# Patient Record
Sex: FEMALE | Race: WHITE | NOT HISPANIC OR LATINO
[De-identification: names, ages, dates, MRNs, and addresses within clinical notes are randomized per-mention and may not be internally consistent; named-entity substitution may affect disease eponyms.]

---

## 2017-05-15 PROBLEM — Z00.00 ENCOUNTER FOR PREVENTIVE HEALTH EXAMINATION: Status: ACTIVE | Noted: 2017-05-15

## 2017-06-28 ENCOUNTER — APPOINTMENT (OUTPATIENT)
Dept: PULMONOLOGY | Facility: CLINIC | Age: 77
End: 2017-06-28

## 2017-06-28 VITALS
DIASTOLIC BLOOD PRESSURE: 60 MMHG | OXYGEN SATURATION: 94 % | BODY MASS INDEX: 47.92 KG/M2 | HEART RATE: 91 BPM | SYSTOLIC BLOOD PRESSURE: 108 MMHG | HEIGHT: 56 IN | WEIGHT: 213 LBS

## 2017-06-28 DIAGNOSIS — Z86.39 PERSONAL HISTORY OF OTHER ENDOCRINE, NUTRITIONAL AND METABOLIC DISEASE: ICD-10-CM

## 2017-06-28 DIAGNOSIS — Z87.891 PERSONAL HISTORY OF NICOTINE DEPENDENCE: ICD-10-CM

## 2017-06-28 DIAGNOSIS — Z86.79 PERSONAL HISTORY OF OTHER DISEASES OF THE CIRCULATORY SYSTEM: ICD-10-CM

## 2017-06-28 DIAGNOSIS — Z87.19 PERSONAL HISTORY OF OTHER DISEASES OF THE DIGESTIVE SYSTEM: ICD-10-CM

## 2017-06-28 DIAGNOSIS — J31.0 CHRONIC RHINITIS: ICD-10-CM

## 2017-06-28 RX ORDER — PREDNISONE 10 MG/1
10 TABLET ORAL
Qty: 40 | Refills: 0 | Status: DISCONTINUED | COMMUNITY
Start: 2017-05-01

## 2017-06-28 RX ORDER — METOPROLOL SUCCINATE 200 MG/1
200 TABLET, EXTENDED RELEASE ORAL
Qty: 90 | Refills: 0 | Status: ACTIVE | COMMUNITY
Start: 2016-09-19

## 2017-06-28 RX ORDER — ALBUTEROL SULFATE 90 UG/1
108 (90 BASE) AEROSOL, METERED RESPIRATORY (INHALATION)
Qty: 18 | Refills: 0 | Status: ACTIVE | COMMUNITY
Start: 2017-04-25

## 2017-06-28 RX ORDER — ATORVASTATIN CALCIUM 40 MG/1
40 TABLET, FILM COATED ORAL
Qty: 90 | Refills: 0 | Status: ACTIVE | COMMUNITY
Start: 2016-09-19

## 2017-06-28 RX ORDER — FENOFIBRATE 160 MG/1
160 TABLET ORAL
Qty: 90 | Refills: 0 | Status: ACTIVE | COMMUNITY
Start: 2016-09-19

## 2017-06-28 RX ORDER — AZITHROMYCIN 250 MG/1
250 TABLET, FILM COATED ORAL
Qty: 6 | Refills: 0 | Status: DISCONTINUED | COMMUNITY
Start: 2017-04-25

## 2017-06-28 RX ORDER — CILOSTAZOL 100 MG/1
100 TABLET ORAL
Qty: 180 | Refills: 0 | Status: DISCONTINUED | COMMUNITY
Start: 2016-08-30

## 2017-06-28 RX ORDER — AMOXICILLIN 875 MG/1
875 TABLET, FILM COATED ORAL
Qty: 20 | Refills: 0 | Status: DISCONTINUED | COMMUNITY
Start: 2017-03-30

## 2017-06-28 RX ORDER — GLIPIZIDE 10 MG/1
10 TABLET, EXTENDED RELEASE ORAL
Qty: 180 | Refills: 0 | Status: ACTIVE | COMMUNITY
Start: 2016-12-27

## 2017-06-28 RX ORDER — RAMIPRIL 10 MG/1
10 CAPSULE ORAL
Qty: 90 | Refills: 0 | Status: ACTIVE | COMMUNITY
Start: 2016-09-19

## 2017-06-28 RX ORDER — LEVOTHYROXINE SODIUM 0.07 MG/1
75 TABLET ORAL
Qty: 90 | Refills: 0 | Status: ACTIVE | COMMUNITY
Start: 2016-12-27

## 2017-06-28 RX ORDER — ALBUTEROL SULFATE 0.63 MG/3ML
0.63 SOLUTION RESPIRATORY (INHALATION)
Qty: 360 | Refills: 0 | Status: ACTIVE | COMMUNITY
Start: 2017-05-01

## 2017-06-28 RX ORDER — AMOXICILLIN AND CLAVULANATE POTASSIUM 875; 125 MG/1; MG/1
875-125 TABLET, COATED ORAL
Qty: 20 | Refills: 0 | Status: DISCONTINUED | COMMUNITY
Start: 2017-05-01

## 2017-06-28 RX ORDER — FLUTICASONE PROPIONATE 50 UG/1
50 SPRAY, METERED NASAL
Qty: 16 | Refills: 0 | Status: ACTIVE | COMMUNITY
Start: 2017-03-30

## 2017-07-05 ENCOUNTER — FORM ENCOUNTER (OUTPATIENT)
Age: 77
End: 2017-07-05

## 2017-07-06 ENCOUNTER — OUTPATIENT (OUTPATIENT)
Dept: OUTPATIENT SERVICES | Facility: HOSPITAL | Age: 77
LOS: 1 days | End: 2017-07-06
Payer: MEDICARE

## 2017-07-06 DIAGNOSIS — I73.9 PERIPHERAL VASCULAR DISEASE, UNSPECIFIED: ICD-10-CM

## 2017-07-06 DIAGNOSIS — R09.02 HYPOXEMIA: ICD-10-CM

## 2017-07-06 PROCEDURE — 71010: CPT | Mod: 26

## 2017-07-06 PROCEDURE — A9567: CPT

## 2017-07-06 PROCEDURE — 71045 X-RAY EXAM CHEST 1 VIEW: CPT

## 2017-07-06 PROCEDURE — 78582 LUNG VENTILAT&PERFUS IMAGING: CPT | Mod: 26

## 2017-07-06 PROCEDURE — 93970 EXTREMITY STUDY: CPT

## 2017-07-06 PROCEDURE — 93970 EXTREMITY STUDY: CPT | Mod: 26

## 2017-07-06 PROCEDURE — 78582 LUNG VENTILAT&PERFUS IMAGING: CPT

## 2017-07-06 PROCEDURE — A9540: CPT

## 2017-07-11 ENCOUNTER — APPOINTMENT (OUTPATIENT)
Dept: PULMONOLOGY | Facility: CLINIC | Age: 77
End: 2017-07-11

## 2017-07-11 VITALS
HEIGHT: 56 IN | SYSTOLIC BLOOD PRESSURE: 124 MMHG | HEART RATE: 84 BPM | BODY MASS INDEX: 47.24 KG/M2 | WEIGHT: 210 LBS | DIASTOLIC BLOOD PRESSURE: 76 MMHG | OXYGEN SATURATION: 90 %

## 2017-07-11 VITALS — OXYGEN SATURATION: 94 %

## 2017-07-11 RX ORDER — METFORMIN ER 500 MG 500 MG/1
500 TABLET ORAL
Qty: 180 | Refills: 0 | Status: DISCONTINUED | COMMUNITY
Start: 2016-12-27 | End: 2017-07-11

## 2017-07-11 RX ORDER — DULAGLUTIDE 0.75 MG/.5ML
0.75 INJECTION, SOLUTION SUBCUTANEOUS
Qty: 2 | Refills: 0 | Status: ACTIVE | COMMUNITY
Start: 2017-06-28

## 2017-10-18 ENCOUNTER — APPOINTMENT (OUTPATIENT)
Dept: PULMONOLOGY | Facility: CLINIC | Age: 77
End: 2017-10-18
Payer: MEDICARE

## 2017-10-18 VITALS — DIASTOLIC BLOOD PRESSURE: 66 MMHG | SYSTOLIC BLOOD PRESSURE: 118 MMHG

## 2017-10-18 VITALS — OXYGEN SATURATION: 95 %

## 2017-10-18 PROCEDURE — 99214 OFFICE O/P EST MOD 30 MIN: CPT

## 2017-10-18 RX ORDER — BENZONATATE 100 MG/1
100 CAPSULE ORAL
Qty: 30 | Refills: 0 | Status: DISCONTINUED | COMMUNITY
Start: 2017-04-28 | End: 2017-10-18

## 2018-04-06 ENCOUNTER — MESSAGE (OUTPATIENT)
Age: 78
End: 2018-04-06

## 2018-04-10 ENCOUNTER — APPOINTMENT (OUTPATIENT)
Dept: THORACIC SURGERY | Facility: CLINIC | Age: 78
End: 2018-04-10
Payer: MEDICARE

## 2018-04-10 VITALS
BODY MASS INDEX: 43.21 KG/M2 | DIASTOLIC BLOOD PRESSURE: 74 MMHG | HEART RATE: 88 BPM | OXYGEN SATURATION: 93 % | TEMPERATURE: 98.2 F | HEIGHT: 58.27 IN | SYSTOLIC BLOOD PRESSURE: 171 MMHG | WEIGHT: 208.65 LBS

## 2018-04-10 PROCEDURE — 99204 OFFICE O/P NEW MOD 45 MIN: CPT

## 2018-04-18 ENCOUNTER — APPOINTMENT (OUTPATIENT)
Dept: PULMONOLOGY | Facility: CLINIC | Age: 78
End: 2018-04-18
Payer: MEDICARE

## 2018-04-18 VITALS — OXYGEN SATURATION: 92 % | HEART RATE: 89 BPM

## 2018-04-18 VITALS — BODY MASS INDEX: 42.66 KG/M2 | SYSTOLIC BLOOD PRESSURE: 150 MMHG | WEIGHT: 206 LBS | DIASTOLIC BLOOD PRESSURE: 80 MMHG

## 2018-04-18 VITALS — OXYGEN SATURATION: 94 %

## 2018-04-18 PROCEDURE — 99214 OFFICE O/P EST MOD 30 MIN: CPT

## 2018-07-20 ENCOUNTER — APPOINTMENT (OUTPATIENT)
Dept: PULMONOLOGY | Facility: CLINIC | Age: 78
End: 2018-07-20

## 2018-08-06 ENCOUNTER — APPOINTMENT (OUTPATIENT)
Dept: PULMONOLOGY | Facility: CLINIC | Age: 78
End: 2018-08-06
Payer: MEDICARE

## 2018-08-06 VITALS — OXYGEN SATURATION: 96 %

## 2018-08-06 VITALS
HEIGHT: 58 IN | SYSTOLIC BLOOD PRESSURE: 142 MMHG | DIASTOLIC BLOOD PRESSURE: 70 MMHG | HEART RATE: 88 BPM | WEIGHT: 192 LBS | OXYGEN SATURATION: 90 % | BODY MASS INDEX: 40.3 KG/M2

## 2018-08-06 PROCEDURE — 99214 OFFICE O/P EST MOD 30 MIN: CPT

## 2018-08-06 RX ORDER — FLUTICASONE FUROATE AND VILANTEROL TRIFENATATE 100; 25 UG/1; UG/1
100-25 POWDER RESPIRATORY (INHALATION)
Qty: 60 | Refills: 0 | Status: DISCONTINUED | COMMUNITY
Start: 2017-05-31 | End: 2018-08-06

## 2018-11-16 ENCOUNTER — APPOINTMENT (OUTPATIENT)
Dept: PULMONOLOGY | Facility: CLINIC | Age: 78
End: 2018-11-16
Payer: MEDICARE

## 2018-11-16 VITALS
OXYGEN SATURATION: 92 % | BODY MASS INDEX: 42.01 KG/M2 | HEART RATE: 53 BPM | DIASTOLIC BLOOD PRESSURE: 60 MMHG | WEIGHT: 201 LBS | SYSTOLIC BLOOD PRESSURE: 100 MMHG

## 2018-11-16 VITALS — OXYGEN SATURATION: 96 %

## 2018-11-16 PROCEDURE — 99214 OFFICE O/P EST MOD 30 MIN: CPT

## 2018-11-16 RX ORDER — INSULIN DEGLUDEC INJECTION 100 U/ML
100 INJECTION, SOLUTION SUBCUTANEOUS
Refills: 0 | Status: ACTIVE | COMMUNITY
Start: 2018-11-16

## 2019-02-06 ENCOUNTER — APPOINTMENT (OUTPATIENT)
Dept: PULMONOLOGY | Facility: CLINIC | Age: 79
End: 2019-02-06
Payer: MEDICARE

## 2019-02-06 VITALS — DIASTOLIC BLOOD PRESSURE: 72 MMHG | OXYGEN SATURATION: 92 % | SYSTOLIC BLOOD PRESSURE: 128 MMHG | HEART RATE: 50 BPM

## 2019-02-06 VITALS — OXYGEN SATURATION: 94 %

## 2019-02-06 DIAGNOSIS — Z03.89 ENCOUNTER FOR OBSERVATION FOR OTHER SUSPECTED DISEASES AND CONDITIONS RULED OUT: ICD-10-CM

## 2019-02-06 PROCEDURE — 99215 OFFICE O/P EST HI 40 MIN: CPT | Mod: 25

## 2019-02-06 PROCEDURE — 94010 BREATHING CAPACITY TEST: CPT

## 2019-02-06 NOTE — DISCUSSION/SUMMARY
[FreeTextEntry1] : CT scan 1/19 reviewed, resolved GG infiltrates, LLL nodule more dense, no size change\par no new pulmonary complaints\par spirometry without air flow obstruction, minimal change FVC from 2017\par borderline sp02 ,clinical  OHS/JOVON- refuses sleep study, \par previous v/q scan low probability, duplex negative, intermittently low sp02 presumed secondary to body habitus\par following with Cardiology and vascular for CAD, vascular stenosis, advised ER for chestc sspain this am or Cardiology today, she refuses, will go if recurs\par weight loss, vaccinations this fall \par 4 months , will contact sooner with repeat CT scan

## 2019-02-06 NOTE — PHYSICAL EXAM
[General Appearance - Well Developed] : well developed [Normal Appearance] : normal appearance [General Appearance - Well Nourished] : well nourished [No Deformities] : no deformities [Normal Conjunctiva] : the conjunctiva exhibited no abnormalities [Normal Oropharynx] : normal oropharynx [II] : II [Neck Appearance] : the appearance of the neck was normal [Heart Rate And Rhythm] : heart rate and rhythm were normal [Heart Sounds] : normal S1 and S2 [Murmurs] : no murmurs present [Edema] : no peripheral edema present [Respiration, Rhythm And Depth] : normal respiratory rhythm and effort [Exaggerated Use Of Accessory Muscles For Inspiration] : no accessory muscle use [Auscultation Breath Sounds / Voice Sounds] : lungs were clear to auscultation bilaterally [FreeTextEntry1] : no chest wall abn [Nail Clubbing] : no clubbing of the fingernails [Cyanosis, Localized] : no localized cyanosis [Petechial Hemorrhages (___cm)] : no petechial hemorrhages [No Focal Deficits] : no focal deficits [Oriented To Time, Place, And Person] : oriented to person, place, and time [Impaired Insight] : insight and judgment were intact [Affect] : the affect was normal [Memory Recent] : recent memory was not impaired [] : no rash

## 2019-02-06 NOTE — PROCEDURE
[FreeTextEntry1] : spirometry: mild mod air flow obstruction, minimal change from 2017\par CT 1/19 : resolved GG infiltrates, LLL nodule more dense, no change in size\par

## 2019-02-06 NOTE — CONSULT LETTER
[Dear  ___] : Dear  [unfilled], [Consult Letter:] : I had the pleasure of evaluating your patient, [unfilled]. [Please see my note below.] : Please see my note below. [Consult Closing:] : Thank you very much for allowing me to participate in the care of this patient.  If you have any questions, please do not hesitate to contact me. [Sincerely,] : Sincerely, [FreeTextEntry3] : Bobo Campo DO [facility]\par Pulmonary Critical Care\par Director Pulmonary Division\par Medical Director Respiratory Therapy\par [facility]\par \par  [DrMorenita  ___] : Dr. WINKLER [[facility], [facility]] : Bob[facility] Camp[facility] DO, [facility] [Director, Respiratory Care] : Director, Respiratory Care [[facility]] : [facility]

## 2019-02-06 NOTE — REASON FOR VISIT
[Follow-Up] : a follow-up visit [Abnormal CT Scan] : abnormal CT Scan [Cough] : cough [FreeTextEntry2] : lung nodule, adenopathy

## 2019-02-06 NOTE — HISTORY OF PRESENT ILLNESS
[FreeTextEntry1] : had chests pain this am with dyspnea, now better\par no fever, chills, or discolored sputum\par former 40 pack yr smoker quit 18 yrs ago\par saw Dr Valenzuela, will follow CT scan\par followed by Dr Paz, has CAD/stents, recent cath , now new stents had collaterals per pt

## 2019-03-13 ENCOUNTER — APPOINTMENT (OUTPATIENT)
Dept: DERMATOLOGY | Facility: CLINIC | Age: 79
End: 2019-03-13

## 2019-05-24 ENCOUNTER — APPOINTMENT (OUTPATIENT)
Dept: PULMONOLOGY | Facility: CLINIC | Age: 79
End: 2019-05-24
Payer: MEDICARE

## 2019-05-24 VITALS — OXYGEN SATURATION: 94 %

## 2019-05-24 VITALS — DIASTOLIC BLOOD PRESSURE: 70 MMHG | OXYGEN SATURATION: 92 % | SYSTOLIC BLOOD PRESSURE: 130 MMHG | HEART RATE: 75 BPM

## 2019-05-24 VITALS — WEIGHT: 205 LBS | BODY MASS INDEX: 42.85 KG/M2

## 2019-05-24 DIAGNOSIS — R59.0 LOCALIZED ENLARGED LYMPH NODES: ICD-10-CM

## 2019-05-24 PROCEDURE — 99215 OFFICE O/P EST HI 40 MIN: CPT

## 2019-05-24 RX ORDER — PREGABALIN 300 MG/1
CAPSULE ORAL
Refills: 0 | Status: ACTIVE | COMMUNITY

## 2019-05-24 NOTE — PHYSICAL EXAM
[Normal Appearance] : normal appearance [General Appearance - Well Nourished] : well nourished [General Appearance - Well Developed] : well developed [No Deformities] : no deformities [Normal Conjunctiva] : the conjunctiva exhibited no abnormalities [Normal Oropharynx] : normal oropharynx [Neck Appearance] : the appearance of the neck was normal [II] : II [Heart Rate And Rhythm] : heart rate and rhythm were normal [Edema] : no peripheral edema present [Heart Sounds] : normal S1 and S2 [Murmurs] : no murmurs present [Auscultation Breath Sounds / Voice Sounds] : lungs were clear to auscultation bilaterally [Respiration, Rhythm And Depth] : normal respiratory rhythm and effort [Exaggerated Use Of Accessory Muscles For Inspiration] : no accessory muscle use [Nail Clubbing] : no clubbing of the fingernails [Petechial Hemorrhages (___cm)] : no petechial hemorrhages [Cyanosis, Localized] : no localized cyanosis [No Focal Deficits] : no focal deficits [Impaired Insight] : insight and judgment were intact [Oriented To Time, Place, And Person] : oriented to person, place, and time [Memory Recent] : recent memory was not impaired [] : no rash [Affect] : the affect was normal [FreeTextEntry1] : L carotid scar

## 2019-05-24 NOTE — HISTORY OF PRESENT ILLNESS
[FreeTextEntry1] : dyspnea at baseline\par no fever, chills, or discolored sputum\par former 40 pack yr smoker quit 18 yrs ago\par saw Dr Valenzuela, will follow CT scan\par followed by Dr Paz, has CAD/stents, \par in wheelchair complaining of thigh pain, radiating from back

## 2019-05-24 NOTE — DISCUSSION/SUMMARY
[FreeTextEntry1] : COPD,anatomic emphysema, Obesity, stable mild ILD, lung nodules\par CT scan 5/19 reviewed, resolved GG infiltrates, LLL nodule stable, stable mild ILD, lingula atelectasis like density\par no new pulmonary complaints, refuse sleep study\par previous spirometry without air flow obstruction, minimal change FVC from 2017\par borderline sp02 ,clinical  OHS/JOVON- refuses sleep study, \par previous v/q scan low probability, duplex negative, intermittently low sp02 presumed secondary to body habitus\par following with Cardiology and vascular for CAD, vascular stenosis, advised ER for \par weight loss,\par 6 months , will contact sooner with repeat CT scan

## 2019-05-24 NOTE — REASON FOR VISIT
[Abnormal CT Scan] : abnormal CT Scan [Follow-Up] : a follow-up visit [Cough] : cough [FreeTextEntry2] : lung nodule, adenopathy

## 2019-05-24 NOTE — CONSULT LETTER
[Dear  ___] : Dear  [unfilled], [Consult Letter:] : I had the pleasure of evaluating your patient, [unfilled]. [Consult Closing:] : Thank you very much for allowing me to participate in the care of this patient.  If you have any questions, please do not hesitate to contact me. [Please see my note below.] : Please see my note below. [Sincerely,] : Sincerely, [DrMorenita  ___] : Dr. WINKLER [[facility] Camp[facility] DO, [facility]] : Bobo Camp[facility] DO, [facility] [[facility]] : [facility] [Director, Respiratory Care] : Director, Respiratory Care [FreeTextEntry3] : [facility] DO [facility]\par Pulmonary Critical Care\par Director Pulmonary Division\par Medical Director Respiratory Therapy\par [facility]\par \par

## 2019-11-21 ENCOUNTER — APPOINTMENT (OUTPATIENT)
Dept: PULMONOLOGY | Facility: CLINIC | Age: 79
End: 2019-11-21
Payer: MEDICARE

## 2019-11-21 VITALS
HEIGHT: 55 IN | DIASTOLIC BLOOD PRESSURE: 70 MMHG | HEART RATE: 82 BPM | OXYGEN SATURATION: 92 % | BODY MASS INDEX: 46.98 KG/M2 | SYSTOLIC BLOOD PRESSURE: 156 MMHG | WEIGHT: 203 LBS

## 2019-11-21 VITALS — OXYGEN SATURATION: 94 %

## 2019-11-21 DIAGNOSIS — R09.02 HYPOXEMIA: ICD-10-CM

## 2019-11-21 PROCEDURE — 99214 OFFICE O/P EST MOD 30 MIN: CPT

## 2019-11-21 NOTE — CONSULT LETTER
[Dear  ___] : Dear  [unfilled], [Consult Letter:] : I had the pleasure of evaluating your patient, [unfilled]. [Please see my note below.] : Please see my note below. [Consult Closing:] : Thank you very much for allowing me to participate in the care of this patient.  If you have any questions, please do not hesitate to contact me. [Sincerely,] : Sincerely, [DrMorenita  ___] : Dr. WINKLER [[facility], [facility]] : [facility], [facility] [Director, Respiratory Care] : Director, Respiratory Care [[facility]] : [facility] [FreeTextEntry3] : [facility]\par Pulmonary Critical Care\par Director Pulmonary Division\par Medical Director Respiratory Therapy\par [facility]\par \par

## 2019-11-21 NOTE — DISCUSSION/SUMMARY
[FreeTextEntry1] : COPD,anatomic emphysema, Obesity, stable mild ILD, lung nodules\par CT scan 11/19 reviewed, small new GG infiltrates LLL nodule stable, stable mild ILD, lingula atelectasis like density, suspect recurrent nocturnal aspiration, HOB elevated, anti reflux precautions discussed\par no new pulmonary complaints, refuse sleep study\par previous spirometry without air flow obstruction, minimal change FVC from 2017\par borderline sp02 ,clinical  OHS/JOVON- , \par previous v/q scan low probability, duplex negative, intermittently low sp02 presumed secondary to body habitus\par following with Cardiology and vascular for CAD, vascular stenosis,\par weight loss,\par vaccinations this fall\par 6 months , will contact sooner with repeat CT scan, she doesn’t want sooner

## 2019-11-21 NOTE — HISTORY OF PRESENT ILLNESS
[FreeTextEntry1] : dyspnea at baseline\par no fever, chills, or discolored sputum\par former 40 pack yr smoker quit 18 yrs ago\par saw Dr Valenzuela, will follow CT scan\par followed by Dr Paz, has CAD/stents, \par chronic back pain and macula issues

## 2019-11-21 NOTE — PHYSICAL EXAM
[General Appearance - Well Developed] : well developed [Normal Appearance] : normal appearance [General Appearance - Well Nourished] : well nourished [No Deformities] : no deformities [Normal Conjunctiva] : the conjunctiva exhibited no abnormalities [Normal Oropharynx] : normal oropharynx [II] : II [Neck Appearance] : the appearance of the neck was normal [Heart Rate And Rhythm] : heart rate and rhythm were normal [Heart Sounds] : normal S1 and S2 [Murmurs] : no murmurs present [Edema] : no peripheral edema present [Respiration, Rhythm And Depth] : normal respiratory rhythm and effort [Exaggerated Use Of Accessory Muscles For Inspiration] : no accessory muscle use [Auscultation Breath Sounds / Voice Sounds] : lungs were clear to auscultation bilaterally [Nail Clubbing] : no clubbing of the fingernails [Cyanosis, Localized] : no localized cyanosis [Petechial Hemorrhages (___cm)] : no petechial hemorrhages [No Focal Deficits] : no focal deficits [Oriented To Time, Place, And Person] : oriented to person, place, and time [Impaired Insight] : insight and judgment were intact [Affect] : the affect was normal [Memory Recent] : recent memory was not impaired [] : no rash [FreeTextEntry1] : no chest wall abn

## 2020-01-01 ENCOUNTER — APPOINTMENT (OUTPATIENT)
Dept: PULMONOLOGY | Facility: CLINIC | Age: 80
End: 2020-01-01
Payer: MEDICARE

## 2020-01-01 VITALS — OXYGEN SATURATION: 95 %

## 2020-01-01 VITALS
SYSTOLIC BLOOD PRESSURE: 130 MMHG | RESPIRATION RATE: 16 BRPM | OXYGEN SATURATION: 92 % | DIASTOLIC BLOOD PRESSURE: 80 MMHG | HEART RATE: 80 BPM

## 2020-01-01 VITALS — BODY MASS INDEX: 47.65 KG/M2 | WEIGHT: 205 LBS

## 2020-01-01 DIAGNOSIS — E66.01 MORBID (SEVERE) OBESITY DUE TO EXCESS CALORIES: ICD-10-CM

## 2020-01-01 DIAGNOSIS — R05 COUGH: ICD-10-CM

## 2020-01-01 DIAGNOSIS — R91.8 OTHER NONSPECIFIC ABNORMAL FINDING OF LUNG FIELD: ICD-10-CM

## 2020-01-01 DIAGNOSIS — R91.1 SOLITARY PULMONARY NODULE: ICD-10-CM

## 2020-01-01 PROCEDURE — 99215 OFFICE O/P EST HI 40 MIN: CPT

## 2020-09-09 PROBLEM — E66.01 MORBID OBESITY DUE TO EXCESS CALORIES: Status: ACTIVE | Noted: 2017-07-11

## 2020-09-09 PROBLEM — R91.1 SOLITARY PULMONARY NODULE: Status: ACTIVE | Noted: 2019-02-06

## 2020-09-09 PROBLEM — R05 COUGH: Status: ACTIVE | Noted: 2017-06-28

## 2020-09-09 PROBLEM — R91.8 GROUND GLASS OPACITY PRESENT ON IMAGING OF LUNG: Status: ACTIVE | Noted: 2018-11-16

## 2020-09-09 NOTE — REASON FOR VISIT
[Follow-Up] : a follow-up visit [Cough] : cough [Abnormal CT Scan] : abnormal CT Scan [FreeTextEntry2] : lung nodule, adenopathy

## 2020-09-09 NOTE — PROCEDURE
[FreeTextEntry1] : CT scan  9/30: mild increased reticular markings at bases, no sig focal GG nodules\par

## 2020-09-09 NOTE — CONSULT LETTER
[Dear  ___] : Dear  [unfilled], [Consult Letter:] : I had the pleasure of evaluating your patient, [unfilled]. [Sincerely,] : Sincerely, [Please see my note below.] : Please see my note below. [Consult Closing:] : Thank you very much for allowing me to participate in the care of this patient.  If you have any questions, please do not hesitate to contact me. [DrMorenita  ___] : Dr. WINKLER [[facility] DO, [facility]] : [facility] Camp[facility] DO, [facility] [Director, Respiratory Care] : Director, Respiratory Care [[facility]] : [facility] [FreeTextEntry3] : Bobo Campo DO [facility]\par Pulmonary Critical Care\par Director Pulmonary Division\par Medical Director Respiratory Therapy\par [facility]\par \par

## 2020-09-09 NOTE — HISTORY OF PRESENT ILLNESS
[TextBox_4] : dyspnea at baseline no fever, chills, or discolored sputum former 40 pack yr smoker quit 18 yrs ago saw Dr Valenzuela, will follow CT scan followed by Dr Paz, has CAD/stents,  chronic back pain and macula issues

## 2020-09-09 NOTE — PHYSICAL EXAM
[General Appearance - Well Developed] : well developed [No Deformities] : no deformities [Normal Appearance] : normal appearance [General Appearance - Well Nourished] : well nourished [Normal Conjunctiva] : the conjunctiva exhibited no abnormalities [Normal Oropharynx] : normal oropharynx [Neck Appearance] : the appearance of the neck was normal [II] : II [Heart Rate And Rhythm] : heart rate and rhythm were normal [Heart Sounds] : normal S1 and S2 [Edema] : no peripheral edema present [Murmurs] : no murmurs present [Exaggerated Use Of Accessory Muscles For Inspiration] : no accessory muscle use [Auscultation Breath Sounds / Voice Sounds] : lungs were clear to auscultation bilaterally [Respiration, Rhythm And Depth] : normal respiratory rhythm and effort [Cyanosis, Localized] : no localized cyanosis [Nail Clubbing] : no clubbing of the fingernails [Petechial Hemorrhages (___cm)] : no petechial hemorrhages [No Focal Deficits] : no focal deficits [Oriented To Time, Place, And Person] : oriented to person, place, and time [] : no rash [Impaired Insight] : insight and judgment were intact [Affect] : the affect was normal [Memory Recent] : recent memory was not impaired [FreeTextEntry1] : no chest wall abn

## 2020-09-09 NOTE — DISCUSSION/SUMMARY
[FreeTextEntry1] : COPD,anatomic emphysema, Obesity, stable mild reticular markings bases, favor recurrent nocturnal aspiration\par HOB elevated, anti reflux precautions discussed\par no new pulmonary complaints, refuse sleep study\par previous spirometry without air flow obstruction, minimal change FVC from 2017\par lung exam without bronchospasm, normal sp02 ,clinical  OHS/JOVON- , \par previous v/q scan low probability, duplex negative, intermittently low sp02 presumed secondary to body habitus\par following with Cardiology and vascular for CAD, vascular stenosis,\par weight loss,\par vaccinations this fall\par 6 months , will contact sooner

## 2021-01-01 ENCOUNTER — TRANSCRIPTION ENCOUNTER (OUTPATIENT)
Age: 81
End: 2021-01-01

## 2021-01-01 ENCOUNTER — RESULT REVIEW (OUTPATIENT)
Age: 81
End: 2021-01-01

## 2021-01-01 ENCOUNTER — INPATIENT (INPATIENT)
Facility: HOSPITAL | Age: 81
LOS: 16 days | DRG: 239 | End: 2021-02-24
Attending: SURGERY | Admitting: INTERNAL MEDICINE
Payer: MEDICARE

## 2021-01-01 ENCOUNTER — INPATIENT (INPATIENT)
Facility: HOSPITAL | Age: 81
LOS: 3 days | Discharge: HOME | DRG: 603 | End: 2021-01-24
Attending: INTERNAL MEDICINE | Admitting: HOSPITALIST
Payer: MEDICARE

## 2021-01-01 VITALS
HEIGHT: 60 IN | RESPIRATION RATE: 18 BRPM | TEMPERATURE: 98 F | OXYGEN SATURATION: 96 % | HEART RATE: 100 BPM | SYSTOLIC BLOOD PRESSURE: 188 MMHG | DIASTOLIC BLOOD PRESSURE: 83 MMHG | WEIGHT: 195.11 LBS

## 2021-01-01 VITALS
OXYGEN SATURATION: 100 % | DIASTOLIC BLOOD PRESSURE: 79 MMHG | SYSTOLIC BLOOD PRESSURE: 92 MMHG | HEART RATE: 73 BPM | RESPIRATION RATE: 16 BRPM

## 2021-01-01 VITALS
SYSTOLIC BLOOD PRESSURE: 143 MMHG | DIASTOLIC BLOOD PRESSURE: 62 MMHG | RESPIRATION RATE: 19 BRPM | TEMPERATURE: 98 F | OXYGEN SATURATION: 92 % | HEART RATE: 84 BPM

## 2021-01-01 VITALS
TEMPERATURE: 98 F | OXYGEN SATURATION: 95 % | DIASTOLIC BLOOD PRESSURE: 92 MMHG | SYSTOLIC BLOOD PRESSURE: 175 MMHG | HEART RATE: 88 BPM | WEIGHT: 199.96 LBS | HEIGHT: 60 IN | RESPIRATION RATE: 16 BRPM

## 2021-01-01 DIAGNOSIS — E11.621 TYPE 2 DIABETES MELLITUS WITH FOOT ULCER: ICD-10-CM

## 2021-01-01 DIAGNOSIS — L03.90 CELLULITIS, UNSPECIFIED: ICD-10-CM

## 2021-01-01 LAB
-  AMPICILLIN/SULBACTAM: SIGNIFICANT CHANGE UP
-  CEFAZOLIN: SIGNIFICANT CHANGE UP
-  CLINDAMYCIN: SIGNIFICANT CHANGE UP
-  ERYTHROMYCIN: SIGNIFICANT CHANGE UP
-  GENTAMICIN: SIGNIFICANT CHANGE UP
-  OXACILLIN: SIGNIFICANT CHANGE UP
-  PENICILLIN: SIGNIFICANT CHANGE UP
-  RIFAMPIN: SIGNIFICANT CHANGE UP
-  TETRACYCLINE: SIGNIFICANT CHANGE UP
-  TRIMETHOPRIM/SULFAMETHOXAZOLE: SIGNIFICANT CHANGE UP
-  VANCOMYCIN: SIGNIFICANT CHANGE UP
A1C WITH ESTIMATED AVERAGE GLUCOSE RESULT: 7.7 % — HIGH (ref 4–5.6)
ALBUMIN SERPL ELPH-MCNC: 2.2 G/DL — LOW (ref 3.3–5.2)
ALBUMIN SERPL ELPH-MCNC: 2.2 G/DL — LOW (ref 3.3–5.2)
ALBUMIN SERPL ELPH-MCNC: 2.3 G/DL — LOW (ref 3.3–5.2)
ALBUMIN SERPL ELPH-MCNC: 2.4 G/DL — LOW (ref 3.3–5.2)
ALBUMIN SERPL ELPH-MCNC: 2.5 G/DL — LOW (ref 3.3–5.2)
ALBUMIN SERPL ELPH-MCNC: 4 G/DL — SIGNIFICANT CHANGE UP (ref 3.3–5.2)
ALBUMIN SERPL ELPH-MCNC: 4 G/DL — SIGNIFICANT CHANGE UP (ref 3.3–5.2)
ALBUMIN SERPL ELPH-MCNC: 4.1 G/DL — SIGNIFICANT CHANGE UP (ref 3.3–5.2)
ALP SERPL-CCNC: 52 U/L — SIGNIFICANT CHANGE UP (ref 40–120)
ALP SERPL-CCNC: 53 U/L — SIGNIFICANT CHANGE UP (ref 40–120)
ALP SERPL-CCNC: 56 U/L — SIGNIFICANT CHANGE UP (ref 40–120)
ALP SERPL-CCNC: 61 U/L — SIGNIFICANT CHANGE UP (ref 40–120)
ALP SERPL-CCNC: 61 U/L — SIGNIFICANT CHANGE UP (ref 40–120)
ALP SERPL-CCNC: 62 U/L — SIGNIFICANT CHANGE UP (ref 40–120)
ALP SERPL-CCNC: 62 U/L — SIGNIFICANT CHANGE UP (ref 40–120)
ALP SERPL-CCNC: 63 U/L — SIGNIFICANT CHANGE UP (ref 40–120)
ALT FLD-CCNC: 121 U/L — HIGH
ALT FLD-CCNC: 13 U/L — SIGNIFICANT CHANGE UP
ALT FLD-CCNC: 134 U/L — HIGH
ALT FLD-CCNC: 14 U/L — SIGNIFICANT CHANGE UP
ALT FLD-CCNC: 14 U/L — SIGNIFICANT CHANGE UP
ALT FLD-CCNC: 15 U/L — SIGNIFICANT CHANGE UP
ALT FLD-CCNC: 16 U/L — SIGNIFICANT CHANGE UP
ALT FLD-CCNC: 35 U/L — HIGH
ANION GAP SERPL CALC-SCNC: 10 MMOL/L — SIGNIFICANT CHANGE UP (ref 5–17)
ANION GAP SERPL CALC-SCNC: 11 MMOL/L — SIGNIFICANT CHANGE UP (ref 5–17)
ANION GAP SERPL CALC-SCNC: 11 MMOL/L — SIGNIFICANT CHANGE UP (ref 5–17)
ANION GAP SERPL CALC-SCNC: 12 MMOL/L — SIGNIFICANT CHANGE UP (ref 5–17)
ANION GAP SERPL CALC-SCNC: 12 MMOL/L — SIGNIFICANT CHANGE UP (ref 5–17)
ANION GAP SERPL CALC-SCNC: 13 MMOL/L — SIGNIFICANT CHANGE UP (ref 5–17)
ANION GAP SERPL CALC-SCNC: 14 MMOL/L — SIGNIFICANT CHANGE UP (ref 5–17)
ANION GAP SERPL CALC-SCNC: 15 MMOL/L — SIGNIFICANT CHANGE UP (ref 5–17)
ANION GAP SERPL CALC-SCNC: 15 MMOL/L — SIGNIFICANT CHANGE UP (ref 5–17)
ANION GAP SERPL CALC-SCNC: 16 MMOL/L — SIGNIFICANT CHANGE UP (ref 5–17)
ANION GAP SERPL CALC-SCNC: 16 MMOL/L — SIGNIFICANT CHANGE UP (ref 5–17)
ANION GAP SERPL CALC-SCNC: 17 MMOL/L — SIGNIFICANT CHANGE UP (ref 5–17)
ANION GAP SERPL CALC-SCNC: 8 MMOL/L — SIGNIFICANT CHANGE UP (ref 5–17)
ANION GAP SERPL CALC-SCNC: 9 MMOL/L — SIGNIFICANT CHANGE UP (ref 5–17)
ANISOCYTOSIS BLD QL: SLIGHT — SIGNIFICANT CHANGE UP
ANISOCYTOSIS BLD QL: SLIGHT — SIGNIFICANT CHANGE UP
APPEARANCE UR: ABNORMAL
APTT BLD: 100 SEC — HIGH (ref 27.5–35.5)
APTT BLD: 111.8 SEC — HIGH (ref 27.5–35.5)
APTT BLD: 187.6 SEC — CRITICAL HIGH (ref 27.5–35.5)
APTT BLD: 24.9 SEC — LOW (ref 27.5–35.5)
APTT BLD: 25.7 SEC — LOW (ref 27.5–35.5)
APTT BLD: 26.5 SEC — LOW (ref 27.5–35.5)
APTT BLD: 27 SEC — LOW (ref 27.5–35.5)
APTT BLD: 27.9 SEC — SIGNIFICANT CHANGE UP (ref 27.5–35.5)
APTT BLD: 34.1 SEC — SIGNIFICANT CHANGE UP (ref 27.5–35.5)
APTT BLD: 36.4 SEC — HIGH (ref 27.5–35.5)
APTT BLD: 53.5 SEC — HIGH (ref 27.5–35.5)
APTT BLD: 55.1 SEC — HIGH (ref 27.5–35.5)
APTT BLD: 55.4 SEC — HIGH (ref 27.5–35.5)
APTT BLD: 56 SEC — HIGH (ref 27.5–35.5)
APTT BLD: 59.8 SEC — HIGH (ref 27.5–35.5)
APTT BLD: 61.4 SEC — HIGH (ref 27.5–35.5)
APTT BLD: 62.8 SEC — HIGH (ref 27.5–35.5)
APTT BLD: 67.9 SEC — HIGH (ref 27.5–35.5)
APTT BLD: 68.2 SEC — HIGH (ref 27.5–35.5)
APTT BLD: 72 SEC — HIGH (ref 27.5–35.5)
APTT BLD: 73.2 SEC — HIGH (ref 27.5–35.5)
APTT BLD: 73.5 SEC — HIGH (ref 27.5–35.5)
APTT BLD: 75.6 SEC — HIGH (ref 27.5–35.5)
APTT BLD: 81 SEC — HIGH (ref 27.5–35.5)
APTT BLD: 85.6 SEC — HIGH (ref 27.5–35.5)
APTT BLD: 91.6 SEC — HIGH (ref 27.5–35.5)
APTT BLD: 95.9 SEC — HIGH (ref 27.5–35.5)
AST SERPL-CCNC: 104 U/L — HIGH
AST SERPL-CCNC: 105 U/L — HIGH
AST SERPL-CCNC: 14 U/L — SIGNIFICANT CHANGE UP
AST SERPL-CCNC: 143 U/L — HIGH
AST SERPL-CCNC: 17 U/L — SIGNIFICANT CHANGE UP
AST SERPL-CCNC: 30 U/L — SIGNIFICANT CHANGE UP
AST SERPL-CCNC: 324 U/L — HIGH
AST SERPL-CCNC: 339 U/L — HIGH
BACTERIA # UR AUTO: ABNORMAL
BASE EXCESS BLDV CALC-SCNC: 5.4 MMOL/L — HIGH (ref -3–3)
BASE EXCESS BLDV CALC-SCNC: 5.5 MMOL/L — HIGH (ref -3–3)
BASE EXCESS BLDV CALC-SCNC: 8.6 MMOL/L — HIGH (ref -3–3)
BASO STIPL BLD QL SMEAR: PRESENT — SIGNIFICANT CHANGE UP
BASOPHILS # BLD AUTO: 0 K/UL — SIGNIFICANT CHANGE UP (ref 0–0.2)
BASOPHILS # BLD AUTO: 0.02 K/UL — SIGNIFICANT CHANGE UP (ref 0–0.2)
BASOPHILS # BLD AUTO: 0.03 K/UL — SIGNIFICANT CHANGE UP (ref 0–0.2)
BASOPHILS # BLD AUTO: 0.04 K/UL — SIGNIFICANT CHANGE UP (ref 0–0.2)
BASOPHILS # BLD AUTO: 0.05 K/UL — SIGNIFICANT CHANGE UP (ref 0–0.2)
BASOPHILS NFR BLD AUTO: 0 % — SIGNIFICANT CHANGE UP (ref 0–2)
BASOPHILS NFR BLD AUTO: 0.1 % — SIGNIFICANT CHANGE UP (ref 0–2)
BASOPHILS NFR BLD AUTO: 0.2 % — SIGNIFICANT CHANGE UP (ref 0–2)
BASOPHILS NFR BLD AUTO: 0.3 % — SIGNIFICANT CHANGE UP (ref 0–2)
BASOPHILS NFR BLD AUTO: 0.4 % — SIGNIFICANT CHANGE UP (ref 0–2)
BASOPHILS NFR BLD AUTO: 0.5 % — SIGNIFICANT CHANGE UP (ref 0–2)
BILIRUB SERPL-MCNC: 0.3 MG/DL — LOW (ref 0.4–2)
BILIRUB SERPL-MCNC: 0.4 MG/DL — SIGNIFICANT CHANGE UP (ref 0.4–2)
BILIRUB SERPL-MCNC: 0.4 MG/DL — SIGNIFICANT CHANGE UP (ref 0.4–2)
BILIRUB SERPL-MCNC: 0.5 MG/DL — SIGNIFICANT CHANGE UP (ref 0.4–2)
BILIRUB SERPL-MCNC: 0.6 MG/DL — SIGNIFICANT CHANGE UP (ref 0.4–2)
BILIRUB SERPL-MCNC: <0.2 MG/DL — LOW (ref 0.4–2)
BILIRUB UR-MCNC: NEGATIVE — SIGNIFICANT CHANGE UP
BLD GP AB SCN SERPL QL: SIGNIFICANT CHANGE UP
BUN SERPL-MCNC: 15 MG/DL — SIGNIFICANT CHANGE UP (ref 8–20)
BUN SERPL-MCNC: 16 MG/DL — SIGNIFICANT CHANGE UP (ref 8–20)
BUN SERPL-MCNC: 16 MG/DL — SIGNIFICANT CHANGE UP (ref 8–20)
BUN SERPL-MCNC: 17 MG/DL — SIGNIFICANT CHANGE UP (ref 8–20)
BUN SERPL-MCNC: 18 MG/DL — SIGNIFICANT CHANGE UP (ref 8–20)
BUN SERPL-MCNC: 19 MG/DL — SIGNIFICANT CHANGE UP (ref 8–20)
BUN SERPL-MCNC: 20 MG/DL — SIGNIFICANT CHANGE UP (ref 8–20)
BUN SERPL-MCNC: 21 MG/DL — HIGH (ref 8–20)
BUN SERPL-MCNC: 23 MG/DL — HIGH (ref 8–20)
BUN SERPL-MCNC: 24 MG/DL — HIGH (ref 8–20)
BUN SERPL-MCNC: 24 MG/DL — HIGH (ref 8–20)
BUN SERPL-MCNC: 25 MG/DL — HIGH (ref 8–20)
BUN SERPL-MCNC: 25 MG/DL — HIGH (ref 8–20)
BUN SERPL-MCNC: 31 MG/DL — HIGH (ref 8–20)
BUN SERPL-MCNC: 33 MG/DL — HIGH (ref 8–20)
BUN SERPL-MCNC: 33 MG/DL — HIGH (ref 8–20)
BUN SERPL-MCNC: 34 MG/DL — HIGH (ref 8–20)
BUN SERPL-MCNC: 37 MG/DL — HIGH (ref 8–20)
BUN SERPL-MCNC: 38 MG/DL — HIGH (ref 8–20)
CA-I SERPL-SCNC: 1.04 MMOL/L — LOW (ref 1.12–1.3)
CA-I SERPL-SCNC: 1.07 MMOL/L — SIGNIFICANT CHANGE UP (ref 1.15–1.33)
CA-I SERPL-SCNC: 1.12 MMOL/L — SIGNIFICANT CHANGE UP (ref 1.12–1.3)
CALCIUM SERPL-MCNC: 10 MG/DL — SIGNIFICANT CHANGE UP (ref 8.6–10.2)
CALCIUM SERPL-MCNC: 10.1 MG/DL — SIGNIFICANT CHANGE UP (ref 8.6–10.2)
CALCIUM SERPL-MCNC: 10.1 MG/DL — SIGNIFICANT CHANGE UP (ref 8.6–10.2)
CALCIUM SERPL-MCNC: 10.3 MG/DL — HIGH (ref 8.6–10.2)
CALCIUM SERPL-MCNC: 10.6 MG/DL — HIGH (ref 8.6–10.2)
CALCIUM SERPL-MCNC: 11.2 MG/DL — HIGH (ref 8.6–10.2)
CALCIUM SERPL-MCNC: 4.4 MG/DL — CRITICAL LOW (ref 8.6–10.2)
CALCIUM SERPL-MCNC: 7.2 MG/DL — LOW (ref 8.6–10.2)
CALCIUM SERPL-MCNC: 7.4 MG/DL — LOW (ref 8.6–10.2)
CALCIUM SERPL-MCNC: 7.4 MG/DL — LOW (ref 8.6–10.2)
CALCIUM SERPL-MCNC: 7.5 MG/DL — LOW (ref 8.6–10.2)
CALCIUM SERPL-MCNC: 7.6 MG/DL — LOW (ref 8.6–10.2)
CALCIUM SERPL-MCNC: 7.8 MG/DL — LOW (ref 8.6–10.2)
CALCIUM SERPL-MCNC: 7.9 MG/DL — LOW (ref 8.6–10.2)
CALCIUM SERPL-MCNC: 8 MG/DL — LOW (ref 8.6–10.2)
CALCIUM SERPL-MCNC: 8.1 MG/DL — LOW (ref 8.6–10.2)
CALCIUM SERPL-MCNC: 8.1 MG/DL — LOW (ref 8.6–10.2)
CALCIUM SERPL-MCNC: 8.2 MG/DL — LOW (ref 8.6–10.2)
CALCIUM SERPL-MCNC: 8.2 MG/DL — LOW (ref 8.6–10.2)
CALCIUM SERPL-MCNC: 8.4 MG/DL — LOW (ref 8.6–10.2)
CALCIUM SERPL-MCNC: 9.5 MG/DL — SIGNIFICANT CHANGE UP (ref 8.6–10.2)
CALCIUM SERPL-MCNC: 9.5 MG/DL — SIGNIFICANT CHANGE UP (ref 8.6–10.2)
CALCIUM SERPL-MCNC: 9.6 MG/DL — SIGNIFICANT CHANGE UP (ref 8.6–10.2)
CALCIUM SERPL-MCNC: 9.7 MG/DL — SIGNIFICANT CHANGE UP (ref 8.6–10.2)
CALCIUM SERPL-MCNC: 9.9 MG/DL — SIGNIFICANT CHANGE UP (ref 8.6–10.2)
CHLORIDE BLDV-SCNC: 100 MMOL/L — SIGNIFICANT CHANGE UP (ref 98–106)
CHLORIDE BLDV-SCNC: 100 MMOL/L — SIGNIFICANT CHANGE UP (ref 98–107)
CHLORIDE BLDV-SCNC: 101 MMOL/L — SIGNIFICANT CHANGE UP (ref 98–106)
CHLORIDE SERPL-SCNC: 100 MMOL/L — SIGNIFICANT CHANGE UP (ref 98–107)
CHLORIDE SERPL-SCNC: 101 MMOL/L — SIGNIFICANT CHANGE UP (ref 98–107)
CHLORIDE SERPL-SCNC: 102 MMOL/L — SIGNIFICANT CHANGE UP (ref 98–107)
CHLORIDE SERPL-SCNC: 103 MMOL/L — SIGNIFICANT CHANGE UP (ref 98–107)
CHLORIDE SERPL-SCNC: 105 MMOL/L — SIGNIFICANT CHANGE UP (ref 98–107)
CHLORIDE SERPL-SCNC: 107 MMOL/L — SIGNIFICANT CHANGE UP (ref 98–107)
CHLORIDE SERPL-SCNC: 107 MMOL/L — SIGNIFICANT CHANGE UP (ref 98–107)
CHLORIDE SERPL-SCNC: 116 MMOL/L — HIGH (ref 98–107)
CHLORIDE SERPL-SCNC: 98 MMOL/L — SIGNIFICANT CHANGE UP (ref 98–107)
CHLORIDE SERPL-SCNC: 98 MMOL/L — SIGNIFICANT CHANGE UP (ref 98–107)
CHLORIDE SERPL-SCNC: 99 MMOL/L — SIGNIFICANT CHANGE UP (ref 98–107)
CK MB CFR SERPL CALC: 12.7 NG/ML — HIGH (ref 0–6.7)
CK MB CFR SERPL CALC: 27.2 NG/ML — HIGH (ref 0–6.7)
CK MB CFR SERPL CALC: 39 NG/ML — HIGH (ref 0–6.7)
CK SERPL-CCNC: 4191 U/L — HIGH (ref 25–170)
CK SERPL-CCNC: 7333 U/L — HIGH (ref 25–170)
CK SERPL-CCNC: 7352 U/L — HIGH (ref 25–170)
CO2 SERPL-SCNC: 15 MMOL/L — LOW (ref 22–29)
CO2 SERPL-SCNC: 17 MMOL/L — LOW (ref 22–29)
CO2 SERPL-SCNC: 20 MMOL/L — LOW (ref 22–29)
CO2 SERPL-SCNC: 21 MMOL/L — LOW (ref 22–29)
CO2 SERPL-SCNC: 22 MMOL/L — SIGNIFICANT CHANGE UP (ref 22–29)
CO2 SERPL-SCNC: 23 MMOL/L — SIGNIFICANT CHANGE UP (ref 22–29)
CO2 SERPL-SCNC: 24 MMOL/L — SIGNIFICANT CHANGE UP (ref 22–29)
CO2 SERPL-SCNC: 25 MMOL/L — SIGNIFICANT CHANGE UP (ref 22–29)
CO2 SERPL-SCNC: 26 MMOL/L — SIGNIFICANT CHANGE UP (ref 22–29)
CO2 SERPL-SCNC: 27 MMOL/L — SIGNIFICANT CHANGE UP (ref 22–29)
CO2 SERPL-SCNC: 28 MMOL/L — SIGNIFICANT CHANGE UP (ref 22–29)
CO2 SERPL-SCNC: 28 MMOL/L — SIGNIFICANT CHANGE UP (ref 22–29)
CO2 SERPL-SCNC: 31 MMOL/L — HIGH (ref 22–29)
COLOR SPEC: YELLOW — SIGNIFICANT CHANGE UP
COMMENT - URINE: SIGNIFICANT CHANGE UP
CREAT SERPL-MCNC: 0.4 MG/DL — LOW (ref 0.5–1.3)
CREAT SERPL-MCNC: 0.6 MG/DL — SIGNIFICANT CHANGE UP (ref 0.5–1.3)
CREAT SERPL-MCNC: 0.62 MG/DL — SIGNIFICANT CHANGE UP (ref 0.5–1.3)
CREAT SERPL-MCNC: 0.64 MG/DL — SIGNIFICANT CHANGE UP (ref 0.5–1.3)
CREAT SERPL-MCNC: 0.69 MG/DL — SIGNIFICANT CHANGE UP (ref 0.5–1.3)
CREAT SERPL-MCNC: 0.71 MG/DL — SIGNIFICANT CHANGE UP (ref 0.5–1.3)
CREAT SERPL-MCNC: 0.71 MG/DL — SIGNIFICANT CHANGE UP (ref 0.5–1.3)
CREAT SERPL-MCNC: 0.72 MG/DL — SIGNIFICANT CHANGE UP (ref 0.5–1.3)
CREAT SERPL-MCNC: 0.75 MG/DL — SIGNIFICANT CHANGE UP (ref 0.5–1.3)
CREAT SERPL-MCNC: 0.77 MG/DL — SIGNIFICANT CHANGE UP (ref 0.5–1.3)
CREAT SERPL-MCNC: 0.79 MG/DL — SIGNIFICANT CHANGE UP (ref 0.5–1.3)
CREAT SERPL-MCNC: 0.8 MG/DL — SIGNIFICANT CHANGE UP (ref 0.5–1.3)
CREAT SERPL-MCNC: 0.82 MG/DL — SIGNIFICANT CHANGE UP (ref 0.5–1.3)
CREAT SERPL-MCNC: 0.83 MG/DL — SIGNIFICANT CHANGE UP (ref 0.5–1.3)
CREAT SERPL-MCNC: 0.84 MG/DL — SIGNIFICANT CHANGE UP (ref 0.5–1.3)
CREAT SERPL-MCNC: 0.87 MG/DL — SIGNIFICANT CHANGE UP (ref 0.5–1.3)
CREAT SERPL-MCNC: 0.87 MG/DL — SIGNIFICANT CHANGE UP (ref 0.5–1.3)
CREAT SERPL-MCNC: 0.9 MG/DL — SIGNIFICANT CHANGE UP (ref 0.5–1.3)
CREAT SERPL-MCNC: 0.9 MG/DL — SIGNIFICANT CHANGE UP (ref 0.5–1.3)
CREAT SERPL-MCNC: 0.91 MG/DL — SIGNIFICANT CHANGE UP (ref 0.5–1.3)
CREAT SERPL-MCNC: 0.94 MG/DL — SIGNIFICANT CHANGE UP (ref 0.5–1.3)
CREAT SERPL-MCNC: 0.95 MG/DL — SIGNIFICANT CHANGE UP (ref 0.5–1.3)
CREAT SERPL-MCNC: 0.96 MG/DL — SIGNIFICANT CHANGE UP (ref 0.5–1.3)
CREAT SERPL-MCNC: 1.02 MG/DL — SIGNIFICANT CHANGE UP (ref 0.5–1.3)
CREAT SERPL-MCNC: 1.12 MG/DL — SIGNIFICANT CHANGE UP (ref 0.5–1.3)
CREAT SERPL-MCNC: 1.14 MG/DL — SIGNIFICANT CHANGE UP (ref 0.5–1.3)
CREAT SERPL-MCNC: 1.29 MG/DL — SIGNIFICANT CHANGE UP (ref 0.5–1.3)
CREAT SERPL-MCNC: 1.3 MG/DL — SIGNIFICANT CHANGE UP (ref 0.5–1.3)
CREAT SERPL-MCNC: 1.34 MG/DL — HIGH (ref 0.5–1.3)
CRP SERPL-MCNC: 0.64 MG/DL — HIGH (ref 0–0.4)
CULTURE RESULTS: SIGNIFICANT CHANGE UP
DACRYOCYTES BLD QL SMEAR: SLIGHT — SIGNIFICANT CHANGE UP
DIFF PNL FLD: ABNORMAL
EOSINOPHIL # BLD AUTO: 0 K/UL — SIGNIFICANT CHANGE UP (ref 0–0.5)
EOSINOPHIL # BLD AUTO: 0.02 K/UL — SIGNIFICANT CHANGE UP (ref 0–0.5)
EOSINOPHIL # BLD AUTO: 0.03 K/UL — SIGNIFICANT CHANGE UP (ref 0–0.5)
EOSINOPHIL # BLD AUTO: 0.08 K/UL — SIGNIFICANT CHANGE UP (ref 0–0.5)
EOSINOPHIL # BLD AUTO: 0.11 K/UL — SIGNIFICANT CHANGE UP (ref 0–0.5)
EOSINOPHIL # BLD AUTO: 0.11 K/UL — SIGNIFICANT CHANGE UP (ref 0–0.5)
EOSINOPHIL # BLD AUTO: 0.12 K/UL — SIGNIFICANT CHANGE UP (ref 0–0.5)
EOSINOPHIL # BLD AUTO: 0.13 K/UL — SIGNIFICANT CHANGE UP (ref 0–0.5)
EOSINOPHIL # BLD AUTO: 0.14 K/UL — SIGNIFICANT CHANGE UP (ref 0–0.5)
EOSINOPHIL # BLD AUTO: 0.16 K/UL — SIGNIFICANT CHANGE UP (ref 0–0.5)
EOSINOPHIL # BLD AUTO: 0.17 K/UL — SIGNIFICANT CHANGE UP (ref 0–0.5)
EOSINOPHIL # BLD AUTO: 0.17 K/UL — SIGNIFICANT CHANGE UP (ref 0–0.5)
EOSINOPHIL # BLD AUTO: 0.19 K/UL — SIGNIFICANT CHANGE UP (ref 0–0.5)
EOSINOPHIL # BLD AUTO: 0.2 K/UL — SIGNIFICANT CHANGE UP (ref 0–0.5)
EOSINOPHIL # BLD AUTO: 0.25 K/UL — SIGNIFICANT CHANGE UP (ref 0–0.5)
EOSINOPHIL NFR BLD AUTO: 0 % — SIGNIFICANT CHANGE UP (ref 0–6)
EOSINOPHIL NFR BLD AUTO: 0.2 % — SIGNIFICANT CHANGE UP (ref 0–6)
EOSINOPHIL NFR BLD AUTO: 0.2 % — SIGNIFICANT CHANGE UP (ref 0–6)
EOSINOPHIL NFR BLD AUTO: 0.5 % — SIGNIFICANT CHANGE UP (ref 0–6)
EOSINOPHIL NFR BLD AUTO: 0.7 % — SIGNIFICANT CHANGE UP (ref 0–6)
EOSINOPHIL NFR BLD AUTO: 0.9 % — SIGNIFICANT CHANGE UP (ref 0–6)
EOSINOPHIL NFR BLD AUTO: 1 % — SIGNIFICANT CHANGE UP (ref 0–6)
EOSINOPHIL NFR BLD AUTO: 1.1 % — SIGNIFICANT CHANGE UP (ref 0–6)
EOSINOPHIL NFR BLD AUTO: 1.1 % — SIGNIFICANT CHANGE UP (ref 0–6)
EOSINOPHIL NFR BLD AUTO: 1.2 % — SIGNIFICANT CHANGE UP (ref 0–6)
EOSINOPHIL NFR BLD AUTO: 1.4 % — SIGNIFICANT CHANGE UP (ref 0–6)
EOSINOPHIL NFR BLD AUTO: 1.4 % — SIGNIFICANT CHANGE UP (ref 0–6)
EOSINOPHIL NFR BLD AUTO: 1.9 % — SIGNIFICANT CHANGE UP (ref 0–6)
EOSINOPHIL NFR BLD AUTO: 2.2 % — SIGNIFICANT CHANGE UP (ref 0–6)
EOSINOPHIL NFR BLD AUTO: 2.6 % — SIGNIFICANT CHANGE UP (ref 0–6)
EPI CELLS # UR: SIGNIFICANT CHANGE UP
ERYTHROCYTE [SEDIMENTATION RATE] IN BLOOD: 30 MM/HR — HIGH (ref 0–20)
ESTIMATED AVERAGE GLUCOSE: 174 MG/DL — HIGH (ref 68–114)
GAS PNL BLDA: SIGNIFICANT CHANGE UP
GAS PNL BLDV: 135 MMOL/L — LOW (ref 136–146)
GAS PNL BLDV: 135 MMOL/L — SIGNIFICANT CHANGE UP (ref 135–145)
GAS PNL BLDV: 140 MMOL/L — SIGNIFICANT CHANGE UP (ref 136–146)
GAS PNL BLDV: SIGNIFICANT CHANGE UP
GIANT PLATELETS BLD QL SMEAR: PRESENT — SIGNIFICANT CHANGE UP
GIANT PLATELETS BLD QL SMEAR: PRESENT — SIGNIFICANT CHANGE UP
GLUCOSE BLDC GLUCOMTR-MCNC: 104 MG/DL — HIGH (ref 70–99)
GLUCOSE BLDC GLUCOMTR-MCNC: 105 MG/DL — HIGH (ref 70–99)
GLUCOSE BLDC GLUCOMTR-MCNC: 112 MG/DL — HIGH (ref 70–99)
GLUCOSE BLDC GLUCOMTR-MCNC: 115 MG/DL — HIGH (ref 70–99)
GLUCOSE BLDC GLUCOMTR-MCNC: 116 MG/DL — HIGH (ref 70–99)
GLUCOSE BLDC GLUCOMTR-MCNC: 118 MG/DL — HIGH (ref 70–99)
GLUCOSE BLDC GLUCOMTR-MCNC: 118 MG/DL — HIGH (ref 70–99)
GLUCOSE BLDC GLUCOMTR-MCNC: 119 MG/DL — HIGH (ref 70–99)
GLUCOSE BLDC GLUCOMTR-MCNC: 120 MG/DL — HIGH (ref 70–99)
GLUCOSE BLDC GLUCOMTR-MCNC: 124 MG/DL — HIGH (ref 70–99)
GLUCOSE BLDC GLUCOMTR-MCNC: 127 MG/DL — HIGH (ref 70–99)
GLUCOSE BLDC GLUCOMTR-MCNC: 130 MG/DL — HIGH (ref 70–99)
GLUCOSE BLDC GLUCOMTR-MCNC: 133 MG/DL — HIGH (ref 70–99)
GLUCOSE BLDC GLUCOMTR-MCNC: 135 MG/DL — HIGH (ref 70–99)
GLUCOSE BLDC GLUCOMTR-MCNC: 138 MG/DL — HIGH (ref 70–99)
GLUCOSE BLDC GLUCOMTR-MCNC: 139 MG/DL — HIGH (ref 70–99)
GLUCOSE BLDC GLUCOMTR-MCNC: 139 MG/DL — HIGH (ref 70–99)
GLUCOSE BLDC GLUCOMTR-MCNC: 140 MG/DL — HIGH (ref 70–99)
GLUCOSE BLDC GLUCOMTR-MCNC: 142 MG/DL — HIGH (ref 70–99)
GLUCOSE BLDC GLUCOMTR-MCNC: 142 MG/DL — HIGH (ref 70–99)
GLUCOSE BLDC GLUCOMTR-MCNC: 143 MG/DL — HIGH (ref 70–99)
GLUCOSE BLDC GLUCOMTR-MCNC: 144 MG/DL — HIGH (ref 70–99)
GLUCOSE BLDC GLUCOMTR-MCNC: 144 MG/DL — HIGH (ref 70–99)
GLUCOSE BLDC GLUCOMTR-MCNC: 148 MG/DL — HIGH (ref 70–99)
GLUCOSE BLDC GLUCOMTR-MCNC: 150 MG/DL — HIGH (ref 70–99)
GLUCOSE BLDC GLUCOMTR-MCNC: 151 MG/DL — HIGH (ref 70–99)
GLUCOSE BLDC GLUCOMTR-MCNC: 152 MG/DL — HIGH (ref 70–99)
GLUCOSE BLDC GLUCOMTR-MCNC: 153 MG/DL — HIGH (ref 70–99)
GLUCOSE BLDC GLUCOMTR-MCNC: 153 MG/DL — HIGH (ref 70–99)
GLUCOSE BLDC GLUCOMTR-MCNC: 157 MG/DL — HIGH (ref 70–99)
GLUCOSE BLDC GLUCOMTR-MCNC: 160 MG/DL — HIGH (ref 70–99)
GLUCOSE BLDC GLUCOMTR-MCNC: 160 MG/DL — HIGH (ref 70–99)
GLUCOSE BLDC GLUCOMTR-MCNC: 162 MG/DL — HIGH (ref 70–99)
GLUCOSE BLDC GLUCOMTR-MCNC: 163 MG/DL — HIGH (ref 70–99)
GLUCOSE BLDC GLUCOMTR-MCNC: 165 MG/DL — HIGH (ref 70–99)
GLUCOSE BLDC GLUCOMTR-MCNC: 165 MG/DL — HIGH (ref 70–99)
GLUCOSE BLDC GLUCOMTR-MCNC: 166 MG/DL — HIGH (ref 70–99)
GLUCOSE BLDC GLUCOMTR-MCNC: 167 MG/DL — HIGH (ref 70–99)
GLUCOSE BLDC GLUCOMTR-MCNC: 170 MG/DL — HIGH (ref 70–99)
GLUCOSE BLDC GLUCOMTR-MCNC: 172 MG/DL — HIGH (ref 70–99)
GLUCOSE BLDC GLUCOMTR-MCNC: 172 MG/DL — HIGH (ref 70–99)
GLUCOSE BLDC GLUCOMTR-MCNC: 173 MG/DL — HIGH (ref 70–99)
GLUCOSE BLDC GLUCOMTR-MCNC: 174 MG/DL — HIGH (ref 70–99)
GLUCOSE BLDC GLUCOMTR-MCNC: 177 MG/DL — HIGH (ref 70–99)
GLUCOSE BLDC GLUCOMTR-MCNC: 180 MG/DL — HIGH (ref 70–99)
GLUCOSE BLDC GLUCOMTR-MCNC: 180 MG/DL — HIGH (ref 70–99)
GLUCOSE BLDC GLUCOMTR-MCNC: 182 MG/DL — HIGH (ref 70–99)
GLUCOSE BLDC GLUCOMTR-MCNC: 183 MG/DL — HIGH (ref 70–99)
GLUCOSE BLDC GLUCOMTR-MCNC: 184 MG/DL — HIGH (ref 70–99)
GLUCOSE BLDC GLUCOMTR-MCNC: 185 MG/DL — HIGH (ref 70–99)
GLUCOSE BLDC GLUCOMTR-MCNC: 185 MG/DL — HIGH (ref 70–99)
GLUCOSE BLDC GLUCOMTR-MCNC: 186 MG/DL — HIGH (ref 70–99)
GLUCOSE BLDC GLUCOMTR-MCNC: 187 MG/DL — HIGH (ref 70–99)
GLUCOSE BLDC GLUCOMTR-MCNC: 188 MG/DL — HIGH (ref 70–99)
GLUCOSE BLDC GLUCOMTR-MCNC: 189 MG/DL — HIGH (ref 70–99)
GLUCOSE BLDC GLUCOMTR-MCNC: 190 MG/DL — HIGH (ref 70–99)
GLUCOSE BLDC GLUCOMTR-MCNC: 191 MG/DL — HIGH (ref 70–99)
GLUCOSE BLDC GLUCOMTR-MCNC: 192 MG/DL — HIGH (ref 70–99)
GLUCOSE BLDC GLUCOMTR-MCNC: 192 MG/DL — HIGH (ref 70–99)
GLUCOSE BLDC GLUCOMTR-MCNC: 193 MG/DL — HIGH (ref 70–99)
GLUCOSE BLDC GLUCOMTR-MCNC: 195 MG/DL — HIGH (ref 70–99)
GLUCOSE BLDC GLUCOMTR-MCNC: 196 MG/DL — HIGH (ref 70–99)
GLUCOSE BLDC GLUCOMTR-MCNC: 197 MG/DL — HIGH (ref 70–99)
GLUCOSE BLDC GLUCOMTR-MCNC: 199 MG/DL — HIGH (ref 70–99)
GLUCOSE BLDC GLUCOMTR-MCNC: 201 MG/DL — HIGH (ref 70–99)
GLUCOSE BLDC GLUCOMTR-MCNC: 201 MG/DL — HIGH (ref 70–99)
GLUCOSE BLDC GLUCOMTR-MCNC: 204 MG/DL — HIGH (ref 70–99)
GLUCOSE BLDC GLUCOMTR-MCNC: 209 MG/DL — HIGH (ref 70–99)
GLUCOSE BLDC GLUCOMTR-MCNC: 210 MG/DL — HIGH (ref 70–99)
GLUCOSE BLDC GLUCOMTR-MCNC: 214 MG/DL — HIGH (ref 70–99)
GLUCOSE BLDC GLUCOMTR-MCNC: 214 MG/DL — HIGH (ref 70–99)
GLUCOSE BLDC GLUCOMTR-MCNC: 216 MG/DL — HIGH (ref 70–99)
GLUCOSE BLDC GLUCOMTR-MCNC: 225 MG/DL — HIGH (ref 70–99)
GLUCOSE BLDC GLUCOMTR-MCNC: 227 MG/DL — HIGH (ref 70–99)
GLUCOSE BLDC GLUCOMTR-MCNC: 232 MG/DL — HIGH (ref 70–99)
GLUCOSE BLDC GLUCOMTR-MCNC: 233 MG/DL — HIGH (ref 70–99)
GLUCOSE BLDC GLUCOMTR-MCNC: 239 MG/DL — HIGH (ref 70–99)
GLUCOSE BLDC GLUCOMTR-MCNC: 241 MG/DL — HIGH (ref 70–99)
GLUCOSE BLDC GLUCOMTR-MCNC: 244 MG/DL — HIGH (ref 70–99)
GLUCOSE BLDC GLUCOMTR-MCNC: 248 MG/DL — HIGH (ref 70–99)
GLUCOSE BLDC GLUCOMTR-MCNC: 253 MG/DL — HIGH (ref 70–99)
GLUCOSE BLDC GLUCOMTR-MCNC: 287 MG/DL — HIGH (ref 70–99)
GLUCOSE BLDC GLUCOMTR-MCNC: 292 MG/DL — HIGH (ref 70–99)
GLUCOSE BLDC GLUCOMTR-MCNC: 312 MG/DL — HIGH (ref 70–99)
GLUCOSE BLDC GLUCOMTR-MCNC: 314 MG/DL — HIGH (ref 70–99)
GLUCOSE BLDC GLUCOMTR-MCNC: 82 MG/DL — SIGNIFICANT CHANGE UP (ref 70–99)
GLUCOSE BLDC GLUCOMTR-MCNC: 82 MG/DL — SIGNIFICANT CHANGE UP (ref 70–99)
GLUCOSE BLDC GLUCOMTR-MCNC: 83 MG/DL — SIGNIFICANT CHANGE UP (ref 70–99)
GLUCOSE BLDC GLUCOMTR-MCNC: 89 MG/DL — SIGNIFICANT CHANGE UP (ref 70–99)
GLUCOSE BLDC GLUCOMTR-MCNC: 90 MG/DL — SIGNIFICANT CHANGE UP (ref 70–99)
GLUCOSE BLDC GLUCOMTR-MCNC: 92 MG/DL — SIGNIFICANT CHANGE UP (ref 70–99)
GLUCOSE BLDC GLUCOMTR-MCNC: 92 MG/DL — SIGNIFICANT CHANGE UP (ref 70–99)
GLUCOSE BLDC GLUCOMTR-MCNC: 94 MG/DL — SIGNIFICANT CHANGE UP (ref 70–99)
GLUCOSE BLDC GLUCOMTR-MCNC: 94 MG/DL — SIGNIFICANT CHANGE UP (ref 70–99)
GLUCOSE BLDV-MCNC: 113 MG/DL — HIGH (ref 70–99)
GLUCOSE BLDV-MCNC: 128 MG/DL — HIGH (ref 70–99)
GLUCOSE BLDV-MCNC: 80 MG/DL — SIGNIFICANT CHANGE UP (ref 70–99)
GLUCOSE SERPL-MCNC: 109 MG/DL — HIGH (ref 70–99)
GLUCOSE SERPL-MCNC: 116 MG/DL — HIGH (ref 70–99)
GLUCOSE SERPL-MCNC: 123 MG/DL — HIGH (ref 70–99)
GLUCOSE SERPL-MCNC: 132 MG/DL — HIGH (ref 70–99)
GLUCOSE SERPL-MCNC: 133 MG/DL — HIGH (ref 70–99)
GLUCOSE SERPL-MCNC: 136 MG/DL — HIGH (ref 70–99)
GLUCOSE SERPL-MCNC: 143 MG/DL — HIGH (ref 70–99)
GLUCOSE SERPL-MCNC: 151 MG/DL — HIGH (ref 70–99)
GLUCOSE SERPL-MCNC: 152 MG/DL — HIGH (ref 70–99)
GLUCOSE SERPL-MCNC: 158 MG/DL — HIGH (ref 70–99)
GLUCOSE SERPL-MCNC: 161 MG/DL — HIGH (ref 70–99)
GLUCOSE SERPL-MCNC: 163 MG/DL — HIGH (ref 70–99)
GLUCOSE SERPL-MCNC: 165 MG/DL — HIGH (ref 70–99)
GLUCOSE SERPL-MCNC: 177 MG/DL — HIGH (ref 70–99)
GLUCOSE SERPL-MCNC: 181 MG/DL — HIGH (ref 70–99)
GLUCOSE SERPL-MCNC: 182 MG/DL — HIGH (ref 70–99)
GLUCOSE SERPL-MCNC: 184 MG/DL — HIGH (ref 70–99)
GLUCOSE SERPL-MCNC: 188 MG/DL — HIGH (ref 70–99)
GLUCOSE SERPL-MCNC: 188 MG/DL — HIGH (ref 70–99)
GLUCOSE SERPL-MCNC: 192 MG/DL — HIGH (ref 70–99)
GLUCOSE SERPL-MCNC: 192 MG/DL — HIGH (ref 70–99)
GLUCOSE SERPL-MCNC: 196 MG/DL — HIGH (ref 70–99)
GLUCOSE SERPL-MCNC: 213 MG/DL — HIGH (ref 70–99)
GLUCOSE SERPL-MCNC: 270 MG/DL — HIGH (ref 70–99)
GLUCOSE SERPL-MCNC: 322 MG/DL — HIGH (ref 70–99)
GLUCOSE SERPL-MCNC: 362 MG/DL — HIGH (ref 70–99)
GLUCOSE SERPL-MCNC: 78 MG/DL — SIGNIFICANT CHANGE UP (ref 70–99)
GLUCOSE SERPL-MCNC: 80 MG/DL — SIGNIFICANT CHANGE UP (ref 70–99)
GLUCOSE SERPL-MCNC: 81 MG/DL — SIGNIFICANT CHANGE UP (ref 70–99)
GLUCOSE UR QL: NEGATIVE MG/DL — SIGNIFICANT CHANGE UP
GRAM STN FLD: SIGNIFICANT CHANGE UP
HCO3 BLDV-SCNC: 29 MMOL/L — HIGH (ref 20–26)
HCO3 BLDV-SCNC: 32 MMOL/L — HIGH (ref 20–26)
HCT VFR BLD CALC: 21.2 % — LOW (ref 34.5–45)
HCT VFR BLD CALC: 22.4 % — LOW (ref 34.5–45)
HCT VFR BLD CALC: 24.4 % — LOW (ref 34.5–45)
HCT VFR BLD CALC: 24.7 % — LOW (ref 34.5–45)
HCT VFR BLD CALC: 25.9 % — LOW (ref 34.5–45)
HCT VFR BLD CALC: 26.1 % — LOW (ref 34.5–45)
HCT VFR BLD CALC: 26.4 % — LOW (ref 34.5–45)
HCT VFR BLD CALC: 26.4 % — LOW (ref 34.5–45)
HCT VFR BLD CALC: 26.8 % — LOW (ref 34.5–45)
HCT VFR BLD CALC: 27.2 % — LOW (ref 34.5–45)
HCT VFR BLD CALC: 27.5 % — LOW (ref 34.5–45)
HCT VFR BLD CALC: 27.5 % — LOW (ref 34.5–45)
HCT VFR BLD CALC: 28.1 % — LOW (ref 34.5–45)
HCT VFR BLD CALC: 28.5 % — LOW (ref 34.5–45)
HCT VFR BLD CALC: 29.4 % — LOW (ref 34.5–45)
HCT VFR BLD CALC: 29.7 % — LOW (ref 34.5–45)
HCT VFR BLD CALC: 32.5 % — LOW (ref 34.5–45)
HCT VFR BLD CALC: 32.5 % — LOW (ref 34.5–45)
HCT VFR BLD CALC: 36.7 % — SIGNIFICANT CHANGE UP (ref 34.5–45)
HCT VFR BLD CALC: 36.9 % — SIGNIFICANT CHANGE UP (ref 34.5–45)
HCT VFR BLD CALC: 37 % — SIGNIFICANT CHANGE UP (ref 34.5–45)
HCT VFR BLD CALC: 37.2 % — SIGNIFICANT CHANGE UP (ref 34.5–45)
HCT VFR BLD CALC: 38.4 % — SIGNIFICANT CHANGE UP (ref 34.5–45)
HCT VFR BLD CALC: 39.2 % — SIGNIFICANT CHANGE UP (ref 34.5–45)
HCT VFR BLD CALC: 39.8 % — SIGNIFICANT CHANGE UP (ref 34.5–45)
HCT VFR BLD CALC: 40.2 % — SIGNIFICANT CHANGE UP (ref 34.5–45)
HCT VFR BLD CALC: 40.4 % — SIGNIFICANT CHANGE UP (ref 34.5–45)
HCT VFR BLD CALC: 42.2 % — SIGNIFICANT CHANGE UP (ref 34.5–45)
HCT VFR BLDA CALC: 14 — LOW (ref 39–50)
HCT VFR BLDA CALC: 23 — LOW (ref 39–50)
HCT VFR BLDA CALC: 3 — CRITICAL LOW (ref 39–50)
HGB BLD CALC-MCNC: 7.5 G/DL — LOW (ref 11.5–15.5)
HGB BLD CALC-MCNC: SIGNIFICANT CHANGE UP G/DL (ref 11.5–15.5)
HGB BLD-MCNC: 10.3 G/DL — LOW (ref 11.5–15.5)
HGB BLD-MCNC: 10.6 G/DL — LOW (ref 11.5–15.5)
HGB BLD-MCNC: 11.6 G/DL — SIGNIFICANT CHANGE UP (ref 11.5–15.5)
HGB BLD-MCNC: 11.7 G/DL — SIGNIFICANT CHANGE UP (ref 11.5–15.5)
HGB BLD-MCNC: 11.8 G/DL — SIGNIFICANT CHANGE UP (ref 11.5–15.5)
HGB BLD-MCNC: 11.8 G/DL — SIGNIFICANT CHANGE UP (ref 11.5–15.5)
HGB BLD-MCNC: 12.2 G/DL — SIGNIFICANT CHANGE UP (ref 11.5–15.5)
HGB BLD-MCNC: 12.5 G/DL — SIGNIFICANT CHANGE UP (ref 11.5–15.5)
HGB BLD-MCNC: 12.8 G/DL — SIGNIFICANT CHANGE UP (ref 11.5–15.5)
HGB BLD-MCNC: 13 G/DL — SIGNIFICANT CHANGE UP (ref 11.5–15.5)
HGB BLD-MCNC: 13.2 G/DL — SIGNIFICANT CHANGE UP (ref 11.5–15.5)
HGB BLD-MCNC: 13.2 G/DL — SIGNIFICANT CHANGE UP (ref 11.5–15.5)
HGB BLD-MCNC: 7.1 G/DL — LOW (ref 11.5–15.5)
HGB BLD-MCNC: 7.3 G/DL — LOW (ref 11.5–15.5)
HGB BLD-MCNC: 7.8 G/DL — LOW (ref 11.5–15.5)
HGB BLD-MCNC: 8.1 G/DL — LOW (ref 11.5–15.5)
HGB BLD-MCNC: 8.2 G/DL — LOW (ref 11.5–15.5)
HGB BLD-MCNC: 8.3 G/DL — LOW (ref 11.5–15.5)
HGB BLD-MCNC: 8.5 G/DL — LOW (ref 11.5–15.5)
HGB BLD-MCNC: 8.6 G/DL — LOW (ref 11.5–15.5)
HGB BLD-MCNC: 8.7 G/DL — LOW (ref 11.5–15.5)
HGB BLD-MCNC: 8.9 G/DL — LOW (ref 11.5–15.5)
HGB BLD-MCNC: 8.9 G/DL — LOW (ref 11.5–15.5)
HGB BLD-MCNC: 9 G/DL — LOW (ref 11.5–15.5)
HGB BLD-MCNC: 9.1 G/DL — LOW (ref 11.5–15.5)
HGB BLD-MCNC: 9.1 G/DL — LOW (ref 11.5–15.5)
HGB BLD-MCNC: 9.6 G/DL — LOW (ref 11.5–15.5)
HGB BLD-MCNC: 9.7 G/DL — LOW (ref 11.5–15.5)
HOROWITZ INDEX BLDV+IHG-RTO: 0.4 — SIGNIFICANT CHANGE UP
HYPOCHROMIA BLD QL: SLIGHT — SIGNIFICANT CHANGE UP
IMM GRANULOCYTES NFR BLD AUTO: 0.4 % — SIGNIFICANT CHANGE UP (ref 0–1.5)
IMM GRANULOCYTES NFR BLD AUTO: 0.5 % — SIGNIFICANT CHANGE UP (ref 0–1.5)
IMM GRANULOCYTES NFR BLD AUTO: 0.6 % — SIGNIFICANT CHANGE UP (ref 0–1.5)
IMM GRANULOCYTES NFR BLD AUTO: 1 % — SIGNIFICANT CHANGE UP (ref 0–1.5)
IMM GRANULOCYTES NFR BLD AUTO: 1.1 % — SIGNIFICANT CHANGE UP (ref 0–1.5)
IMM GRANULOCYTES NFR BLD AUTO: 1.1 % — SIGNIFICANT CHANGE UP (ref 0–1.5)
IMM GRANULOCYTES NFR BLD AUTO: 1.3 % — SIGNIFICANT CHANGE UP (ref 0–1.5)
IMM GRANULOCYTES NFR BLD AUTO: 2.1 % — HIGH (ref 0–1.5)
IMM GRANULOCYTES NFR BLD AUTO: 3.2 % — HIGH (ref 0–1.5)
IMM GRANULOCYTES NFR BLD AUTO: 4.2 % — HIGH (ref 0–1.5)
IMM GRANULOCYTES NFR BLD AUTO: 4.4 % — HIGH (ref 0–1.5)
IMM GRANULOCYTES NFR BLD AUTO: 4.6 % — HIGH (ref 0–1.5)
IMM GRANULOCYTES NFR BLD AUTO: 4.7 % — HIGH (ref 0–1.5)
IMM GRANULOCYTES NFR BLD AUTO: 5.5 % — HIGH (ref 0–1.5)
INR BLD: 1.08 RATIO — SIGNIFICANT CHANGE UP (ref 0.88–1.16)
INR BLD: 1.15 RATIO — SIGNIFICANT CHANGE UP (ref 0.88–1.16)
INR BLD: 1.27 RATIO — HIGH (ref 0.88–1.16)
INR BLD: 1.29 RATIO — HIGH (ref 0.88–1.16)
INR BLD: 1.31 RATIO — HIGH (ref 0.88–1.16)
INR BLD: 1.43 RATIO — HIGH (ref 0.88–1.16)
INR BLD: 1.48 RATIO — HIGH (ref 0.88–1.16)
KETONES UR-MCNC: NEGATIVE — SIGNIFICANT CHANGE UP
LACTATE BLDV-MCNC: 1.1 MMOL/L — SIGNIFICANT CHANGE UP (ref 0.5–2)
LACTATE BLDV-MCNC: 1.4 MMOL/L — SIGNIFICANT CHANGE UP (ref 0.5–2)
LACTATE BLDV-MCNC: 1.8 MMOL/L — SIGNIFICANT CHANGE UP (ref 0.5–2)
LACTATE BLDV-MCNC: 2 MMOL/L — SIGNIFICANT CHANGE UP (ref 0.5–2)
LACTATE BLDV-MCNC: 2.3 MMOL/L — HIGH (ref 0.5–2)
LACTATE SERPL-SCNC: 0.9 MMOL/L — SIGNIFICANT CHANGE UP (ref 0.5–2)
LACTATE SERPL-SCNC: 3.4 MMOL/L — HIGH (ref 0.5–2)
LEUKOCYTE ESTERASE UR-ACNC: ABNORMAL
LYMPHOCYTES # BLD AUTO: 0.44 K/UL — LOW (ref 1–3.3)
LYMPHOCYTES # BLD AUTO: 0.6 K/UL — LOW (ref 1–3.3)
LYMPHOCYTES # BLD AUTO: 1.08 K/UL — SIGNIFICANT CHANGE UP (ref 1–3.3)
LYMPHOCYTES # BLD AUTO: 1.15 K/UL — SIGNIFICANT CHANGE UP (ref 1–3.3)
LYMPHOCYTES # BLD AUTO: 1.28 K/UL — SIGNIFICANT CHANGE UP (ref 1–3.3)
LYMPHOCYTES # BLD AUTO: 1.32 K/UL — SIGNIFICANT CHANGE UP (ref 1–3.3)
LYMPHOCYTES # BLD AUTO: 1.33 K/UL — SIGNIFICANT CHANGE UP (ref 1–3.3)
LYMPHOCYTES # BLD AUTO: 1.46 K/UL — SIGNIFICANT CHANGE UP (ref 1–3.3)
LYMPHOCYTES # BLD AUTO: 1.47 K/UL — SIGNIFICANT CHANGE UP (ref 1–3.3)
LYMPHOCYTES # BLD AUTO: 1.47 K/UL — SIGNIFICANT CHANGE UP (ref 1–3.3)
LYMPHOCYTES # BLD AUTO: 1.53 K/UL — SIGNIFICANT CHANGE UP (ref 1–3.3)
LYMPHOCYTES # BLD AUTO: 1.55 K/UL — SIGNIFICANT CHANGE UP (ref 1–3.3)
LYMPHOCYTES # BLD AUTO: 1.91 K/UL — SIGNIFICANT CHANGE UP (ref 1–3.3)
LYMPHOCYTES # BLD AUTO: 1.92 K/UL — SIGNIFICANT CHANGE UP (ref 1–3.3)
LYMPHOCYTES # BLD AUTO: 10.2 % — LOW (ref 13–44)
LYMPHOCYTES # BLD AUTO: 10.6 % — LOW (ref 13–44)
LYMPHOCYTES # BLD AUTO: 10.7 % — LOW (ref 13–44)
LYMPHOCYTES # BLD AUTO: 11 % — LOW (ref 13–44)
LYMPHOCYTES # BLD AUTO: 12.5 % — LOW (ref 13–44)
LYMPHOCYTES # BLD AUTO: 12.9 % — LOW (ref 13–44)
LYMPHOCYTES # BLD AUTO: 14.1 % — SIGNIFICANT CHANGE UP (ref 13–44)
LYMPHOCYTES # BLD AUTO: 15.8 % — SIGNIFICANT CHANGE UP (ref 13–44)
LYMPHOCYTES # BLD AUTO: 16.8 % — SIGNIFICANT CHANGE UP (ref 13–44)
LYMPHOCYTES # BLD AUTO: 19.8 % — SIGNIFICANT CHANGE UP (ref 13–44)
LYMPHOCYTES # BLD AUTO: 2.13 K/UL — SIGNIFICANT CHANGE UP (ref 1–3.3)
LYMPHOCYTES # BLD AUTO: 2.16 K/UL — SIGNIFICANT CHANGE UP (ref 1–3.3)
LYMPHOCYTES # BLD AUTO: 2.67 K/UL — SIGNIFICANT CHANGE UP (ref 1–3.3)
LYMPHOCYTES # BLD AUTO: 21 % — SIGNIFICANT CHANGE UP (ref 13–44)
LYMPHOCYTES # BLD AUTO: 3.5 % — LOW (ref 13–44)
LYMPHOCYTES # BLD AUTO: 5.2 % — LOW (ref 13–44)
LYMPHOCYTES # BLD AUTO: 6.2 % — LOW (ref 13–44)
LYMPHOCYTES # BLD AUTO: 8.8 % — LOW (ref 13–44)
LYMPHOCYTES # BLD AUTO: 9.2 % — LOW (ref 13–44)
LYMPHOCYTES # BLD AUTO: 9.3 % — LOW (ref 13–44)
MACROCYTES BLD QL: SLIGHT — SIGNIFICANT CHANGE UP
MACROCYTES BLD QL: SLIGHT — SIGNIFICANT CHANGE UP
MAGNESIUM SERPL-MCNC: 1.3 MG/DL — LOW (ref 1.6–2.6)
MAGNESIUM SERPL-MCNC: 1.4 MG/DL — LOW (ref 1.6–2.6)
MAGNESIUM SERPL-MCNC: 1.6 MG/DL — SIGNIFICANT CHANGE UP (ref 1.6–2.6)
MAGNESIUM SERPL-MCNC: 1.7 MG/DL — SIGNIFICANT CHANGE UP (ref 1.6–2.6)
MAGNESIUM SERPL-MCNC: 1.8 MG/DL — SIGNIFICANT CHANGE UP (ref 1.6–2.6)
MAGNESIUM SERPL-MCNC: 2 MG/DL — SIGNIFICANT CHANGE UP (ref 1.6–2.6)
MAGNESIUM SERPL-MCNC: 2.1 MG/DL — SIGNIFICANT CHANGE UP (ref 1.6–2.6)
MAGNESIUM SERPL-MCNC: 2.2 MG/DL — SIGNIFICANT CHANGE UP (ref 1.6–2.6)
MAGNESIUM SERPL-MCNC: 2.2 MG/DL — SIGNIFICANT CHANGE UP (ref 1.6–2.6)
MAGNESIUM SERPL-MCNC: 2.3 MG/DL — SIGNIFICANT CHANGE UP (ref 1.6–2.6)
MAGNESIUM SERPL-MCNC: 2.4 MG/DL — SIGNIFICANT CHANGE UP (ref 1.6–2.6)
MANUAL SMEAR VERIFICATION: SIGNIFICANT CHANGE UP
MCHC RBC-ENTMCNC: 29.4 PG — SIGNIFICANT CHANGE UP (ref 27–34)
MCHC RBC-ENTMCNC: 29.4 PG — SIGNIFICANT CHANGE UP (ref 27–34)
MCHC RBC-ENTMCNC: 29.7 PG — SIGNIFICANT CHANGE UP (ref 27–34)
MCHC RBC-ENTMCNC: 29.7 PG — SIGNIFICANT CHANGE UP (ref 27–34)
MCHC RBC-ENTMCNC: 29.8 PG — SIGNIFICANT CHANGE UP (ref 27–34)
MCHC RBC-ENTMCNC: 29.9 PG — SIGNIFICANT CHANGE UP (ref 27–34)
MCHC RBC-ENTMCNC: 30 PG — SIGNIFICANT CHANGE UP (ref 27–34)
MCHC RBC-ENTMCNC: 30.1 PG — SIGNIFICANT CHANGE UP (ref 27–34)
MCHC RBC-ENTMCNC: 30.2 PG — SIGNIFICANT CHANGE UP (ref 27–34)
MCHC RBC-ENTMCNC: 30.4 PG — SIGNIFICANT CHANGE UP (ref 27–34)
MCHC RBC-ENTMCNC: 30.5 PG — SIGNIFICANT CHANGE UP (ref 27–34)
MCHC RBC-ENTMCNC: 30.6 PG — SIGNIFICANT CHANGE UP (ref 27–34)
MCHC RBC-ENTMCNC: 30.8 PG — SIGNIFICANT CHANGE UP (ref 27–34)
MCHC RBC-ENTMCNC: 30.8 PG — SIGNIFICANT CHANGE UP (ref 27–34)
MCHC RBC-ENTMCNC: 31.1 GM/DL — LOW (ref 32–36)
MCHC RBC-ENTMCNC: 31.1 PG — SIGNIFICANT CHANGE UP (ref 27–34)
MCHC RBC-ENTMCNC: 31.2 GM/DL — LOW (ref 32–36)
MCHC RBC-ENTMCNC: 31.3 GM/DL — LOW (ref 32–36)
MCHC RBC-ENTMCNC: 31.7 GM/DL — LOW (ref 32–36)
MCHC RBC-ENTMCNC: 31.8 GM/DL — LOW (ref 32–36)
MCHC RBC-ENTMCNC: 31.8 GM/DL — LOW (ref 32–36)
MCHC RBC-ENTMCNC: 31.9 GM/DL — LOW (ref 32–36)
MCHC RBC-ENTMCNC: 32 GM/DL — SIGNIFICANT CHANGE UP (ref 32–36)
MCHC RBC-ENTMCNC: 32.2 GM/DL — SIGNIFICANT CHANGE UP (ref 32–36)
MCHC RBC-ENTMCNC: 32.3 GM/DL — SIGNIFICANT CHANGE UP (ref 32–36)
MCHC RBC-ENTMCNC: 32.4 GM/DL — SIGNIFICANT CHANGE UP (ref 32–36)
MCHC RBC-ENTMCNC: 32.4 GM/DL — SIGNIFICANT CHANGE UP (ref 32–36)
MCHC RBC-ENTMCNC: 32.5 GM/DL — SIGNIFICANT CHANGE UP (ref 32–36)
MCHC RBC-ENTMCNC: 32.6 GM/DL — SIGNIFICANT CHANGE UP (ref 32–36)
MCHC RBC-ENTMCNC: 32.6 GM/DL — SIGNIFICANT CHANGE UP (ref 32–36)
MCHC RBC-ENTMCNC: 32.7 GM/DL — SIGNIFICANT CHANGE UP (ref 32–36)
MCHC RBC-ENTMCNC: 32.8 GM/DL — SIGNIFICANT CHANGE UP (ref 32–36)
MCHC RBC-ENTMCNC: 33 GM/DL — SIGNIFICANT CHANGE UP (ref 32–36)
MCHC RBC-ENTMCNC: 33 GM/DL — SIGNIFICANT CHANGE UP (ref 32–36)
MCHC RBC-ENTMCNC: 33.5 GM/DL — SIGNIFICANT CHANGE UP (ref 32–36)
MCV RBC AUTO: 91.3 FL — SIGNIFICANT CHANGE UP (ref 80–100)
MCV RBC AUTO: 91.4 FL — SIGNIFICANT CHANGE UP (ref 80–100)
MCV RBC AUTO: 92.3 FL — SIGNIFICANT CHANGE UP (ref 80–100)
MCV RBC AUTO: 92.6 FL — SIGNIFICANT CHANGE UP (ref 80–100)
MCV RBC AUTO: 92.6 FL — SIGNIFICANT CHANGE UP (ref 80–100)
MCV RBC AUTO: 92.7 FL — SIGNIFICANT CHANGE UP (ref 80–100)
MCV RBC AUTO: 92.9 FL — SIGNIFICANT CHANGE UP (ref 80–100)
MCV RBC AUTO: 92.9 FL — SIGNIFICANT CHANGE UP (ref 80–100)
MCV RBC AUTO: 93 FL — SIGNIFICANT CHANGE UP (ref 80–100)
MCV RBC AUTO: 93.1 FL — SIGNIFICANT CHANGE UP (ref 80–100)
MCV RBC AUTO: 93.1 FL — SIGNIFICANT CHANGE UP (ref 80–100)
MCV RBC AUTO: 93.3 FL — SIGNIFICANT CHANGE UP (ref 80–100)
MCV RBC AUTO: 93.4 FL — SIGNIFICANT CHANGE UP (ref 80–100)
MCV RBC AUTO: 93.4 FL — SIGNIFICANT CHANGE UP (ref 80–100)
MCV RBC AUTO: 93.7 FL — SIGNIFICANT CHANGE UP (ref 80–100)
MCV RBC AUTO: 93.9 FL — SIGNIFICANT CHANGE UP (ref 80–100)
MCV RBC AUTO: 93.9 FL — SIGNIFICANT CHANGE UP (ref 80–100)
MCV RBC AUTO: 94 FL — SIGNIFICANT CHANGE UP (ref 80–100)
MCV RBC AUTO: 94.1 FL — SIGNIFICANT CHANGE UP (ref 80–100)
MCV RBC AUTO: 94.1 FL — SIGNIFICANT CHANGE UP (ref 80–100)
MCV RBC AUTO: 94.2 FL — SIGNIFICANT CHANGE UP (ref 80–100)
MCV RBC AUTO: 94.4 FL — SIGNIFICANT CHANGE UP (ref 80–100)
MCV RBC AUTO: 94.6 FL — SIGNIFICANT CHANGE UP (ref 80–100)
MCV RBC AUTO: 94.6 FL — SIGNIFICANT CHANGE UP (ref 80–100)
MCV RBC AUTO: 95.6 FL — SIGNIFICANT CHANGE UP (ref 80–100)
MCV RBC AUTO: 95.7 FL — SIGNIFICANT CHANGE UP (ref 80–100)
METAMYELOCYTES # FLD: 1.7 % — HIGH (ref 0–0)
METHOD TYPE: SIGNIFICANT CHANGE UP
MICROCYTES BLD QL: SLIGHT — SIGNIFICANT CHANGE UP
MONOCYTES # BLD AUTO: 0.7 K/UL — SIGNIFICANT CHANGE UP (ref 0–0.9)
MONOCYTES # BLD AUTO: 0.72 K/UL — SIGNIFICANT CHANGE UP (ref 0–0.9)
MONOCYTES # BLD AUTO: 0.74 K/UL — SIGNIFICANT CHANGE UP (ref 0–0.9)
MONOCYTES # BLD AUTO: 0.79 K/UL — SIGNIFICANT CHANGE UP (ref 0–0.9)
MONOCYTES # BLD AUTO: 0.92 K/UL — HIGH (ref 0–0.9)
MONOCYTES # BLD AUTO: 0.95 K/UL — HIGH (ref 0–0.9)
MONOCYTES # BLD AUTO: 0.98 K/UL — HIGH (ref 0–0.9)
MONOCYTES # BLD AUTO: 0.99 K/UL — HIGH (ref 0–0.9)
MONOCYTES # BLD AUTO: 1.03 K/UL — HIGH (ref 0–0.9)
MONOCYTES # BLD AUTO: 1.06 K/UL — HIGH (ref 0–0.9)
MONOCYTES # BLD AUTO: 1.07 K/UL — HIGH (ref 0–0.9)
MONOCYTES # BLD AUTO: 1.09 K/UL — HIGH (ref 0–0.9)
MONOCYTES # BLD AUTO: 1.12 K/UL — HIGH (ref 0–0.9)
MONOCYTES # BLD AUTO: 1.29 K/UL — HIGH (ref 0–0.9)
MONOCYTES # BLD AUTO: 1.33 K/UL — HIGH (ref 0–0.9)
MONOCYTES # BLD AUTO: 1.35 K/UL — HIGH (ref 0–0.9)
MONOCYTES # BLD AUTO: 1.72 K/UL — HIGH (ref 0–0.9)
MONOCYTES NFR BLD AUTO: 10.8 % — SIGNIFICANT CHANGE UP (ref 2–14)
MONOCYTES NFR BLD AUTO: 3.5 % — SIGNIFICANT CHANGE UP (ref 2–14)
MONOCYTES NFR BLD AUTO: 6.1 % — SIGNIFICANT CHANGE UP (ref 2–14)
MONOCYTES NFR BLD AUTO: 7 % — SIGNIFICANT CHANGE UP (ref 2–14)
MONOCYTES NFR BLD AUTO: 7.3 % — SIGNIFICANT CHANGE UP (ref 2–14)
MONOCYTES NFR BLD AUTO: 7.4 % — SIGNIFICANT CHANGE UP (ref 2–14)
MONOCYTES NFR BLD AUTO: 7.6 % — SIGNIFICANT CHANGE UP (ref 2–14)
MONOCYTES NFR BLD AUTO: 7.6 % — SIGNIFICANT CHANGE UP (ref 2–14)
MONOCYTES NFR BLD AUTO: 8 % — SIGNIFICANT CHANGE UP (ref 2–14)
MONOCYTES NFR BLD AUTO: 8.6 % — SIGNIFICANT CHANGE UP (ref 2–14)
MONOCYTES NFR BLD AUTO: 8.6 % — SIGNIFICANT CHANGE UP (ref 2–14)
MONOCYTES NFR BLD AUTO: 8.7 % — SIGNIFICANT CHANGE UP (ref 2–14)
MONOCYTES NFR BLD AUTO: 8.8 % — SIGNIFICANT CHANGE UP (ref 2–14)
MONOCYTES NFR BLD AUTO: 9.2 % — SIGNIFICANT CHANGE UP (ref 2–14)
MONOCYTES NFR BLD AUTO: 9.8 % — SIGNIFICANT CHANGE UP (ref 2–14)
MYELOCYTES NFR BLD: 3.5 % — HIGH (ref 0–0)
NEUTROPHILS # BLD AUTO: 10.74 K/UL — HIGH (ref 1.8–7.4)
NEUTROPHILS # BLD AUTO: 10.92 K/UL — HIGH (ref 1.8–7.4)
NEUTROPHILS # BLD AUTO: 11.65 K/UL — HIGH (ref 1.8–7.4)
NEUTROPHILS # BLD AUTO: 12.71 K/UL — HIGH (ref 1.8–7.4)
NEUTROPHILS # BLD AUTO: 17.99 K/UL — HIGH (ref 1.8–7.4)
NEUTROPHILS # BLD AUTO: 26.13 K/UL — HIGH (ref 1.8–7.4)
NEUTROPHILS # BLD AUTO: 6.5 K/UL — SIGNIFICANT CHANGE UP (ref 1.8–7.4)
NEUTROPHILS # BLD AUTO: 6.72 K/UL — SIGNIFICANT CHANGE UP (ref 1.8–7.4)
NEUTROPHILS # BLD AUTO: 6.96 K/UL — SIGNIFICANT CHANGE UP (ref 1.8–7.4)
NEUTROPHILS # BLD AUTO: 8.1 K/UL — HIGH (ref 1.8–7.4)
NEUTROPHILS # BLD AUTO: 8.41 K/UL — HIGH (ref 1.8–7.4)
NEUTROPHILS # BLD AUTO: 8.49 K/UL — HIGH (ref 1.8–7.4)
NEUTROPHILS # BLD AUTO: 8.86 K/UL — HIGH (ref 1.8–7.4)
NEUTROPHILS # BLD AUTO: 9.02 K/UL — HIGH (ref 1.8–7.4)
NEUTROPHILS # BLD AUTO: 9.22 K/UL — HIGH (ref 1.8–7.4)
NEUTROPHILS # BLD AUTO: 9.47 K/UL — HIGH (ref 1.8–7.4)
NEUTROPHILS # BLD AUTO: 9.52 K/UL — HIGH (ref 1.8–7.4)
NEUTROPHILS NFR BLD AUTO: 68.7 % — SIGNIFICANT CHANGE UP (ref 43–77)
NEUTROPHILS NFR BLD AUTO: 69.3 % — SIGNIFICANT CHANGE UP (ref 43–77)
NEUTROPHILS NFR BLD AUTO: 71 % — SIGNIFICANT CHANGE UP (ref 43–77)
NEUTROPHILS NFR BLD AUTO: 71.3 % — SIGNIFICANT CHANGE UP (ref 43–77)
NEUTROPHILS NFR BLD AUTO: 72.3 % — SIGNIFICANT CHANGE UP (ref 43–77)
NEUTROPHILS NFR BLD AUTO: 74 % — SIGNIFICANT CHANGE UP (ref 43–77)
NEUTROPHILS NFR BLD AUTO: 74.2 % — SIGNIFICANT CHANGE UP (ref 43–77)
NEUTROPHILS NFR BLD AUTO: 74.4 % — SIGNIFICANT CHANGE UP (ref 43–77)
NEUTROPHILS NFR BLD AUTO: 75.7 % — SIGNIFICANT CHANGE UP (ref 43–77)
NEUTROPHILS NFR BLD AUTO: 76.1 % — SIGNIFICANT CHANGE UP (ref 43–77)
NEUTROPHILS NFR BLD AUTO: 76.3 % — SIGNIFICANT CHANGE UP (ref 43–77)
NEUTROPHILS NFR BLD AUTO: 79 % — HIGH (ref 43–77)
NEUTROPHILS NFR BLD AUTO: 81 % — HIGH (ref 43–77)
NEUTROPHILS NFR BLD AUTO: 81.7 % — HIGH (ref 43–77)
NEUTROPHILS NFR BLD AUTO: 84 % — HIGH (ref 43–77)
NEUTROPHILS NFR BLD AUTO: 85.2 % — HIGH (ref 43–77)
NEUTROPHILS NFR BLD AUTO: 86 % — HIGH (ref 43–77)
NEUTS BAND # BLD: 0.9 % — SIGNIFICANT CHANGE UP (ref 0–8)
NEUTS BAND # BLD: 1.7 % — SIGNIFICANT CHANGE UP (ref 0–8)
NITRITE UR-MCNC: NEGATIVE — SIGNIFICANT CHANGE UP
NRBC # BLD: 1 /100 WBCS — HIGH (ref 0–0)
NRBC # BLD: 1 /100 WBCS — HIGH (ref 0–0)
NT-PROBNP SERPL-SCNC: 3099 PG/ML — HIGH (ref 0–300)
NT-PROBNP SERPL-SCNC: 4506 PG/ML — HIGH (ref 0–300)
NT-PROBNP SERPL-SCNC: 5408 PG/ML — HIGH (ref 0–300)
ORGANISM # SPEC MICROSCOPIC CNT: SIGNIFICANT CHANGE UP
ORGANISM # SPEC MICROSCOPIC CNT: SIGNIFICANT CHANGE UP
OTHER CELLS CSF MANUAL: 3 ML/DL — LOW (ref 18–22)
OTHER CELLS CSF MANUAL: 5 ML/DL — LOW (ref 18–22)
OVALOCYTES BLD QL SMEAR: SLIGHT — SIGNIFICANT CHANGE UP
OVALOCYTES BLD QL SMEAR: SLIGHT — SIGNIFICANT CHANGE UP
PCO2 BLDV: 42 MMHG — SIGNIFICANT CHANGE UP (ref 35–50)
PCO2 BLDV: 45 MMHG — SIGNIFICANT CHANGE UP (ref 35–50)
PCO2 BLDV: 49 MMHG — SIGNIFICANT CHANGE UP (ref 35–50)
PH BLDV: 7.44 — SIGNIFICANT CHANGE UP (ref 7.35–7.45)
PH BLDV: 7.44 — SIGNIFICANT CHANGE UP (ref 7.35–7.45)
PH BLDV: 7.46 — HIGH (ref 7.35–7.45)
PH UR: 5 — SIGNIFICANT CHANGE UP (ref 5–8)
PHOSPHATE SERPL-MCNC: 1.9 MG/DL — LOW (ref 2.4–4.7)
PHOSPHATE SERPL-MCNC: 2.1 MG/DL — LOW (ref 2.4–4.7)
PHOSPHATE SERPL-MCNC: 2.3 MG/DL — LOW (ref 2.4–4.7)
PHOSPHATE SERPL-MCNC: 2.5 MG/DL — SIGNIFICANT CHANGE UP (ref 2.4–4.7)
PHOSPHATE SERPL-MCNC: 2.5 MG/DL — SIGNIFICANT CHANGE UP (ref 2.4–4.7)
PHOSPHATE SERPL-MCNC: 2.7 MG/DL — SIGNIFICANT CHANGE UP (ref 2.4–4.7)
PHOSPHATE SERPL-MCNC: 2.9 MG/DL — SIGNIFICANT CHANGE UP (ref 2.4–4.7)
PHOSPHATE SERPL-MCNC: 2.9 MG/DL — SIGNIFICANT CHANGE UP (ref 2.4–4.7)
PHOSPHATE SERPL-MCNC: 3 MG/DL — SIGNIFICANT CHANGE UP (ref 2.4–4.7)
PHOSPHATE SERPL-MCNC: 3.1 MG/DL — SIGNIFICANT CHANGE UP (ref 2.4–4.7)
PHOSPHATE SERPL-MCNC: 3.2 MG/DL — SIGNIFICANT CHANGE UP (ref 2.4–4.7)
PHOSPHATE SERPL-MCNC: 3.3 MG/DL — SIGNIFICANT CHANGE UP (ref 2.4–4.7)
PHOSPHATE SERPL-MCNC: 3.3 MG/DL — SIGNIFICANT CHANGE UP (ref 2.4–4.7)
PHOSPHATE SERPL-MCNC: 3.4 MG/DL — SIGNIFICANT CHANGE UP (ref 2.4–4.7)
PHOSPHATE SERPL-MCNC: 3.4 MG/DL — SIGNIFICANT CHANGE UP (ref 2.4–4.7)
PHOSPHATE SERPL-MCNC: 3.9 MG/DL — SIGNIFICANT CHANGE UP (ref 2.4–4.7)
PHOSPHATE SERPL-MCNC: 4.1 MG/DL — SIGNIFICANT CHANGE UP (ref 2.4–4.7)
PLAT MORPH BLD: NORMAL — SIGNIFICANT CHANGE UP
PLATELET # BLD AUTO: 188 K/UL — SIGNIFICANT CHANGE UP (ref 150–400)
PLATELET # BLD AUTO: 194 K/UL — SIGNIFICANT CHANGE UP (ref 150–400)
PLATELET # BLD AUTO: 196 K/UL — SIGNIFICANT CHANGE UP (ref 150–400)
PLATELET # BLD AUTO: 196 K/UL — SIGNIFICANT CHANGE UP (ref 150–400)
PLATELET # BLD AUTO: 197 K/UL — SIGNIFICANT CHANGE UP (ref 150–400)
PLATELET # BLD AUTO: 215 K/UL — SIGNIFICANT CHANGE UP (ref 150–400)
PLATELET # BLD AUTO: 222 K/UL — SIGNIFICANT CHANGE UP (ref 150–400)
PLATELET # BLD AUTO: 232 K/UL — SIGNIFICANT CHANGE UP (ref 150–400)
PLATELET # BLD AUTO: 261 K/UL — SIGNIFICANT CHANGE UP (ref 150–400)
PLATELET # BLD AUTO: 269 K/UL — SIGNIFICANT CHANGE UP (ref 150–400)
PLATELET # BLD AUTO: 289 K/UL — SIGNIFICANT CHANGE UP (ref 150–400)
PLATELET # BLD AUTO: 297 K/UL — SIGNIFICANT CHANGE UP (ref 150–400)
PLATELET # BLD AUTO: 303 K/UL — SIGNIFICANT CHANGE UP (ref 150–400)
PLATELET # BLD AUTO: 304 K/UL — SIGNIFICANT CHANGE UP (ref 150–400)
PLATELET # BLD AUTO: 312 K/UL — SIGNIFICANT CHANGE UP (ref 150–400)
PLATELET # BLD AUTO: 316 K/UL — SIGNIFICANT CHANGE UP (ref 150–400)
PLATELET # BLD AUTO: 321 K/UL — SIGNIFICANT CHANGE UP (ref 150–400)
PLATELET # BLD AUTO: 326 K/UL — SIGNIFICANT CHANGE UP (ref 150–400)
PLATELET # BLD AUTO: 333 K/UL — SIGNIFICANT CHANGE UP (ref 150–400)
PLATELET # BLD AUTO: 333 K/UL — SIGNIFICANT CHANGE UP (ref 150–400)
PLATELET # BLD AUTO: 345 K/UL — SIGNIFICANT CHANGE UP (ref 150–400)
PLATELET # BLD AUTO: 349 K/UL — SIGNIFICANT CHANGE UP (ref 150–400)
PLATELET # BLD AUTO: 362 K/UL — SIGNIFICANT CHANGE UP (ref 150–400)
PLATELET # BLD AUTO: 363 K/UL — SIGNIFICANT CHANGE UP (ref 150–400)
PLATELET # BLD AUTO: 371 K/UL — SIGNIFICANT CHANGE UP (ref 150–400)
PLATELET # BLD AUTO: 377 K/UL — SIGNIFICANT CHANGE UP (ref 150–400)
PLATELET # BLD AUTO: 384 K/UL — SIGNIFICANT CHANGE UP (ref 150–400)
PLATELET # BLD AUTO: 393 K/UL — SIGNIFICANT CHANGE UP (ref 150–400)
PO2 BLDV: 26 MMHG — SIGNIFICANT CHANGE UP (ref 25–45)
PO2 BLDV: 27 MMHG — SIGNIFICANT CHANGE UP (ref 25–45)
PO2 BLDV: 33 MMHG — SIGNIFICANT CHANGE UP (ref 25–45)
POIKILOCYTOSIS BLD QL AUTO: SLIGHT — SIGNIFICANT CHANGE UP
POIKILOCYTOSIS BLD QL AUTO: SLIGHT — SIGNIFICANT CHANGE UP
POLYCHROMASIA BLD QL SMEAR: SLIGHT — SIGNIFICANT CHANGE UP
POLYCHROMASIA BLD QL SMEAR: SLIGHT — SIGNIFICANT CHANGE UP
POTASSIUM BLDV-SCNC: 3.8 MMOL/L — SIGNIFICANT CHANGE UP (ref 3.4–4.5)
POTASSIUM BLDV-SCNC: 3.9 MMOL/L — SIGNIFICANT CHANGE UP (ref 3.4–4.5)
POTASSIUM BLDV-SCNC: 4 MMOL/L — SIGNIFICANT CHANGE UP (ref 3.4–4.5)
POTASSIUM SERPL-MCNC: 2.8 MMOL/L — CRITICAL LOW (ref 3.5–5.3)
POTASSIUM SERPL-MCNC: 3.7 MMOL/L — SIGNIFICANT CHANGE UP (ref 3.5–5.3)
POTASSIUM SERPL-MCNC: 3.8 MMOL/L — SIGNIFICANT CHANGE UP (ref 3.5–5.3)
POTASSIUM SERPL-MCNC: 3.9 MMOL/L — SIGNIFICANT CHANGE UP (ref 3.5–5.3)
POTASSIUM SERPL-MCNC: 3.9 MMOL/L — SIGNIFICANT CHANGE UP (ref 3.5–5.3)
POTASSIUM SERPL-MCNC: 4 MMOL/L — SIGNIFICANT CHANGE UP (ref 3.5–5.3)
POTASSIUM SERPL-MCNC: 4.1 MMOL/L — SIGNIFICANT CHANGE UP (ref 3.5–5.3)
POTASSIUM SERPL-MCNC: 4.2 MMOL/L — SIGNIFICANT CHANGE UP (ref 3.5–5.3)
POTASSIUM SERPL-MCNC: 4.3 MMOL/L — SIGNIFICANT CHANGE UP (ref 3.5–5.3)
POTASSIUM SERPL-MCNC: 4.3 MMOL/L — SIGNIFICANT CHANGE UP (ref 3.5–5.3)
POTASSIUM SERPL-MCNC: 4.4 MMOL/L — SIGNIFICANT CHANGE UP (ref 3.5–5.3)
POTASSIUM SERPL-MCNC: 4.4 MMOL/L — SIGNIFICANT CHANGE UP (ref 3.5–5.3)
POTASSIUM SERPL-MCNC: 4.5 MMOL/L — SIGNIFICANT CHANGE UP (ref 3.5–5.3)
POTASSIUM SERPL-MCNC: 4.6 MMOL/L — SIGNIFICANT CHANGE UP (ref 3.5–5.3)
POTASSIUM SERPL-MCNC: 4.7 MMOL/L — SIGNIFICANT CHANGE UP (ref 3.5–5.3)
POTASSIUM SERPL-MCNC: 4.8 MMOL/L — SIGNIFICANT CHANGE UP (ref 3.5–5.3)
POTASSIUM SERPL-MCNC: 4.8 MMOL/L — SIGNIFICANT CHANGE UP (ref 3.5–5.3)
POTASSIUM SERPL-MCNC: 4.9 MMOL/L — SIGNIFICANT CHANGE UP (ref 3.5–5.3)
POTASSIUM SERPL-MCNC: 5.1 MMOL/L — SIGNIFICANT CHANGE UP (ref 3.5–5.3)
POTASSIUM SERPL-MCNC: 5.2 MMOL/L — SIGNIFICANT CHANGE UP (ref 3.5–5.3)
POTASSIUM SERPL-MCNC: 5.6 MMOL/L — HIGH (ref 3.5–5.3)
POTASSIUM SERPL-SCNC: 2.8 MMOL/L — CRITICAL LOW (ref 3.5–5.3)
POTASSIUM SERPL-SCNC: 3.7 MMOL/L — SIGNIFICANT CHANGE UP (ref 3.5–5.3)
POTASSIUM SERPL-SCNC: 3.8 MMOL/L — SIGNIFICANT CHANGE UP (ref 3.5–5.3)
POTASSIUM SERPL-SCNC: 3.9 MMOL/L — SIGNIFICANT CHANGE UP (ref 3.5–5.3)
POTASSIUM SERPL-SCNC: 3.9 MMOL/L — SIGNIFICANT CHANGE UP (ref 3.5–5.3)
POTASSIUM SERPL-SCNC: 4 MMOL/L — SIGNIFICANT CHANGE UP (ref 3.5–5.3)
POTASSIUM SERPL-SCNC: 4.1 MMOL/L — SIGNIFICANT CHANGE UP (ref 3.5–5.3)
POTASSIUM SERPL-SCNC: 4.2 MMOL/L — SIGNIFICANT CHANGE UP (ref 3.5–5.3)
POTASSIUM SERPL-SCNC: 4.3 MMOL/L — SIGNIFICANT CHANGE UP (ref 3.5–5.3)
POTASSIUM SERPL-SCNC: 4.3 MMOL/L — SIGNIFICANT CHANGE UP (ref 3.5–5.3)
POTASSIUM SERPL-SCNC: 4.4 MMOL/L — SIGNIFICANT CHANGE UP (ref 3.5–5.3)
POTASSIUM SERPL-SCNC: 4.4 MMOL/L — SIGNIFICANT CHANGE UP (ref 3.5–5.3)
POTASSIUM SERPL-SCNC: 4.5 MMOL/L — SIGNIFICANT CHANGE UP (ref 3.5–5.3)
POTASSIUM SERPL-SCNC: 4.6 MMOL/L — SIGNIFICANT CHANGE UP (ref 3.5–5.3)
POTASSIUM SERPL-SCNC: 4.7 MMOL/L — SIGNIFICANT CHANGE UP (ref 3.5–5.3)
POTASSIUM SERPL-SCNC: 4.8 MMOL/L — SIGNIFICANT CHANGE UP (ref 3.5–5.3)
POTASSIUM SERPL-SCNC: 4.8 MMOL/L — SIGNIFICANT CHANGE UP (ref 3.5–5.3)
POTASSIUM SERPL-SCNC: 4.9 MMOL/L — SIGNIFICANT CHANGE UP (ref 3.5–5.3)
POTASSIUM SERPL-SCNC: 5.1 MMOL/L — SIGNIFICANT CHANGE UP (ref 3.5–5.3)
POTASSIUM SERPL-SCNC: 5.2 MMOL/L — SIGNIFICANT CHANGE UP (ref 3.5–5.3)
POTASSIUM SERPL-SCNC: 5.6 MMOL/L — HIGH (ref 3.5–5.3)
PROCALCITONIN SERPL-MCNC: 0.08 NG/ML — SIGNIFICANT CHANGE UP (ref 0.02–0.1)
PROCALCITONIN SERPL-MCNC: 0.09 NG/ML — SIGNIFICANT CHANGE UP (ref 0.02–0.1)
PROCALCITONIN SERPL-MCNC: 0.25 NG/ML — HIGH (ref 0.02–0.1)
PROCALCITONIN SERPL-MCNC: 0.26 NG/ML — HIGH (ref 0.02–0.1)
PROT SERPL-MCNC: 4.7 G/DL — LOW (ref 6.6–8.7)
PROT SERPL-MCNC: 5.1 G/DL — LOW (ref 6.6–8.7)
PROT SERPL-MCNC: 5.2 G/DL — LOW (ref 6.6–8.7)
PROT SERPL-MCNC: 5.4 G/DL — LOW (ref 6.6–8.7)
PROT SERPL-MCNC: 5.4 G/DL — LOW (ref 6.6–8.7)
PROT SERPL-MCNC: 7 G/DL — SIGNIFICANT CHANGE UP (ref 6.6–8.7)
PROT SERPL-MCNC: 7.2 G/DL — SIGNIFICANT CHANGE UP (ref 6.6–8.7)
PROT SERPL-MCNC: 7.7 G/DL — SIGNIFICANT CHANGE UP (ref 6.6–8.7)
PROT UR-MCNC: 15 MG/DL
PROTHROM AB SERPL-ACNC: 12.5 SEC — SIGNIFICANT CHANGE UP (ref 10.6–13.6)
PROTHROM AB SERPL-ACNC: 13.2 SEC — SIGNIFICANT CHANGE UP (ref 10.6–13.6)
PROTHROM AB SERPL-ACNC: 14.6 SEC — HIGH (ref 10.6–13.6)
PROTHROM AB SERPL-ACNC: 14.8 SEC — HIGH (ref 10.6–13.6)
PROTHROM AB SERPL-ACNC: 15 SEC — HIGH (ref 10.6–13.6)
PROTHROM AB SERPL-ACNC: 16.3 SEC — HIGH (ref 10.6–13.6)
PROTHROM AB SERPL-ACNC: 16.8 SEC — HIGH (ref 10.6–13.6)
RBC # BLD: 2.28 M/UL — LOW (ref 3.8–5.2)
RBC # BLD: 2.42 M/UL — LOW (ref 3.8–5.2)
RBC # BLD: 2.58 M/UL — LOW (ref 3.8–5.2)
RBC # BLD: 2.66 M/UL — LOW (ref 3.8–5.2)
RBC # BLD: 2.75 M/UL — LOW (ref 3.8–5.2)
RBC # BLD: 2.79 M/UL — LOW (ref 3.8–5.2)
RBC # BLD: 2.84 M/UL — LOW (ref 3.8–5.2)
RBC # BLD: 2.86 M/UL — LOW (ref 3.8–5.2)
RBC # BLD: 2.89 M/UL — LOW (ref 3.8–5.2)
RBC # BLD: 2.89 M/UL — LOW (ref 3.8–5.2)
RBC # BLD: 2.97 M/UL — LOW (ref 3.8–5.2)
RBC # BLD: 3.01 M/UL — LOW (ref 3.8–5.2)
RBC # BLD: 3.02 M/UL — LOW (ref 3.8–5.2)
RBC # BLD: 3.06 M/UL — LOW (ref 3.8–5.2)
RBC # BLD: 3.15 M/UL — LOW (ref 3.8–5.2)
RBC # BLD: 3.18 M/UL — LOW (ref 3.8–5.2)
RBC # BLD: 3.46 M/UL — LOW (ref 3.8–5.2)
RBC # BLD: 3.5 M/UL — LOW (ref 3.8–5.2)
RBC # BLD: 3.89 M/UL — SIGNIFICANT CHANGE UP (ref 3.8–5.2)
RBC # BLD: 3.9 M/UL — SIGNIFICANT CHANGE UP (ref 3.8–5.2)
RBC # BLD: 3.92 M/UL — SIGNIFICANT CHANGE UP (ref 3.8–5.2)
RBC # BLD: 3.94 M/UL — SIGNIFICANT CHANGE UP (ref 3.8–5.2)
RBC # BLD: 4.11 M/UL — SIGNIFICANT CHANGE UP (ref 3.8–5.2)
RBC # BLD: 4.17 M/UL — SIGNIFICANT CHANGE UP (ref 3.8–5.2)
RBC # BLD: 4.28 M/UL — SIGNIFICANT CHANGE UP (ref 3.8–5.2)
RBC # BLD: 4.31 M/UL — SIGNIFICANT CHANGE UP (ref 3.8–5.2)
RBC # BLD: 4.34 M/UL — SIGNIFICANT CHANGE UP (ref 3.8–5.2)
RBC # BLD: 4.41 M/UL — SIGNIFICANT CHANGE UP (ref 3.8–5.2)
RBC # FLD: 13.9 % — SIGNIFICANT CHANGE UP (ref 10.3–14.5)
RBC # FLD: 13.9 % — SIGNIFICANT CHANGE UP (ref 10.3–14.5)
RBC # FLD: 14 % — SIGNIFICANT CHANGE UP (ref 10.3–14.5)
RBC # FLD: 14.1 % — SIGNIFICANT CHANGE UP (ref 10.3–14.5)
RBC # FLD: 14.2 % — SIGNIFICANT CHANGE UP (ref 10.3–14.5)
RBC # FLD: 14.3 % — SIGNIFICANT CHANGE UP (ref 10.3–14.5)
RBC # FLD: 14.3 % — SIGNIFICANT CHANGE UP (ref 10.3–14.5)
RBC # FLD: 14.5 % — SIGNIFICANT CHANGE UP (ref 10.3–14.5)
RBC # FLD: 14.9 % — HIGH (ref 10.3–14.5)
RBC # FLD: 14.9 % — HIGH (ref 10.3–14.5)
RBC # FLD: 15 % — HIGH (ref 10.3–14.5)
RBC # FLD: 15 % — HIGH (ref 10.3–14.5)
RBC # FLD: 15.1 % — HIGH (ref 10.3–14.5)
RBC # FLD: 15.1 % — HIGH (ref 10.3–14.5)
RBC # FLD: 15.2 % — HIGH (ref 10.3–14.5)
RBC # FLD: 15.3 % — HIGH (ref 10.3–14.5)
RBC # FLD: 15.4 % — HIGH (ref 10.3–14.5)
RBC # FLD: 15.4 % — HIGH (ref 10.3–14.5)
RBC # FLD: 15.5 % — HIGH (ref 10.3–14.5)
RBC # FLD: 15.6 % — HIGH (ref 10.3–14.5)
RBC # FLD: 15.8 % — HIGH (ref 10.3–14.5)
RBC BLD AUTO: ABNORMAL
RBC BLD AUTO: ABNORMAL
RBC BLD AUTO: NORMAL — SIGNIFICANT CHANGE UP
RBC CASTS # UR COMP ASSIST: ABNORMAL /HPF (ref 0–4)
SAO2 % BLDV: 45 % — LOW (ref 67–88)
SAO2 % BLDV: 50 % — LOW (ref 67–88)
SARS-COV-2 IGG SERPL QL IA: NEGATIVE — SIGNIFICANT CHANGE UP
SARS-COV-2 IGG SERPL QL IA: NEGATIVE — SIGNIFICANT CHANGE UP
SARS-COV-2 IGM SERPL IA-ACNC: 0.07 INDEX — SIGNIFICANT CHANGE UP
SARS-COV-2 IGM SERPL IA-ACNC: <0.1 INDEX — SIGNIFICANT CHANGE UP
SARS-COV-2 RNA SPEC QL NAA+PROBE: SIGNIFICANT CHANGE UP
SODIUM SERPL-SCNC: 133 MMOL/L — LOW (ref 135–145)
SODIUM SERPL-SCNC: 133 MMOL/L — LOW (ref 135–145)
SODIUM SERPL-SCNC: 135 MMOL/L — SIGNIFICANT CHANGE UP (ref 135–145)
SODIUM SERPL-SCNC: 136 MMOL/L — SIGNIFICANT CHANGE UP (ref 135–145)
SODIUM SERPL-SCNC: 137 MMOL/L — SIGNIFICANT CHANGE UP (ref 135–145)
SODIUM SERPL-SCNC: 138 MMOL/L — SIGNIFICANT CHANGE UP (ref 135–145)
SODIUM SERPL-SCNC: 139 MMOL/L — SIGNIFICANT CHANGE UP (ref 135–145)
SODIUM SERPL-SCNC: 140 MMOL/L — SIGNIFICANT CHANGE UP (ref 135–145)
SODIUM SERPL-SCNC: 141 MMOL/L — SIGNIFICANT CHANGE UP (ref 135–145)
SODIUM SERPL-SCNC: 141 MMOL/L — SIGNIFICANT CHANGE UP (ref 135–145)
SP GR SPEC: 1.01 — SIGNIFICANT CHANGE UP (ref 1.01–1.02)
SPECIMEN SOURCE: SIGNIFICANT CHANGE UP
SURGICAL PATHOLOGY STUDY: SIGNIFICANT CHANGE UP
T3 SERPL-MCNC: 52 NG/DL — LOW (ref 80–200)
T4 AB SER-ACNC: 5.8 UG/DL — SIGNIFICANT CHANGE UP (ref 4.5–12)
TROPONIN T SERPL-MCNC: 0.26 NG/ML — HIGH (ref 0–0.06)
TROPONIN T SERPL-MCNC: 0.27 NG/ML — HIGH (ref 0–0.06)
TROPONIN T SERPL-MCNC: 0.29 NG/ML — HIGH (ref 0–0.06)
TROPONIN T SERPL-MCNC: 0.35 NG/ML — HIGH (ref 0–0.06)
TROPONIN T SERPL-MCNC: 0.36 NG/ML — HIGH (ref 0–0.06)
TROPONIN T SERPL-MCNC: 0.4 NG/ML — HIGH (ref 0–0.06)
TROPONIN T SERPL-MCNC: 0.44 NG/ML — HIGH (ref 0–0.06)
TROPONIN T SERPL-MCNC: 0.5 NG/ML — HIGH (ref 0–0.06)
TROPONIN T SERPL-MCNC: 0.57 NG/ML — HIGH (ref 0–0.06)
TROPONIN T SERPL-MCNC: 0.62 NG/ML — HIGH (ref 0–0.06)
TROPONIN T SERPL-MCNC: 0.67 NG/ML — HIGH (ref 0–0.06)
TROPONIN T SERPL-MCNC: 0.78 NG/ML — HIGH (ref 0–0.06)
TROPONIN T SERPL-MCNC: 0.79 NG/ML — HIGH (ref 0–0.06)
TROPONIN T SERPL-MCNC: 0.9 NG/ML — HIGH (ref 0–0.06)
TROPONIN T SERPL-MCNC: 1.05 NG/ML — HIGH (ref 0–0.06)
TROPONIN T SERPL-MCNC: 1.05 NG/ML — HIGH (ref 0–0.06)
TROPONIN T SERPL-MCNC: 1.1 NG/ML — HIGH (ref 0–0.06)
TROPONIN T SERPL-MCNC: <0.01 NG/ML — SIGNIFICANT CHANGE UP (ref 0–0.06)
TSH SERPL-MCNC: 2.14 UIU/ML — SIGNIFICANT CHANGE UP (ref 0.27–4.2)
UROBILINOGEN FLD QL: 8 MG/DL
VANCOMYCIN TROUGH SERPL-MCNC: 10.7 UG/ML — SIGNIFICANT CHANGE UP (ref 10–20)
VANCOMYCIN TROUGH SERPL-MCNC: 11.7 UG/ML — SIGNIFICANT CHANGE UP (ref 10–20)
VANCOMYCIN TROUGH SERPL-MCNC: 14.3 UG/ML — SIGNIFICANT CHANGE UP (ref 10–20)
VARIANT LYMPHS # BLD: 0.9 % — SIGNIFICANT CHANGE UP (ref 0–6)
VARIANT LYMPHS # BLD: 1.7 % — SIGNIFICANT CHANGE UP (ref 0–6)
WBC # BLD: 10.13 K/UL — SIGNIFICANT CHANGE UP (ref 3.8–10.5)
WBC # BLD: 10.78 K/UL — HIGH (ref 3.8–10.5)
WBC # BLD: 11.33 K/UL — HIGH (ref 3.8–10.5)
WBC # BLD: 11.41 K/UL — HIGH (ref 3.8–10.5)
WBC # BLD: 11.46 K/UL — HIGH (ref 3.8–10.5)
WBC # BLD: 11.68 K/UL — HIGH (ref 3.8–10.5)
WBC # BLD: 11.75 K/UL — HIGH (ref 3.8–10.5)
WBC # BLD: 11.96 K/UL — HIGH (ref 3.8–10.5)
WBC # BLD: 12.12 K/UL — HIGH (ref 3.8–10.5)
WBC # BLD: 12.32 K/UL — HIGH (ref 3.8–10.5)
WBC # BLD: 12.49 K/UL — HIGH (ref 3.8–10.5)
WBC # BLD: 12.57 K/UL — HIGH (ref 3.8–10.5)
WBC # BLD: 13.79 K/UL — HIGH (ref 3.8–10.5)
WBC # BLD: 14.11 K/UL — HIGH (ref 3.8–10.5)
WBC # BLD: 14.91 K/UL — HIGH (ref 3.8–10.5)
WBC # BLD: 15.3 K/UL — HIGH (ref 3.8–10.5)
WBC # BLD: 15.37 K/UL — HIGH (ref 3.8–10.5)
WBC # BLD: 15.69 K/UL — HIGH (ref 3.8–10.5)
WBC # BLD: 21.43 K/UL — HIGH (ref 3.8–10.5)
WBC # BLD: 30.38 K/UL — HIGH (ref 3.8–10.5)
WBC # BLD: 6.96 K/UL — SIGNIFICANT CHANGE UP (ref 3.8–10.5)
WBC # BLD: 7.91 K/UL — SIGNIFICANT CHANGE UP (ref 3.8–10.5)
WBC # BLD: 8.08 K/UL — SIGNIFICANT CHANGE UP (ref 3.8–10.5)
WBC # BLD: 8.33 K/UL — SIGNIFICANT CHANGE UP (ref 3.8–10.5)
WBC # BLD: 8.61 K/UL — SIGNIFICANT CHANGE UP (ref 3.8–10.5)
WBC # BLD: 8.94 K/UL — SIGNIFICANT CHANGE UP (ref 3.8–10.5)
WBC # BLD: 9.12 K/UL — SIGNIFICANT CHANGE UP (ref 3.8–10.5)
WBC # BLD: 9.7 K/UL — SIGNIFICANT CHANGE UP (ref 3.8–10.5)
WBC # FLD AUTO: 10.13 K/UL — SIGNIFICANT CHANGE UP (ref 3.8–10.5)
WBC # FLD AUTO: 10.78 K/UL — HIGH (ref 3.8–10.5)
WBC # FLD AUTO: 11.33 K/UL — HIGH (ref 3.8–10.5)
WBC # FLD AUTO: 11.41 K/UL — HIGH (ref 3.8–10.5)
WBC # FLD AUTO: 11.46 K/UL — HIGH (ref 3.8–10.5)
WBC # FLD AUTO: 11.68 K/UL — HIGH (ref 3.8–10.5)
WBC # FLD AUTO: 11.75 K/UL — HIGH (ref 3.8–10.5)
WBC # FLD AUTO: 11.96 K/UL — HIGH (ref 3.8–10.5)
WBC # FLD AUTO: 12.12 K/UL — HIGH (ref 3.8–10.5)
WBC # FLD AUTO: 12.32 K/UL — HIGH (ref 3.8–10.5)
WBC # FLD AUTO: 12.49 K/UL — HIGH (ref 3.8–10.5)
WBC # FLD AUTO: 12.57 K/UL — HIGH (ref 3.8–10.5)
WBC # FLD AUTO: 13.79 K/UL — HIGH (ref 3.8–10.5)
WBC # FLD AUTO: 14.11 K/UL — HIGH (ref 3.8–10.5)
WBC # FLD AUTO: 14.91 K/UL — HIGH (ref 3.8–10.5)
WBC # FLD AUTO: 15.3 K/UL — HIGH (ref 3.8–10.5)
WBC # FLD AUTO: 15.37 K/UL — HIGH (ref 3.8–10.5)
WBC # FLD AUTO: 15.69 K/UL — HIGH (ref 3.8–10.5)
WBC # FLD AUTO: 21.43 K/UL — HIGH (ref 3.8–10.5)
WBC # FLD AUTO: 30.38 K/UL — HIGH (ref 3.8–10.5)
WBC # FLD AUTO: 6.96 K/UL — SIGNIFICANT CHANGE UP (ref 3.8–10.5)
WBC # FLD AUTO: 7.91 K/UL — SIGNIFICANT CHANGE UP (ref 3.8–10.5)
WBC # FLD AUTO: 8.08 K/UL — SIGNIFICANT CHANGE UP (ref 3.8–10.5)
WBC # FLD AUTO: 8.33 K/UL — SIGNIFICANT CHANGE UP (ref 3.8–10.5)
WBC # FLD AUTO: 8.61 K/UL — SIGNIFICANT CHANGE UP (ref 3.8–10.5)
WBC # FLD AUTO: 8.94 K/UL — SIGNIFICANT CHANGE UP (ref 3.8–10.5)
WBC # FLD AUTO: 9.12 K/UL — SIGNIFICANT CHANGE UP (ref 3.8–10.5)
WBC # FLD AUTO: 9.7 K/UL — SIGNIFICANT CHANGE UP (ref 3.8–10.5)
WBC UR QL: ABNORMAL

## 2021-01-01 PROCEDURE — 99223 1ST HOSP IP/OBS HIGH 75: CPT

## 2021-01-01 PROCEDURE — 99233 SBSQ HOSP IP/OBS HIGH 50: CPT

## 2021-01-01 PROCEDURE — 75635 CT ANGIO ABDOMINAL ARTERIES: CPT | Mod: 26

## 2021-01-01 PROCEDURE — 99285 EMERGENCY DEPT VISIT HI MDM: CPT | Mod: 25

## 2021-01-01 PROCEDURE — 99220: CPT

## 2021-01-01 PROCEDURE — 82962 GLUCOSE BLOOD TEST: CPT

## 2021-01-01 PROCEDURE — 80053 COMPREHEN METABOLIC PANEL: CPT

## 2021-01-01 PROCEDURE — 73562 X-RAY EXAM OF KNEE 3: CPT | Mod: 26,LT

## 2021-01-01 PROCEDURE — 96375 TX/PRO/DX INJ NEW DRUG ADDON: CPT

## 2021-01-01 PROCEDURE — 88311 DECALCIFY TISSUE: CPT | Mod: 26

## 2021-01-01 PROCEDURE — 80048 BASIC METABOLIC PNL TOTAL CA: CPT

## 2021-01-01 PROCEDURE — 85652 RBC SED RATE AUTOMATED: CPT

## 2021-01-01 PROCEDURE — 36415 COLL VENOUS BLD VENIPUNCTURE: CPT

## 2021-01-01 PROCEDURE — 93010 ELECTROCARDIOGRAM REPORT: CPT | Mod: 76

## 2021-01-01 PROCEDURE — 99291 CRITICAL CARE FIRST HOUR: CPT | Mod: 25

## 2021-01-01 PROCEDURE — 93923 UPR/LXTR ART STDY 3+ LVLS: CPT | Mod: 26

## 2021-01-01 PROCEDURE — 71045 X-RAY EXAM CHEST 1 VIEW: CPT | Mod: 26

## 2021-01-01 PROCEDURE — 70450 CT HEAD/BRAIN W/O DYE: CPT | Mod: 26

## 2021-01-01 PROCEDURE — 88304 TISSUE EXAM BY PATHOLOGIST: CPT | Mod: 26

## 2021-01-01 PROCEDURE — U0003: CPT

## 2021-01-01 PROCEDURE — 99232 SBSQ HOSP IP/OBS MODERATE 35: CPT

## 2021-01-01 PROCEDURE — 93971 EXTREMITY STUDY: CPT

## 2021-01-01 PROCEDURE — 71045 X-RAY EXAM CHEST 1 VIEW: CPT | Mod: 26,77

## 2021-01-01 PROCEDURE — 93010 ELECTROCARDIOGRAM REPORT: CPT

## 2021-01-01 PROCEDURE — 36620 INSERTION CATHETER ARTERY: CPT

## 2021-01-01 PROCEDURE — 93308 TTE F-UP OR LMTD: CPT | Mod: 26

## 2021-01-01 PROCEDURE — G0378: CPT

## 2021-01-01 PROCEDURE — 76937 US GUIDE VASCULAR ACCESS: CPT | Mod: 26

## 2021-01-01 PROCEDURE — 71275 CT ANGIOGRAPHY CHEST: CPT | Mod: 26

## 2021-01-01 PROCEDURE — 73630 X-RAY EXAM OF FOOT: CPT

## 2021-01-01 PROCEDURE — 99239 HOSP IP/OBS DSCHRG MGMT >30: CPT

## 2021-01-01 PROCEDURE — 99292 CRITICAL CARE ADDL 30 MIN: CPT | Mod: 25

## 2021-01-01 PROCEDURE — 73719 MRI LOWER EXTREMITY W/DYE: CPT

## 2021-01-01 PROCEDURE — 85025 COMPLETE CBC W/AUTO DIFF WBC: CPT

## 2021-01-01 PROCEDURE — 99222 1ST HOSP IP/OBS MODERATE 55: CPT

## 2021-01-01 PROCEDURE — 88307 TISSUE EXAM BY PATHOLOGIST: CPT | Mod: 26

## 2021-01-01 PROCEDURE — 86769 SARS-COV-2 COVID-19 ANTIBODY: CPT

## 2021-01-01 PROCEDURE — 96374 THER/PROPH/DIAG INJ IV PUSH: CPT

## 2021-01-01 PROCEDURE — 99285 EMERGENCY DEPT VISIT HI MDM: CPT

## 2021-01-01 PROCEDURE — 86140 C-REACTIVE PROTEIN: CPT

## 2021-01-01 PROCEDURE — 96376 TX/PRO/DX INJ SAME DRUG ADON: CPT

## 2021-01-01 PROCEDURE — U0005: CPT

## 2021-01-01 PROCEDURE — 85027 COMPLETE CBC AUTOMATED: CPT

## 2021-01-01 PROCEDURE — 93306 TTE W/DOPPLER COMPLETE: CPT | Mod: 26

## 2021-01-01 PROCEDURE — 83605 ASSAY OF LACTIC ACID: CPT

## 2021-01-01 PROCEDURE — 99291 CRITICAL CARE FIRST HOUR: CPT

## 2021-01-01 PROCEDURE — 99217: CPT | Mod: CS

## 2021-01-01 PROCEDURE — 73630 X-RAY EXAM OF FOOT: CPT | Mod: 26,RT

## 2021-01-01 PROCEDURE — 80202 ASSAY OF VANCOMYCIN: CPT

## 2021-01-01 PROCEDURE — 31500 INSERT EMERGENCY AIRWAY: CPT

## 2021-01-01 PROCEDURE — 99497 ADVNCD CARE PLAN 30 MIN: CPT | Mod: 25

## 2021-01-01 PROCEDURE — 73719 MRI LOWER EXTREMITY W/DYE: CPT | Mod: 26,RT

## 2021-01-01 PROCEDURE — 83036 HEMOGLOBIN GLYCOSYLATED A1C: CPT

## 2021-01-01 PROCEDURE — 87040 BLOOD CULTURE FOR BACTERIA: CPT

## 2021-01-01 RX ORDER — OXYCODONE AND ACETAMINOPHEN 5; 325 MG/1; MG/1
1 TABLET ORAL EVERY 4 HOURS
Refills: 0 | Status: DISCONTINUED | OUTPATIENT
Start: 2021-01-01 | End: 2021-01-01

## 2021-01-01 RX ORDER — FENOFIBRATE,MICRONIZED 130 MG
145 CAPSULE ORAL DAILY
Refills: 0 | Status: DISCONTINUED | OUTPATIENT
Start: 2021-01-01 | End: 2021-01-01

## 2021-01-01 RX ORDER — SODIUM CHLORIDE 9 MG/ML
1000 INJECTION, SOLUTION INTRAVENOUS
Refills: 0 | Status: DISCONTINUED | OUTPATIENT
Start: 2021-01-01 | End: 2021-01-01

## 2021-01-01 RX ORDER — METOPROLOL TARTRATE 50 MG
1 TABLET ORAL
Qty: 0 | Refills: 0 | DISCHARGE
Start: 2021-01-01

## 2021-01-01 RX ORDER — LEVOTHYROXINE SODIUM 125 MCG
1 TABLET ORAL
Qty: 0 | Refills: 0 | DISCHARGE
Start: 2021-01-01

## 2021-01-01 RX ORDER — DEXTROSE 50 % IN WATER 50 %
15 SYRINGE (ML) INTRAVENOUS ONCE
Refills: 0 | Status: DISCONTINUED | OUTPATIENT
Start: 2021-01-01 | End: 2021-01-01

## 2021-01-01 RX ORDER — HYDROMORPHONE HYDROCHLORIDE 2 MG/ML
0.5 INJECTION INTRAMUSCULAR; INTRAVENOUS; SUBCUTANEOUS EVERY 4 HOURS
Refills: 0 | Status: DISCONTINUED | OUTPATIENT
Start: 2021-01-01 | End: 2021-01-01

## 2021-01-01 RX ORDER — TAMSULOSIN HYDROCHLORIDE 0.4 MG/1
0.4 CAPSULE ORAL AT BEDTIME
Refills: 0 | Status: DISCONTINUED | OUTPATIENT
Start: 2021-01-01 | End: 2021-01-01

## 2021-01-01 RX ORDER — PROPOFOL 10 MG/ML
100 INJECTION, EMULSION INTRAVENOUS ONCE
Refills: 0 | Status: COMPLETED | OUTPATIENT
Start: 2021-01-01 | End: 2021-01-01

## 2021-01-01 RX ORDER — ACETAMINOPHEN 500 MG
975 TABLET ORAL EVERY 6 HOURS
Refills: 0 | Status: DISCONTINUED | OUTPATIENT
Start: 2021-01-01 | End: 2021-01-01

## 2021-01-01 RX ORDER — PIPERACILLIN AND TAZOBACTAM 4; .5 G/20ML; G/20ML
3.38 INJECTION, POWDER, LYOPHILIZED, FOR SOLUTION INTRAVENOUS EVERY 8 HOURS
Refills: 0 | Status: COMPLETED | OUTPATIENT
Start: 2021-01-01 | End: 2021-01-01

## 2021-01-01 RX ORDER — POLYETHYLENE GLYCOL 3350 17 G/17G
17 POWDER, FOR SOLUTION ORAL DAILY
Refills: 0 | Status: DISCONTINUED | OUTPATIENT
Start: 2021-01-01 | End: 2021-01-01

## 2021-01-01 RX ORDER — ASPIRIN/CALCIUM CARB/MAGNESIUM 324 MG
1 TABLET ORAL
Qty: 0 | Refills: 0 | DISCHARGE
Start: 2021-01-01

## 2021-01-01 RX ORDER — PROPOFOL 10 MG/ML
10 INJECTION, EMULSION INTRAVENOUS
Qty: 1000 | Refills: 0 | Status: DISCONTINUED | OUTPATIENT
Start: 2021-01-01 | End: 2021-01-01

## 2021-01-01 RX ORDER — DEXMEDETOMIDINE HYDROCHLORIDE IN 0.9% SODIUM CHLORIDE 4 UG/ML
0.2 INJECTION INTRAVENOUS
Qty: 200 | Refills: 0 | Status: DISCONTINUED | OUTPATIENT
Start: 2021-01-01 | End: 2021-01-01

## 2021-01-01 RX ORDER — MAGNESIUM SULFATE 500 MG/ML
2 VIAL (ML) INJECTION
Refills: 0 | Status: COMPLETED | OUTPATIENT
Start: 2021-01-01 | End: 2021-01-01

## 2021-01-01 RX ORDER — DEXTROSE 50 % IN WATER 50 %
25 SYRINGE (ML) INTRAVENOUS ONCE
Refills: 0 | Status: DISCONTINUED | OUTPATIENT
Start: 2021-01-01 | End: 2021-01-01

## 2021-01-01 RX ORDER — ALTEPLASE 100 MG
2 KIT INTRAVENOUS ONCE
Refills: 0 | Status: DISCONTINUED | OUTPATIENT
Start: 2021-01-01 | End: 2021-01-01

## 2021-01-01 RX ORDER — METOPROLOL TARTRATE 50 MG
5 TABLET ORAL ONCE
Refills: 0 | Status: DISCONTINUED | OUTPATIENT
Start: 2021-01-01 | End: 2021-01-01

## 2021-01-01 RX ORDER — METOPROLOL TARTRATE 50 MG
25 TABLET ORAL EVERY 6 HOURS
Refills: 0 | Status: DISCONTINUED | OUTPATIENT
Start: 2021-01-01 | End: 2021-01-01

## 2021-01-01 RX ORDER — ACETAZOLAMIDE 250 MG/1
500 TABLET ORAL EVERY 12 HOURS
Refills: 0 | Status: DISCONTINUED | OUTPATIENT
Start: 2021-01-01 | End: 2021-01-01

## 2021-01-01 RX ORDER — INSULIN GLARGINE 100 [IU]/ML
20 INJECTION, SOLUTION SUBCUTANEOUS AT BEDTIME
Refills: 0 | Status: DISCONTINUED | OUTPATIENT
Start: 2021-01-01 | End: 2021-01-01

## 2021-01-01 RX ORDER — SODIUM CHLORIDE 9 MG/ML
1000 INJECTION, SOLUTION INTRAVENOUS ONCE
Refills: 0 | Status: COMPLETED | OUTPATIENT
Start: 2021-01-01 | End: 2021-01-01

## 2021-01-01 RX ORDER — LISINOPRIL 2.5 MG/1
40 TABLET ORAL DAILY
Refills: 0 | Status: DISCONTINUED | OUTPATIENT
Start: 2021-01-01 | End: 2021-01-01

## 2021-01-01 RX ORDER — GLUCAGON INJECTION, SOLUTION 0.5 MG/.1ML
1 INJECTION, SOLUTION SUBCUTANEOUS ONCE
Refills: 0 | Status: DISCONTINUED | OUTPATIENT
Start: 2021-01-01 | End: 2021-01-01

## 2021-01-01 RX ORDER — INSULIN GLARGINE 100 [IU]/ML
30 INJECTION, SOLUTION SUBCUTANEOUS AT BEDTIME
Refills: 0 | Status: DISCONTINUED | OUTPATIENT
Start: 2021-01-01 | End: 2021-01-01

## 2021-01-01 RX ORDER — INSULIN LISPRO 100/ML
6 VIAL (ML) SUBCUTANEOUS
Refills: 0 | Status: DISCONTINUED | OUTPATIENT
Start: 2021-01-01 | End: 2021-01-01

## 2021-01-01 RX ORDER — LISINOPRIL 2.5 MG/1
1 TABLET ORAL
Qty: 0 | Refills: 0 | DISCHARGE
Start: 2021-01-01

## 2021-01-01 RX ORDER — OXYCODONE AND ACETAMINOPHEN 5; 325 MG/1; MG/1
1 TABLET ORAL EVERY 6 HOURS
Refills: 0 | Status: DISCONTINUED | OUTPATIENT
Start: 2021-01-01 | End: 2021-01-01

## 2021-01-01 RX ORDER — ASPIRIN/CALCIUM CARB/MAGNESIUM 324 MG
325 TABLET ORAL DAILY
Refills: 0 | Status: DISCONTINUED | OUTPATIENT
Start: 2021-01-01 | End: 2021-01-01

## 2021-01-01 RX ORDER — METOPROLOL TARTRATE 50 MG
50 TABLET ORAL EVERY 12 HOURS
Refills: 0 | Status: DISCONTINUED | OUTPATIENT
Start: 2021-01-01 | End: 2021-01-01

## 2021-01-01 RX ORDER — FENTANYL CITRATE 50 UG/ML
50 INJECTION INTRAVENOUS ONCE
Refills: 0 | Status: DISCONTINUED | OUTPATIENT
Start: 2021-01-01 | End: 2021-01-01

## 2021-01-01 RX ORDER — CILOSTAZOL 100 MG/1
100 TABLET ORAL EVERY 12 HOURS
Refills: 0 | Status: DISCONTINUED | OUTPATIENT
Start: 2021-01-01 | End: 2021-01-01

## 2021-01-01 RX ORDER — INSULIN HUMAN 100 [IU]/ML
5 INJECTION, SOLUTION SUBCUTANEOUS
Qty: 100 | Refills: 0 | Status: DISCONTINUED | OUTPATIENT
Start: 2021-01-01 | End: 2021-01-01

## 2021-01-01 RX ORDER — INSULIN LISPRO 100/ML
4 VIAL (ML) SUBCUTANEOUS ONCE
Refills: 0 | Status: COMPLETED | OUTPATIENT
Start: 2021-01-01 | End: 2021-01-01

## 2021-01-01 RX ORDER — NOREPINEPHRINE BITARTRATE/D5W 8 MG/250ML
0.05 PLASTIC BAG, INJECTION (ML) INTRAVENOUS
Qty: 8 | Refills: 0 | Status: DISCONTINUED | OUTPATIENT
Start: 2021-01-01 | End: 2021-01-01

## 2021-01-01 RX ORDER — HYDROMORPHONE HYDROCHLORIDE 2 MG/ML
2 INJECTION INTRAMUSCULAR; INTRAVENOUS; SUBCUTANEOUS ONCE
Refills: 0 | Status: DISCONTINUED | OUTPATIENT
Start: 2021-01-01 | End: 2021-01-01

## 2021-01-01 RX ORDER — ROBINUL 0.2 MG/ML
0.4 INJECTION INTRAMUSCULAR; INTRAVENOUS ONCE
Refills: 0 | Status: COMPLETED | OUTPATIENT
Start: 2021-01-01 | End: 2021-01-01

## 2021-01-01 RX ORDER — SACCHAROMYCES BOULARDII 250 MG
250 POWDER IN PACKET (EA) ORAL
Refills: 0 | Status: COMPLETED | OUTPATIENT
Start: 2021-01-01 | End: 2021-01-01

## 2021-01-01 RX ORDER — METOPROLOL TARTRATE 50 MG
5 TABLET ORAL EVERY 6 HOURS
Refills: 0 | Status: DISCONTINUED | OUTPATIENT
Start: 2021-01-01 | End: 2021-01-01

## 2021-01-01 RX ORDER — CHLORHEXIDINE GLUCONATE 213 G/1000ML
15 SOLUTION TOPICAL EVERY 12 HOURS
Refills: 0 | Status: DISCONTINUED | OUTPATIENT
Start: 2021-01-01 | End: 2021-01-01

## 2021-01-01 RX ORDER — LEVALBUTEROL 1.25 MG/.5ML
1.25 SOLUTION, CONCENTRATE RESPIRATORY (INHALATION) EVERY 6 HOURS
Refills: 0 | Status: DISCONTINUED | OUTPATIENT
Start: 2021-01-01 | End: 2021-01-01

## 2021-01-01 RX ORDER — INSULIN GLARGINE 100 [IU]/ML
32 INJECTION, SOLUTION SUBCUTANEOUS AT BEDTIME
Refills: 0 | Status: DISCONTINUED | OUTPATIENT
Start: 2021-01-01 | End: 2021-01-01

## 2021-01-01 RX ORDER — ATORVASTATIN CALCIUM 80 MG/1
1 TABLET, FILM COATED ORAL
Qty: 0 | Refills: 0 | DISCHARGE
Start: 2021-01-01

## 2021-01-01 RX ORDER — FUROSEMIDE 40 MG
40 TABLET ORAL ONCE
Refills: 0 | Status: COMPLETED | OUTPATIENT
Start: 2021-01-01 | End: 2021-01-01

## 2021-01-01 RX ORDER — OXYCODONE AND ACETAMINOPHEN 5; 325 MG/1; MG/1
1 TABLET ORAL ONCE
Refills: 0 | Status: DISCONTINUED | OUTPATIENT
Start: 2021-01-01 | End: 2021-01-01

## 2021-01-01 RX ORDER — VANCOMYCIN HCL 1 G
750 VIAL (EA) INTRAVENOUS EVERY 12 HOURS
Refills: 0 | Status: DISCONTINUED | OUTPATIENT
Start: 2021-01-01 | End: 2021-01-01

## 2021-01-01 RX ORDER — LANOLIN ALCOHOL/MO/W.PET/CERES
3 CREAM (GRAM) TOPICAL AT BEDTIME
Refills: 0 | Status: DISCONTINUED | OUTPATIENT
Start: 2021-01-01 | End: 2021-01-01

## 2021-01-01 RX ORDER — OXYCODONE HYDROCHLORIDE 5 MG/1
10 TABLET ORAL EVERY 4 HOURS
Refills: 0 | Status: DISCONTINUED | OUTPATIENT
Start: 2021-01-01 | End: 2021-01-01

## 2021-01-01 RX ORDER — MORPHINE SULFATE 50 MG/1
12 CAPSULE, EXTENDED RELEASE ORAL
Qty: 100 | Refills: 0 | Status: DISCONTINUED | OUTPATIENT
Start: 2021-01-01 | End: 2021-01-01

## 2021-01-01 RX ORDER — HEPARIN SODIUM 5000 [USP'U]/ML
400 INJECTION INTRAVENOUS; SUBCUTANEOUS
Qty: 25000 | Refills: 0 | Status: DISCONTINUED | OUTPATIENT
Start: 2021-01-01 | End: 2021-01-01

## 2021-01-01 RX ORDER — RANOLAZINE 500 MG/1
500 TABLET, FILM COATED, EXTENDED RELEASE ORAL
Refills: 0 | Status: DISCONTINUED | OUTPATIENT
Start: 2021-01-01 | End: 2021-01-01

## 2021-01-01 RX ORDER — ATORVASTATIN CALCIUM 80 MG/1
80 TABLET, FILM COATED ORAL AT BEDTIME
Refills: 0 | Status: DISCONTINUED | OUTPATIENT
Start: 2021-01-01 | End: 2021-01-01

## 2021-01-01 RX ORDER — ACETAMINOPHEN 500 MG
650 TABLET ORAL EVERY 6 HOURS
Refills: 0 | Status: DISCONTINUED | OUTPATIENT
Start: 2021-01-01 | End: 2021-01-01

## 2021-01-01 RX ORDER — DULAGLUTIDE 4.5 MG/.5ML
1 INJECTION, SOLUTION SUBCUTANEOUS
Qty: 0 | Refills: 0 | DISCHARGE

## 2021-01-01 RX ORDER — IPRATROPIUM/ALBUTEROL SULFATE 18-103MCG
3 AEROSOL WITH ADAPTER (GRAM) INHALATION EVERY 6 HOURS
Refills: 0 | Status: DISCONTINUED | OUTPATIENT
Start: 2021-01-01 | End: 2021-01-01

## 2021-01-01 RX ORDER — HYDROCORTISONE 20 MG
100 TABLET ORAL ONCE
Refills: 0 | Status: COMPLETED | OUTPATIENT
Start: 2021-01-01 | End: 2021-01-01

## 2021-01-01 RX ORDER — LEVOTHYROXINE SODIUM 125 MCG
88 TABLET ORAL DAILY
Refills: 0 | Status: DISCONTINUED | OUTPATIENT
Start: 2021-01-01 | End: 2021-01-01

## 2021-01-01 RX ORDER — METOPROLOL TARTRATE 50 MG
25 TABLET ORAL
Refills: 0 | Status: DISCONTINUED | OUTPATIENT
Start: 2021-01-01 | End: 2021-01-01

## 2021-01-01 RX ORDER — SENNA PLUS 8.6 MG/1
2 TABLET ORAL AT BEDTIME
Refills: 0 | Status: DISCONTINUED | OUTPATIENT
Start: 2021-01-01 | End: 2021-01-01

## 2021-01-01 RX ORDER — ENOXAPARIN SODIUM 100 MG/ML
40 INJECTION SUBCUTANEOUS DAILY
Refills: 0 | Status: DISCONTINUED | OUTPATIENT
Start: 2021-01-01 | End: 2021-01-01

## 2021-01-01 RX ORDER — INSULIN LISPRO 100/ML
VIAL (ML) SUBCUTANEOUS EVERY 6 HOURS
Refills: 0 | Status: DISCONTINUED | OUTPATIENT
Start: 2021-01-01 | End: 2021-01-01

## 2021-01-01 RX ORDER — CHLORHEXIDINE GLUCONATE 213 G/1000ML
1 SOLUTION TOPICAL
Refills: 0 | Status: DISCONTINUED | OUTPATIENT
Start: 2021-01-01 | End: 2021-01-01

## 2021-01-01 RX ORDER — PHENYLEPHRINE HYDROCHLORIDE 10 MG/ML
1 INJECTION INTRAVENOUS
Qty: 40 | Refills: 0 | Status: DISCONTINUED | OUTPATIENT
Start: 2021-01-01 | End: 2021-01-01

## 2021-01-01 RX ORDER — INSULIN GLARGINE 100 [IU]/ML
15 INJECTION, SOLUTION SUBCUTANEOUS ONCE
Refills: 0 | Status: COMPLETED | OUTPATIENT
Start: 2021-01-01 | End: 2021-01-01

## 2021-01-01 RX ORDER — VANCOMYCIN HCL 1 G
750 VIAL (EA) INTRAVENOUS ONCE
Refills: 0 | Status: DISCONTINUED | OUTPATIENT
Start: 2021-01-01 | End: 2021-01-01

## 2021-01-01 RX ORDER — SUCCINYLCHOLINE CHLORIDE 100 MG/5ML
120 SYRINGE (ML) INTRAVENOUS ONCE
Refills: 0 | Status: COMPLETED | OUTPATIENT
Start: 2021-01-01 | End: 2021-01-01

## 2021-01-01 RX ORDER — HYDROCORTISONE 20 MG
50 TABLET ORAL EVERY 8 HOURS
Refills: 0 | Status: DISCONTINUED | OUTPATIENT
Start: 2021-01-01 | End: 2021-01-01

## 2021-01-01 RX ORDER — DEXTROSE 50 % IN WATER 50 %
12.5 SYRINGE (ML) INTRAVENOUS ONCE
Refills: 0 | Status: DISCONTINUED | OUTPATIENT
Start: 2021-01-01 | End: 2021-01-01

## 2021-01-01 RX ORDER — FENTANYL CITRATE 50 UG/ML
1 INJECTION INTRAVENOUS
Qty: 2500 | Refills: 0 | Status: DISCONTINUED | OUTPATIENT
Start: 2021-01-01 | End: 2021-01-01

## 2021-01-01 RX ORDER — RANOLAZINE 500 MG/1
1 TABLET, FILM COATED, EXTENDED RELEASE ORAL
Qty: 0 | Refills: 0 | DISCHARGE
Start: 2021-01-01

## 2021-01-01 RX ORDER — INSULIN LISPRO 100/ML
VIAL (ML) SUBCUTANEOUS
Refills: 0 | Status: DISCONTINUED | OUTPATIENT
Start: 2021-01-01 | End: 2021-01-01

## 2021-01-01 RX ORDER — IPRATROPIUM BROMIDE 0.2 MG/ML
500 SOLUTION, NON-ORAL INHALATION EVERY 6 HOURS
Refills: 0 | Status: DISCONTINUED | OUTPATIENT
Start: 2021-01-01 | End: 2021-01-01

## 2021-01-01 RX ORDER — CILOSTAZOL 100 MG/1
1 TABLET ORAL
Qty: 0 | Refills: 0 | DISCHARGE
Start: 2021-01-01

## 2021-01-01 RX ORDER — HEPARIN SODIUM 5000 [USP'U]/ML
800 INJECTION INTRAVENOUS; SUBCUTANEOUS
Qty: 25000 | Refills: 0 | Status: DISCONTINUED | OUTPATIENT
Start: 2021-01-01 | End: 2021-01-01

## 2021-01-01 RX ORDER — FENOFIBRATE,MICRONIZED 130 MG
1 CAPSULE ORAL
Qty: 0 | Refills: 0 | DISCHARGE
Start: 2021-01-01

## 2021-01-01 RX ORDER — ACETAMINOPHEN 500 MG
325 TABLET ORAL EVERY 4 HOURS
Refills: 0 | Status: DISCONTINUED | OUTPATIENT
Start: 2021-01-01 | End: 2021-01-01

## 2021-01-01 RX ORDER — HEPARIN SODIUM 5000 [USP'U]/ML
4000 INJECTION INTRAVENOUS; SUBCUTANEOUS ONCE
Refills: 0 | Status: COMPLETED | OUTPATIENT
Start: 2021-01-01 | End: 2021-01-01

## 2021-01-01 RX ORDER — OXYCODONE HYDROCHLORIDE 5 MG/1
5 TABLET ORAL EVERY 6 HOURS
Refills: 0 | Status: DISCONTINUED | OUTPATIENT
Start: 2021-01-01 | End: 2021-01-01

## 2021-01-01 RX ORDER — CEFAZOLIN SODIUM 1 G
2000 VIAL (EA) INJECTION EVERY 8 HOURS
Refills: 0 | Status: DISCONTINUED | OUTPATIENT
Start: 2021-01-01 | End: 2021-01-01

## 2021-01-01 RX ORDER — ACETAMINOPHEN 500 MG
1000 TABLET ORAL ONCE
Refills: 0 | Status: COMPLETED | OUTPATIENT
Start: 2021-01-01 | End: 2021-01-01

## 2021-01-01 RX ORDER — HEPARIN SODIUM 5000 [USP'U]/ML
4000 INJECTION INTRAVENOUS; SUBCUTANEOUS EVERY 6 HOURS
Refills: 0 | Status: DISCONTINUED | OUTPATIENT
Start: 2021-01-01 | End: 2021-01-01

## 2021-01-01 RX ORDER — METOPROLOL TARTRATE 50 MG
5 TABLET ORAL ONCE
Refills: 0 | Status: COMPLETED | OUTPATIENT
Start: 2021-01-01 | End: 2021-01-01

## 2021-01-01 RX ORDER — HEPARIN SODIUM 5000 [USP'U]/ML
1400 INJECTION INTRAVENOUS; SUBCUTANEOUS
Qty: 25000 | Refills: 0 | Status: DISCONTINUED | OUTPATIENT
Start: 2021-01-01 | End: 2021-01-01

## 2021-01-01 RX ORDER — INSULIN DEGLUDEC 100 U/ML
22 INJECTION, SOLUTION SUBCUTANEOUS
Qty: 0 | Refills: 0 | DISCHARGE

## 2021-01-01 RX ORDER — FENTANYL CITRATE 50 UG/ML
25 INJECTION INTRAVENOUS
Refills: 0 | Status: DISCONTINUED | OUTPATIENT
Start: 2021-01-01 | End: 2021-01-01

## 2021-01-01 RX ORDER — OXYCODONE AND ACETAMINOPHEN 5; 325 MG/1; MG/1
2 TABLET ORAL EVERY 4 HOURS
Refills: 0 | Status: DISCONTINUED | OUTPATIENT
Start: 2021-01-01 | End: 2021-01-01

## 2021-01-01 RX ORDER — RAMIPRIL 5 MG
1 CAPSULE ORAL
Qty: 0 | Refills: 0 | DISCHARGE

## 2021-01-01 RX ORDER — SODIUM CHLORIDE 9 MG/ML
500 INJECTION, SOLUTION INTRAVENOUS
Refills: 0 | Status: DISCONTINUED | OUTPATIENT
Start: 2021-01-01 | End: 2021-01-01

## 2021-01-01 RX ORDER — VANCOMYCIN HCL 1 G
1000 VIAL (EA) INTRAVENOUS ONCE
Refills: 0 | Status: COMPLETED | OUTPATIENT
Start: 2021-01-01 | End: 2021-01-01

## 2021-01-01 RX ORDER — LEVOTHYROXINE SODIUM 125 MCG
44 TABLET ORAL AT BEDTIME
Refills: 0 | Status: DISCONTINUED | OUTPATIENT
Start: 2021-01-01 | End: 2021-01-01

## 2021-01-01 RX ORDER — SODIUM CHLORIDE 9 MG/ML
500 INJECTION INTRAMUSCULAR; INTRAVENOUS; SUBCUTANEOUS ONCE
Refills: 0 | Status: COMPLETED | OUTPATIENT
Start: 2021-01-01 | End: 2021-01-01

## 2021-01-01 RX ORDER — INSULIN GLARGINE 100 [IU]/ML
15 INJECTION, SOLUTION SUBCUTANEOUS AT BEDTIME
Refills: 0 | Status: DISCONTINUED | OUTPATIENT
Start: 2021-01-01 | End: 2021-01-01

## 2021-01-01 RX ORDER — OXYCODONE HYDROCHLORIDE 5 MG/1
5 TABLET ORAL EVERY 8 HOURS
Refills: 0 | Status: DISCONTINUED | OUTPATIENT
Start: 2021-01-01 | End: 2021-01-01

## 2021-01-01 RX ORDER — VANCOMYCIN HCL 1 G
500 VIAL (EA) INTRAVENOUS EVERY 12 HOURS
Refills: 0 | Status: DISCONTINUED | OUTPATIENT
Start: 2021-01-01 | End: 2021-01-01

## 2021-01-01 RX ORDER — FUROSEMIDE 40 MG
20 TABLET ORAL ONCE
Refills: 0 | Status: COMPLETED | OUTPATIENT
Start: 2021-01-01 | End: 2021-01-01

## 2021-01-01 RX ORDER — CEFAZOLIN SODIUM 1 G
1000 VIAL (EA) INJECTION EVERY 8 HOURS
Refills: 0 | Status: DISCONTINUED | OUTPATIENT
Start: 2021-01-01 | End: 2021-01-01

## 2021-01-01 RX ORDER — HEPARIN SODIUM 5000 [USP'U]/ML
1500 INJECTION INTRAVENOUS; SUBCUTANEOUS
Qty: 25000 | Refills: 0 | Status: DISCONTINUED | OUTPATIENT
Start: 2021-01-01 | End: 2021-01-01

## 2021-01-01 RX ORDER — LEVALBUTEROL 1.25 MG/.5ML
1.25 SOLUTION, CONCENTRATE RESPIRATORY (INHALATION) EVERY 8 HOURS
Refills: 0 | Status: DISCONTINUED | OUTPATIENT
Start: 2021-01-01 | End: 2021-01-01

## 2021-01-01 RX ORDER — INSULIN GLARGINE 100 [IU]/ML
22 INJECTION, SOLUTION SUBCUTANEOUS AT BEDTIME
Refills: 0 | Status: DISCONTINUED | OUTPATIENT
Start: 2021-01-01 | End: 2021-01-01

## 2021-01-01 RX ORDER — METOPROLOL TARTRATE 50 MG
2.5 TABLET ORAL ONCE
Refills: 0 | Status: COMPLETED | OUTPATIENT
Start: 2021-01-01 | End: 2021-01-01

## 2021-01-01 RX ORDER — HEPARIN SODIUM 5000 [USP'U]/ML
8000 INJECTION INTRAVENOUS; SUBCUTANEOUS EVERY 6 HOURS
Refills: 0 | Status: DISCONTINUED | OUTPATIENT
Start: 2021-01-01 | End: 2021-01-01

## 2021-01-01 RX ORDER — HYDROMORPHONE HYDROCHLORIDE 2 MG/ML
0.5 INJECTION INTRAMUSCULAR; INTRAVENOUS; SUBCUTANEOUS
Refills: 0 | Status: DISCONTINUED | OUTPATIENT
Start: 2021-01-01 | End: 2021-01-01

## 2021-01-01 RX ORDER — FENTANYL CITRATE 50 UG/ML
200 INJECTION INTRAVENOUS ONCE
Refills: 0 | Status: DISCONTINUED | OUTPATIENT
Start: 2021-01-01 | End: 2021-01-01

## 2021-01-01 RX ORDER — OXYCODONE HYDROCHLORIDE 5 MG/1
2.5 TABLET ORAL EVERY 6 HOURS
Refills: 0 | Status: DISCONTINUED | OUTPATIENT
Start: 2021-01-01 | End: 2021-01-01

## 2021-01-01 RX ORDER — INSULIN GLARGINE 100 [IU]/ML
5 INJECTION, SOLUTION SUBCUTANEOUS AT BEDTIME
Refills: 0 | Status: DISCONTINUED | OUTPATIENT
Start: 2021-01-01 | End: 2021-01-01

## 2021-01-01 RX ORDER — METOPROLOL TARTRATE 50 MG
100 TABLET ORAL EVERY 12 HOURS
Refills: 0 | Status: DISCONTINUED | OUTPATIENT
Start: 2021-01-01 | End: 2021-01-01

## 2021-01-01 RX ORDER — CHLORHEXIDINE GLUCONATE 213 G/1000ML
15 SOLUTION TOPICAL
Refills: 0 | Status: DISCONTINUED | OUTPATIENT
Start: 2021-01-01 | End: 2021-01-01

## 2021-01-01 RX ORDER — MIDAZOLAM HYDROCHLORIDE 1 MG/ML
2 INJECTION, SOLUTION INTRAMUSCULAR; INTRAVENOUS ONCE
Refills: 0 | Status: DISCONTINUED | OUTPATIENT
Start: 2021-01-01 | End: 2021-01-01

## 2021-01-01 RX ORDER — OXYCODONE HYDROCHLORIDE 5 MG/1
10 TABLET ORAL EVERY 6 HOURS
Refills: 0 | Status: DISCONTINUED | OUTPATIENT
Start: 2021-01-01 | End: 2021-01-01

## 2021-01-01 RX ORDER — FENTANYL CITRATE 50 UG/ML
0.5 INJECTION INTRAVENOUS
Qty: 2500 | Refills: 0 | Status: DISCONTINUED | OUTPATIENT
Start: 2021-01-01 | End: 2021-01-01

## 2021-01-01 RX ORDER — CEPHALEXIN 500 MG
1 CAPSULE ORAL
Qty: 10 | Refills: 0
Start: 2021-01-01 | End: 2021-01-01

## 2021-01-01 RX ORDER — VASOPRESSIN 20 [USP'U]/ML
0.04 INJECTION INTRAVENOUS
Qty: 50 | Refills: 0 | Status: DISCONTINUED | OUTPATIENT
Start: 2021-01-01 | End: 2021-01-01

## 2021-01-01 RX ORDER — VANCOMYCIN HCL 1 G
1000 VIAL (EA) INTRAVENOUS EVERY 12 HOURS
Refills: 0 | Status: DISCONTINUED | OUTPATIENT
Start: 2021-01-01 | End: 2021-01-01

## 2021-01-01 RX ORDER — PANTOPRAZOLE SODIUM 20 MG/1
40 TABLET, DELAYED RELEASE ORAL DAILY
Refills: 0 | Status: DISCONTINUED | OUTPATIENT
Start: 2021-01-01 | End: 2021-01-01

## 2021-01-01 RX ORDER — SODIUM CHLORIDE 9 MG/ML
500 INJECTION, SOLUTION INTRAVENOUS ONCE
Refills: 0 | Status: COMPLETED | OUTPATIENT
Start: 2021-01-01 | End: 2021-01-01

## 2021-01-01 RX ORDER — HEPARIN SODIUM 5000 [USP'U]/ML
8000 INJECTION INTRAVENOUS; SUBCUTANEOUS ONCE
Refills: 0 | Status: COMPLETED | OUTPATIENT
Start: 2021-01-01 | End: 2021-01-01

## 2021-01-01 RX ORDER — OXYCODONE HYDROCHLORIDE 5 MG/1
5 TABLET ORAL EVERY 4 HOURS
Refills: 0 | Status: DISCONTINUED | OUTPATIENT
Start: 2021-01-01 | End: 2021-01-01

## 2021-01-01 RX ORDER — ALBUMIN HUMAN 25 %
250 VIAL (ML) INTRAVENOUS ONCE
Refills: 0 | Status: COMPLETED | OUTPATIENT
Start: 2021-01-01 | End: 2021-01-01

## 2021-01-01 RX ORDER — HEPARIN SODIUM 5000 [USP'U]/ML
1300 INJECTION INTRAVENOUS; SUBCUTANEOUS
Qty: 25000 | Refills: 0 | Status: DISCONTINUED | OUTPATIENT
Start: 2021-01-01 | End: 2021-01-01

## 2021-01-01 RX ORDER — INSULIN LISPRO 100/ML
VIAL (ML) SUBCUTANEOUS EVERY 4 HOURS
Refills: 0 | Status: DISCONTINUED | OUTPATIENT
Start: 2021-01-01 | End: 2021-01-01

## 2021-01-01 RX ORDER — DEXMEDETOMIDINE HYDROCHLORIDE IN 0.9% SODIUM CHLORIDE 4 UG/ML
1 INJECTION INTRAVENOUS
Qty: 200 | Refills: 0 | Status: DISCONTINUED | OUTPATIENT
Start: 2021-01-01 | End: 2021-01-01

## 2021-01-01 RX ORDER — PHENYLEPHRINE HYDROCHLORIDE 10 MG/ML
0.5 INJECTION INTRAVENOUS
Qty: 40 | Refills: 0 | Status: DISCONTINUED | OUTPATIENT
Start: 2021-01-01 | End: 2021-01-01

## 2021-01-01 RX ORDER — HYDROCORTISONE 20 MG
100 TABLET ORAL ONCE
Refills: 0 | Status: DISCONTINUED | OUTPATIENT
Start: 2021-01-01 | End: 2021-01-01

## 2021-01-01 RX ORDER — ONDANSETRON 8 MG/1
4 TABLET, FILM COATED ORAL EVERY 6 HOURS
Refills: 0 | Status: DISCONTINUED | OUTPATIENT
Start: 2021-01-01 | End: 2021-01-01

## 2021-01-01 RX ORDER — INSULIN GLARGINE 100 [IU]/ML
15 INJECTION, SOLUTION SUBCUTANEOUS EVERY MORNING
Refills: 0 | Status: DISCONTINUED | OUTPATIENT
Start: 2021-01-01 | End: 2021-01-01

## 2021-01-01 RX ORDER — GABAPENTIN 400 MG/1
100 CAPSULE ORAL EVERY 6 HOURS
Refills: 0 | Status: DISCONTINUED | OUTPATIENT
Start: 2021-01-01 | End: 2021-01-01

## 2021-01-01 RX ORDER — FENOFIBRATE,MICRONIZED 130 MG
1 CAPSULE ORAL
Qty: 0 | Refills: 0 | DISCHARGE

## 2021-01-01 RX ORDER — VANCOMYCIN HCL 1 G
750 VIAL (EA) INTRAVENOUS ONCE
Refills: 0 | Status: COMPLETED | OUTPATIENT
Start: 2021-01-01 | End: 2021-01-01

## 2021-01-01 RX ORDER — SODIUM CHLORIDE 9 MG/ML
500 INJECTION INTRAMUSCULAR; INTRAVENOUS; SUBCUTANEOUS ONCE
Refills: 0 | Status: DISCONTINUED | OUTPATIENT
Start: 2021-01-01 | End: 2021-01-01

## 2021-01-01 RX ORDER — ROBINUL 0.2 MG/ML
0.2 INJECTION INTRAMUSCULAR; INTRAVENOUS EVERY 6 HOURS
Refills: 0 | Status: DISCONTINUED | OUTPATIENT
Start: 2021-01-01 | End: 2021-01-01

## 2021-01-01 RX ORDER — DEXMEDETOMIDINE HYDROCHLORIDE IN 0.9% SODIUM CHLORIDE 4 UG/ML
0.3 INJECTION INTRAVENOUS
Qty: 200 | Refills: 0 | Status: DISCONTINUED | OUTPATIENT
Start: 2021-01-01 | End: 2021-01-01

## 2021-01-01 RX ORDER — INSULIN HUMAN 100 [IU]/ML
4 INJECTION, SOLUTION SUBCUTANEOUS
Qty: 100 | Refills: 0 | Status: DISCONTINUED | OUTPATIENT
Start: 2021-01-01 | End: 2021-01-01

## 2021-01-01 RX ORDER — PIPERACILLIN AND TAZOBACTAM 4; .5 G/20ML; G/20ML
3.38 INJECTION, POWDER, LYOPHILIZED, FOR SOLUTION INTRAVENOUS ONCE
Refills: 0 | Status: COMPLETED | OUTPATIENT
Start: 2021-01-01 | End: 2021-01-01

## 2021-01-01 RX ORDER — FUROSEMIDE 40 MG
40 TABLET ORAL EVERY 12 HOURS
Refills: 0 | Status: DISCONTINUED | OUTPATIENT
Start: 2021-01-01 | End: 2021-01-01

## 2021-01-01 RX ORDER — METOPROLOL TARTRATE 50 MG
200 TABLET ORAL DAILY
Refills: 0 | Status: DISCONTINUED | OUTPATIENT
Start: 2021-01-01 | End: 2021-01-01

## 2021-01-01 RX ORDER — INSULIN LISPRO 100/ML
VIAL (ML) SUBCUTANEOUS AT BEDTIME
Refills: 0 | Status: DISCONTINUED | OUTPATIENT
Start: 2021-01-01 | End: 2021-01-01

## 2021-01-01 RX ADMIN — FENTANYL CITRATE 200 MICROGRAM(S): 50 INJECTION INTRAVENOUS at 16:53

## 2021-01-01 RX ADMIN — Medication 4: at 11:30

## 2021-01-01 RX ADMIN — HEPARIN SODIUM 1400 UNIT(S)/HR: 5000 INJECTION INTRAVENOUS; SUBCUTANEOUS at 12:14

## 2021-01-01 RX ADMIN — Medication 145 MILLIGRAM(S): at 11:08

## 2021-01-01 RX ADMIN — Medication 88 MICROGRAM(S): at 13:43

## 2021-01-01 RX ADMIN — Medication 325 MILLIGRAM(S): at 11:24

## 2021-01-01 RX ADMIN — CILOSTAZOL 100 MILLIGRAM(S): 100 TABLET ORAL at 16:43

## 2021-01-01 RX ADMIN — HEPARIN SODIUM 1500 UNIT(S)/HR: 5000 INJECTION INTRAVENOUS; SUBCUTANEOUS at 06:10

## 2021-01-01 RX ADMIN — PIPERACILLIN AND TAZOBACTAM 25 GRAM(S): 4; .5 INJECTION, POWDER, LYOPHILIZED, FOR SOLUTION INTRAVENOUS at 05:42

## 2021-01-01 RX ADMIN — Medication 100 MILLIGRAM(S): at 17:52

## 2021-01-01 RX ADMIN — OXYCODONE AND ACETAMINOPHEN 1 TABLET(S): 5; 325 TABLET ORAL at 15:17

## 2021-01-01 RX ADMIN — Medication 2: at 08:14

## 2021-01-01 RX ADMIN — OXYCODONE AND ACETAMINOPHEN 1 TABLET(S): 5; 325 TABLET ORAL at 01:23

## 2021-01-01 RX ADMIN — ENOXAPARIN SODIUM 40 MILLIGRAM(S): 100 INJECTION SUBCUTANEOUS at 11:31

## 2021-01-01 RX ADMIN — Medication 4 MILLIGRAM(S): at 16:18

## 2021-01-01 RX ADMIN — POLYETHYLENE GLYCOL 3350 17 GRAM(S): 17 POWDER, FOR SOLUTION ORAL at 11:10

## 2021-01-01 RX ADMIN — Medication 25 MILLIGRAM(S): at 23:05

## 2021-01-01 RX ADMIN — Medication 325 MILLIGRAM(S): at 11:26

## 2021-01-01 RX ADMIN — Medication 25 MILLIGRAM(S): at 23:46

## 2021-01-01 RX ADMIN — PIPERACILLIN AND TAZOBACTAM 25 GRAM(S): 4; .5 INJECTION, POWDER, LYOPHILIZED, FOR SOLUTION INTRAVENOUS at 05:23

## 2021-01-01 RX ADMIN — Medication 200 MILLIGRAM(S): at 05:41

## 2021-01-01 RX ADMIN — LEVALBUTEROL 1.25 MILLIGRAM(S): 1.25 SOLUTION, CONCENTRATE RESPIRATORY (INHALATION) at 08:18

## 2021-01-01 RX ADMIN — RANOLAZINE 500 MILLIGRAM(S): 500 TABLET, FILM COATED, EXTENDED RELEASE ORAL at 17:52

## 2021-01-01 RX ADMIN — HYDROMORPHONE HYDROCHLORIDE 0.5 MILLIGRAM(S): 2 INJECTION INTRAMUSCULAR; INTRAVENOUS; SUBCUTANEOUS at 12:24

## 2021-01-01 RX ADMIN — Medication 2: at 17:33

## 2021-01-01 RX ADMIN — Medication 400 MILLIGRAM(S): at 19:38

## 2021-01-01 RX ADMIN — Medication 500 MICROGRAM(S): at 08:15

## 2021-01-01 RX ADMIN — RANOLAZINE 500 MILLIGRAM(S): 500 TABLET, FILM COATED, EXTENDED RELEASE ORAL at 18:27

## 2021-01-01 RX ADMIN — Medication 50 GRAM(S): at 05:32

## 2021-01-01 RX ADMIN — DEXMEDETOMIDINE HYDROCHLORIDE IN 0.9% SODIUM CHLORIDE 7.24 MICROGRAM(S)/KG/HR: 4 INJECTION INTRAVENOUS at 05:04

## 2021-01-01 RX ADMIN — FENTANYL CITRATE 50 MICROGRAM(S): 50 INJECTION INTRAVENOUS at 17:48

## 2021-01-01 RX ADMIN — ENOXAPARIN SODIUM 40 MILLIGRAM(S): 100 INJECTION SUBCUTANEOUS at 12:22

## 2021-01-01 RX ADMIN — RANOLAZINE 500 MILLIGRAM(S): 500 TABLET, FILM COATED, EXTENDED RELEASE ORAL at 16:28

## 2021-01-01 RX ADMIN — CILOSTAZOL 100 MILLIGRAM(S): 100 TABLET ORAL at 05:28

## 2021-01-01 RX ADMIN — Medication 200 MILLIGRAM(S): at 05:03

## 2021-01-01 RX ADMIN — Medication 2: at 08:08

## 2021-01-01 RX ADMIN — Medication 85 MILLIMOLE(S): at 06:01

## 2021-01-01 RX ADMIN — FENTANYL CITRATE 9.65 MICROGRAM(S)/KG/HR: 50 INJECTION INTRAVENOUS at 15:25

## 2021-01-01 RX ADMIN — Medication 88 MICROGRAM(S): at 06:08

## 2021-01-01 RX ADMIN — RANOLAZINE 500 MILLIGRAM(S): 500 TABLET, FILM COATED, EXTENDED RELEASE ORAL at 16:53

## 2021-01-01 RX ADMIN — Medication 25 MILLIGRAM(S): at 05:51

## 2021-01-01 RX ADMIN — CILOSTAZOL 100 MILLIGRAM(S): 100 TABLET ORAL at 05:40

## 2021-01-01 RX ADMIN — HYDROMORPHONE HYDROCHLORIDE 0.5 MILLIGRAM(S): 2 INJECTION INTRAMUSCULAR; INTRAVENOUS; SUBCUTANEOUS at 21:38

## 2021-01-01 RX ADMIN — CILOSTAZOL 100 MILLIGRAM(S): 100 TABLET ORAL at 05:24

## 2021-01-01 RX ADMIN — Medication 500 MICROGRAM(S): at 13:42

## 2021-01-01 RX ADMIN — RANOLAZINE 500 MILLIGRAM(S): 500 TABLET, FILM COATED, EXTENDED RELEASE ORAL at 05:28

## 2021-01-01 RX ADMIN — Medication 100 MILLIGRAM(S): at 00:52

## 2021-01-01 RX ADMIN — SENNA PLUS 2 TABLET(S): 8.6 TABLET ORAL at 21:46

## 2021-01-01 RX ADMIN — RANOLAZINE 500 MILLIGRAM(S): 500 TABLET, FILM COATED, EXTENDED RELEASE ORAL at 05:23

## 2021-01-01 RX ADMIN — ENOXAPARIN SODIUM 40 MILLIGRAM(S): 100 INJECTION SUBCUTANEOUS at 12:51

## 2021-01-01 RX ADMIN — ATORVASTATIN CALCIUM 80 MILLIGRAM(S): 80 TABLET, FILM COATED ORAL at 21:35

## 2021-01-01 RX ADMIN — CHLORHEXIDINE GLUCONATE 1 APPLICATION(S): 213 SOLUTION TOPICAL at 05:06

## 2021-01-01 RX ADMIN — CHLORHEXIDINE GLUCONATE 15 MILLILITER(S): 213 SOLUTION TOPICAL at 05:02

## 2021-01-01 RX ADMIN — INSULIN HUMAN 4 UNIT(S)/HR: 100 INJECTION, SOLUTION SUBCUTANEOUS at 09:42

## 2021-01-01 RX ADMIN — OXYCODONE AND ACETAMINOPHEN 1 TABLET(S): 5; 325 TABLET ORAL at 21:16

## 2021-01-01 RX ADMIN — Medication 100 MILLIGRAM(S): at 21:38

## 2021-01-01 RX ADMIN — Medication 100 MILLIGRAM(S): at 16:52

## 2021-01-01 RX ADMIN — OXYCODONE AND ACETAMINOPHEN 1 TABLET(S): 5; 325 TABLET ORAL at 05:52

## 2021-01-01 RX ADMIN — Medication 2: at 11:00

## 2021-01-01 RX ADMIN — HYDROMORPHONE HYDROCHLORIDE 0.5 MILLIGRAM(S): 2 INJECTION INTRAMUSCULAR; INTRAVENOUS; SUBCUTANEOUS at 06:09

## 2021-01-01 RX ADMIN — SODIUM CHLORIDE 50 MILLILITER(S): 9 INJECTION, SOLUTION INTRAVENOUS at 21:00

## 2021-01-01 RX ADMIN — SODIUM CHLORIDE 1000 MILLILITER(S): 9 INJECTION, SOLUTION INTRAVENOUS at 19:40

## 2021-01-01 RX ADMIN — CHLORHEXIDINE GLUCONATE 1 APPLICATION(S): 213 SOLUTION TOPICAL at 04:38

## 2021-01-01 RX ADMIN — OXYCODONE AND ACETAMINOPHEN 1 TABLET(S): 5; 325 TABLET ORAL at 14:25

## 2021-01-01 RX ADMIN — Medication 250 MILLIGRAM(S): at 17:14

## 2021-01-01 RX ADMIN — RANOLAZINE 500 MILLIGRAM(S): 500 TABLET, FILM COATED, EXTENDED RELEASE ORAL at 05:43

## 2021-01-01 RX ADMIN — Medication 145 MILLIGRAM(S): at 14:35

## 2021-01-01 RX ADMIN — Medication 4: at 17:20

## 2021-01-01 RX ADMIN — Medication 85 MILLIMOLE(S): at 06:44

## 2021-01-01 RX ADMIN — Medication 1 TABLET(S): at 05:50

## 2021-01-01 RX ADMIN — FENTANYL CITRATE 25 MICROGRAM(S): 50 INJECTION INTRAVENOUS at 19:20

## 2021-01-01 RX ADMIN — Medication 145 MILLIGRAM(S): at 11:18

## 2021-01-01 RX ADMIN — SODIUM CHLORIDE 1000 MILLILITER(S): 9 INJECTION, SOLUTION INTRAVENOUS at 04:00

## 2021-01-01 RX ADMIN — Medication 88 MICROGRAM(S): at 05:52

## 2021-01-01 RX ADMIN — RANOLAZINE 500 MILLIGRAM(S): 500 TABLET, FILM COATED, EXTENDED RELEASE ORAL at 05:36

## 2021-01-01 RX ADMIN — Medication 3 MILLILITER(S): at 03:46

## 2021-01-01 RX ADMIN — Medication 88 MICROGRAM(S): at 05:03

## 2021-01-01 RX ADMIN — INSULIN GLARGINE 15 UNIT(S): 100 INJECTION, SOLUTION SUBCUTANEOUS at 22:21

## 2021-01-01 RX ADMIN — Medication 20 MILLIGRAM(S): at 16:38

## 2021-01-01 RX ADMIN — SODIUM CHLORIDE 100 MILLILITER(S): 9 INJECTION, SOLUTION INTRAVENOUS at 05:03

## 2021-01-01 RX ADMIN — Medication 325 MILLIGRAM(S): at 11:00

## 2021-01-01 RX ADMIN — Medication 145 MILLIGRAM(S): at 12:54

## 2021-01-01 RX ADMIN — Medication 88 MICROGRAM(S): at 05:27

## 2021-01-01 RX ADMIN — ATORVASTATIN CALCIUM 80 MILLIGRAM(S): 80 TABLET, FILM COATED ORAL at 22:48

## 2021-01-01 RX ADMIN — Medication 88 MICROGRAM(S): at 06:03

## 2021-01-01 RX ADMIN — HEPARIN SODIUM 1300 UNIT(S)/HR: 5000 INJECTION INTRAVENOUS; SUBCUTANEOUS at 03:22

## 2021-01-01 RX ADMIN — Medication 25 MILLIGRAM(S): at 17:48

## 2021-01-01 RX ADMIN — CILOSTAZOL 100 MILLIGRAM(S): 100 TABLET ORAL at 10:15

## 2021-01-01 RX ADMIN — Medication 145 MILLIGRAM(S): at 13:12

## 2021-01-01 RX ADMIN — HEPARIN SODIUM 1500 UNIT(S)/HR: 5000 INJECTION INTRAVENOUS; SUBCUTANEOUS at 17:48

## 2021-01-01 RX ADMIN — Medication 88 MICROGRAM(S): at 04:57

## 2021-01-01 RX ADMIN — INSULIN GLARGINE 20 UNIT(S): 100 INJECTION, SOLUTION SUBCUTANEOUS at 21:48

## 2021-01-01 RX ADMIN — Medication 500 MICROGRAM(S): at 08:18

## 2021-01-01 RX ADMIN — SENNA PLUS 2 TABLET(S): 8.6 TABLET ORAL at 21:39

## 2021-01-01 RX ADMIN — OXYCODONE HYDROCHLORIDE 10 MILLIGRAM(S): 5 TABLET ORAL at 12:24

## 2021-01-01 RX ADMIN — ATORVASTATIN CALCIUM 80 MILLIGRAM(S): 80 TABLET, FILM COATED ORAL at 21:46

## 2021-01-01 RX ADMIN — RANOLAZINE 500 MILLIGRAM(S): 500 TABLET, FILM COATED, EXTENDED RELEASE ORAL at 05:42

## 2021-01-01 RX ADMIN — RANOLAZINE 500 MILLIGRAM(S): 500 TABLET, FILM COATED, EXTENDED RELEASE ORAL at 16:27

## 2021-01-01 RX ADMIN — Medication 145 MILLIGRAM(S): at 10:28

## 2021-01-01 RX ADMIN — Medication 975 MILLIGRAM(S): at 05:02

## 2021-01-01 RX ADMIN — Medication 40 MILLIGRAM(S): at 10:48

## 2021-01-01 RX ADMIN — Medication 650 MILLIGRAM(S): at 02:47

## 2021-01-01 RX ADMIN — OXYCODONE HYDROCHLORIDE 10 MILLIGRAM(S): 5 TABLET ORAL at 17:33

## 2021-01-01 RX ADMIN — Medication 100 MILLIGRAM(S): at 17:42

## 2021-01-01 RX ADMIN — HYDROMORPHONE HYDROCHLORIDE 0.5 MILLIGRAM(S): 2 INJECTION INTRAMUSCULAR; INTRAVENOUS; SUBCUTANEOUS at 05:45

## 2021-01-01 RX ADMIN — HEPARIN SODIUM 1300 UNIT(S)/HR: 5000 INJECTION INTRAVENOUS; SUBCUTANEOUS at 00:25

## 2021-01-01 RX ADMIN — RANOLAZINE 500 MILLIGRAM(S): 500 TABLET, FILM COATED, EXTENDED RELEASE ORAL at 04:57

## 2021-01-01 RX ADMIN — Medication 500 MICROGRAM(S): at 20:25

## 2021-01-01 RX ADMIN — Medication 2: at 11:34

## 2021-01-01 RX ADMIN — Medication 100 MILLIGRAM(S): at 05:48

## 2021-01-01 RX ADMIN — Medication 88 MICROGRAM(S): at 06:02

## 2021-01-01 RX ADMIN — ATORVASTATIN CALCIUM 80 MILLIGRAM(S): 80 TABLET, FILM COATED ORAL at 21:39

## 2021-01-01 RX ADMIN — ROBINUL 0.2 MILLIGRAM(S): 0.2 INJECTION INTRAMUSCULAR; INTRAVENOUS at 16:19

## 2021-01-01 RX ADMIN — Medication 50 MILLIGRAM(S): at 05:43

## 2021-01-01 RX ADMIN — SODIUM CHLORIDE 100 MILLILITER(S): 9 INJECTION, SOLUTION INTRAVENOUS at 19:50

## 2021-01-01 RX ADMIN — Medication 250 MILLIGRAM(S): at 01:33

## 2021-01-01 RX ADMIN — Medication 4: at 02:32

## 2021-01-01 RX ADMIN — CILOSTAZOL 100 MILLIGRAM(S): 100 TABLET ORAL at 16:28

## 2021-01-01 RX ADMIN — HEPARIN SODIUM 1200 UNIT(S)/HR: 5000 INJECTION INTRAVENOUS; SUBCUTANEOUS at 16:44

## 2021-01-01 RX ADMIN — INSULIN GLARGINE 5 UNIT(S): 100 INJECTION, SOLUTION SUBCUTANEOUS at 23:38

## 2021-01-01 RX ADMIN — RANOLAZINE 500 MILLIGRAM(S): 500 TABLET, FILM COATED, EXTENDED RELEASE ORAL at 05:58

## 2021-01-01 RX ADMIN — Medication 4: at 16:55

## 2021-01-01 RX ADMIN — OXYCODONE AND ACETAMINOPHEN 1 TABLET(S): 5; 325 TABLET ORAL at 11:51

## 2021-01-01 RX ADMIN — Medication 250 MILLIGRAM(S): at 16:26

## 2021-01-01 RX ADMIN — Medication 88 MICROGRAM(S): at 05:43

## 2021-01-01 RX ADMIN — CILOSTAZOL 100 MILLIGRAM(S): 100 TABLET ORAL at 05:52

## 2021-01-01 RX ADMIN — ATORVASTATIN CALCIUM 80 MILLIGRAM(S): 80 TABLET, FILM COATED ORAL at 22:26

## 2021-01-01 RX ADMIN — Medication 125 MILLILITER(S): at 00:13

## 2021-01-01 RX ADMIN — Medication 325 MILLIGRAM(S): at 11:08

## 2021-01-01 RX ADMIN — SODIUM CHLORIDE 500 MILLILITER(S): 9 INJECTION INTRAMUSCULAR; INTRAVENOUS; SUBCUTANEOUS at 18:41

## 2021-01-01 RX ADMIN — GABAPENTIN 100 MILLIGRAM(S): 400 CAPSULE ORAL at 23:23

## 2021-01-01 RX ADMIN — LISINOPRIL 40 MILLIGRAM(S): 2.5 TABLET ORAL at 05:43

## 2021-01-01 RX ADMIN — LISINOPRIL 40 MILLIGRAM(S): 2.5 TABLET ORAL at 05:50

## 2021-01-01 RX ADMIN — CILOSTAZOL 100 MILLIGRAM(S): 100 TABLET ORAL at 21:19

## 2021-01-01 RX ADMIN — Medication 88 MICROGRAM(S): at 07:04

## 2021-01-01 RX ADMIN — LISINOPRIL 40 MILLIGRAM(S): 2.5 TABLET ORAL at 05:52

## 2021-01-01 RX ADMIN — TAMSULOSIN HYDROCHLORIDE 0.4 MILLIGRAM(S): 0.4 CAPSULE ORAL at 21:38

## 2021-01-01 RX ADMIN — Medication 145 MILLIGRAM(S): at 15:18

## 2021-01-01 RX ADMIN — Medication 8: at 15:59

## 2021-01-01 RX ADMIN — ENOXAPARIN SODIUM 40 MILLIGRAM(S): 100 INJECTION SUBCUTANEOUS at 12:54

## 2021-01-01 RX ADMIN — OXYCODONE HYDROCHLORIDE 10 MILLIGRAM(S): 5 TABLET ORAL at 08:31

## 2021-01-01 RX ADMIN — Medication 100 MILLIGRAM(S): at 21:37

## 2021-01-01 RX ADMIN — OXYCODONE HYDROCHLORIDE 10 MILLIGRAM(S): 5 TABLET ORAL at 18:15

## 2021-01-01 RX ADMIN — INSULIN GLARGINE 15 UNIT(S): 100 INJECTION, SOLUTION SUBCUTANEOUS at 21:16

## 2021-01-01 RX ADMIN — CHLORHEXIDINE GLUCONATE 1 APPLICATION(S): 213 SOLUTION TOPICAL at 06:02

## 2021-01-01 RX ADMIN — Medication 100 MILLIGRAM(S): at 22:25

## 2021-01-01 RX ADMIN — Medication 400 MILLIGRAM(S): at 11:20

## 2021-01-01 RX ADMIN — Medication 650 MILLIGRAM(S): at 08:11

## 2021-01-01 RX ADMIN — PHENYLEPHRINE HYDROCHLORIDE 33.2 MICROGRAM(S)/KG/MIN: 10 INJECTION INTRAVENOUS at 19:01

## 2021-01-01 RX ADMIN — TAMSULOSIN HYDROCHLORIDE 0.4 MILLIGRAM(S): 0.4 CAPSULE ORAL at 21:37

## 2021-01-01 RX ADMIN — INSULIN GLARGINE 32 UNIT(S): 100 INJECTION, SOLUTION SUBCUTANEOUS at 21:47

## 2021-01-01 RX ADMIN — Medication 50 MILLIGRAM(S): at 11:19

## 2021-01-01 RX ADMIN — Medication 25 MILLIGRAM(S): at 05:54

## 2021-01-01 RX ADMIN — Medication 4: at 11:26

## 2021-01-01 RX ADMIN — DEXMEDETOMIDINE HYDROCHLORIDE IN 0.9% SODIUM CHLORIDE 7.24 MICROGRAM(S)/KG/HR: 4 INJECTION INTRAVENOUS at 23:30

## 2021-01-01 RX ADMIN — Medication 500 MICROGRAM(S): at 03:56

## 2021-01-01 RX ADMIN — CILOSTAZOL 100 MILLIGRAM(S): 100 TABLET ORAL at 04:37

## 2021-01-01 RX ADMIN — Medication 325 MILLIGRAM(S): at 11:10

## 2021-01-01 RX ADMIN — CHLORHEXIDINE GLUCONATE 15 MILLILITER(S): 213 SOLUTION TOPICAL at 06:02

## 2021-01-01 RX ADMIN — Medication 100 MILLIGRAM(S): at 17:43

## 2021-01-01 RX ADMIN — HEPARIN SODIUM 1400 UNIT(S)/HR: 5000 INJECTION INTRAVENOUS; SUBCUTANEOUS at 02:30

## 2021-01-01 RX ADMIN — Medication 2: at 17:12

## 2021-01-01 RX ADMIN — Medication 5 MILLIGRAM(S): at 00:22

## 2021-01-01 RX ADMIN — Medication 6: at 16:06

## 2021-01-01 RX ADMIN — INSULIN GLARGINE 15 UNIT(S): 100 INJECTION, SOLUTION SUBCUTANEOUS at 03:28

## 2021-01-01 RX ADMIN — SENNA PLUS 2 TABLET(S): 8.6 TABLET ORAL at 21:37

## 2021-01-01 RX ADMIN — Medication 40 MILLIGRAM(S): at 08:47

## 2021-01-01 RX ADMIN — ATORVASTATIN CALCIUM 80 MILLIGRAM(S): 80 TABLET, FILM COATED ORAL at 21:01

## 2021-01-01 RX ADMIN — ENOXAPARIN SODIUM 40 MILLIGRAM(S): 100 INJECTION SUBCUTANEOUS at 11:26

## 2021-01-01 RX ADMIN — OXYCODONE HYDROCHLORIDE 5 MILLIGRAM(S): 5 TABLET ORAL at 21:51

## 2021-01-01 RX ADMIN — PIPERACILLIN AND TAZOBACTAM 200 GRAM(S): 4; .5 INJECTION, POWDER, LYOPHILIZED, FOR SOLUTION INTRAVENOUS at 22:51

## 2021-01-01 RX ADMIN — INSULIN GLARGINE 15 UNIT(S): 100 INJECTION, SOLUTION SUBCUTANEOUS at 21:46

## 2021-01-01 RX ADMIN — SODIUM CHLORIDE 500 MILLILITER(S): 9 INJECTION, SOLUTION INTRAVENOUS at 05:54

## 2021-01-01 RX ADMIN — Medication 100 MILLIGRAM(S): at 05:02

## 2021-01-01 RX ADMIN — CILOSTAZOL 100 MILLIGRAM(S): 100 TABLET ORAL at 04:57

## 2021-01-01 RX ADMIN — Medication 5 MILLIGRAM(S): at 11:45

## 2021-01-01 RX ADMIN — RANOLAZINE 500 MILLIGRAM(S): 500 TABLET, FILM COATED, EXTENDED RELEASE ORAL at 17:43

## 2021-01-01 RX ADMIN — OXYCODONE HYDROCHLORIDE 10 MILLIGRAM(S): 5 TABLET ORAL at 03:47

## 2021-01-01 RX ADMIN — Medication 325 MILLIGRAM(S): at 14:35

## 2021-01-01 RX ADMIN — LISINOPRIL 40 MILLIGRAM(S): 2.5 TABLET ORAL at 05:58

## 2021-01-01 RX ADMIN — ENOXAPARIN SODIUM 40 MILLIGRAM(S): 100 INJECTION SUBCUTANEOUS at 10:27

## 2021-01-01 RX ADMIN — INSULIN HUMAN 5 UNIT(S)/HR: 100 INJECTION, SOLUTION SUBCUTANEOUS at 21:12

## 2021-01-01 RX ADMIN — HYDROMORPHONE HYDROCHLORIDE 0.5 MILLIGRAM(S): 2 INJECTION INTRAMUSCULAR; INTRAVENOUS; SUBCUTANEOUS at 05:55

## 2021-01-01 RX ADMIN — Medication 1 TABLET(S): at 18:41

## 2021-01-01 RX ADMIN — Medication 2: at 17:50

## 2021-01-01 RX ADMIN — DEXMEDETOMIDINE HYDROCHLORIDE IN 0.9% SODIUM CHLORIDE 7.24 MICROGRAM(S)/KG/HR: 4 INJECTION INTRAVENOUS at 17:33

## 2021-01-01 RX ADMIN — LEVALBUTEROL 1.25 MILLIGRAM(S): 1.25 SOLUTION, CONCENTRATE RESPIRATORY (INHALATION) at 20:25

## 2021-01-01 RX ADMIN — Medication 6: at 22:05

## 2021-01-01 RX ADMIN — OXYCODONE HYDROCHLORIDE 10 MILLIGRAM(S): 5 TABLET ORAL at 15:29

## 2021-01-01 RX ADMIN — OXYCODONE AND ACETAMINOPHEN 1 TABLET(S): 5; 325 TABLET ORAL at 12:43

## 2021-01-01 RX ADMIN — Medication 6: at 11:08

## 2021-01-01 RX ADMIN — POLYETHYLENE GLYCOL 3350 17 GRAM(S): 17 POWDER, FOR SOLUTION ORAL at 11:08

## 2021-01-01 RX ADMIN — SODIUM CHLORIDE 1000 MILLILITER(S): 9 INJECTION, SOLUTION INTRAVENOUS at 00:17

## 2021-01-01 RX ADMIN — PIPERACILLIN AND TAZOBACTAM 25 GRAM(S): 4; .5 INJECTION, POWDER, LYOPHILIZED, FOR SOLUTION INTRAVENOUS at 22:21

## 2021-01-01 RX ADMIN — Medication 88 MICROGRAM(S): at 05:50

## 2021-01-01 RX ADMIN — LISINOPRIL 40 MILLIGRAM(S): 2.5 TABLET ORAL at 05:44

## 2021-01-01 RX ADMIN — LISINOPRIL 40 MILLIGRAM(S): 2.5 TABLET ORAL at 05:03

## 2021-01-01 RX ADMIN — Medication 9.05 MICROGRAM(S)/KG/MIN: at 12:21

## 2021-01-01 RX ADMIN — OXYCODONE HYDROCHLORIDE 10 MILLIGRAM(S): 5 TABLET ORAL at 20:52

## 2021-01-01 RX ADMIN — Medication 3 MILLIGRAM(S): at 21:46

## 2021-01-01 RX ADMIN — Medication 25 MILLIGRAM(S): at 17:03

## 2021-01-01 RX ADMIN — CILOSTAZOL 100 MILLIGRAM(S): 100 TABLET ORAL at 12:40

## 2021-01-01 RX ADMIN — ATORVASTATIN CALCIUM 80 MILLIGRAM(S): 80 TABLET, FILM COATED ORAL at 21:37

## 2021-01-01 RX ADMIN — Medication 2: at 16:29

## 2021-01-01 RX ADMIN — Medication 145 MILLIGRAM(S): at 11:32

## 2021-01-01 RX ADMIN — HEPARIN SODIUM 1500 UNIT(S)/HR: 5000 INJECTION INTRAVENOUS; SUBCUTANEOUS at 10:50

## 2021-01-01 RX ADMIN — Medication 200 MILLIGRAM(S): at 05:52

## 2021-01-01 RX ADMIN — Medication 250 MILLIGRAM(S): at 05:43

## 2021-01-01 RX ADMIN — HEPARIN SODIUM 1500 UNIT(S)/HR: 5000 INJECTION INTRAVENOUS; SUBCUTANEOUS at 06:56

## 2021-01-01 RX ADMIN — OXYCODONE HYDROCHLORIDE 10 MILLIGRAM(S): 5 TABLET ORAL at 03:07

## 2021-01-01 RX ADMIN — CHLORHEXIDINE GLUCONATE 15 MILLILITER(S): 213 SOLUTION TOPICAL at 18:15

## 2021-01-01 RX ADMIN — Medication 100 MILLIGRAM(S): at 09:46

## 2021-01-01 RX ADMIN — CHLORHEXIDINE GLUCONATE 15 MILLILITER(S): 213 SOLUTION TOPICAL at 17:37

## 2021-01-01 RX ADMIN — Medication 6 UNIT(S): at 11:24

## 2021-01-01 RX ADMIN — Medication 2: at 16:43

## 2021-01-01 RX ADMIN — Medication 325 MILLIGRAM(S): at 12:54

## 2021-01-01 RX ADMIN — DEXMEDETOMIDINE HYDROCHLORIDE IN 0.9% SODIUM CHLORIDE 4.83 MICROGRAM(S)/KG/HR: 4 INJECTION INTRAVENOUS at 00:54

## 2021-01-01 RX ADMIN — CILOSTAZOL 100 MILLIGRAM(S): 100 TABLET ORAL at 00:54

## 2021-01-01 RX ADMIN — SENNA PLUS 2 TABLET(S): 8.6 TABLET ORAL at 20:49

## 2021-01-01 RX ADMIN — LEVALBUTEROL 1.25 MILLIGRAM(S): 1.25 SOLUTION, CONCENTRATE RESPIRATORY (INHALATION) at 03:56

## 2021-01-01 RX ADMIN — SODIUM CHLORIDE 75 MILLILITER(S): 9 INJECTION, SOLUTION INTRAVENOUS at 08:48

## 2021-01-01 RX ADMIN — RANOLAZINE 500 MILLIGRAM(S): 500 TABLET, FILM COATED, EXTENDED RELEASE ORAL at 05:52

## 2021-01-01 RX ADMIN — Medication 2: at 12:22

## 2021-01-01 RX ADMIN — Medication 4: at 08:43

## 2021-01-01 RX ADMIN — Medication 250 MILLIGRAM(S): at 05:27

## 2021-01-01 RX ADMIN — HYDROMORPHONE HYDROCHLORIDE 0.5 MILLIGRAM(S): 2 INJECTION INTRAMUSCULAR; INTRAVENOUS; SUBCUTANEOUS at 21:37

## 2021-01-01 RX ADMIN — HEPARIN SODIUM 1500 UNIT(S)/HR: 5000 INJECTION INTRAVENOUS; SUBCUTANEOUS at 13:25

## 2021-01-01 RX ADMIN — PIPERACILLIN AND TAZOBACTAM 25 GRAM(S): 4; .5 INJECTION, POWDER, LYOPHILIZED, FOR SOLUTION INTRAVENOUS at 04:58

## 2021-01-01 RX ADMIN — Medication 88 MICROGRAM(S): at 05:02

## 2021-01-01 RX ADMIN — Medication 4: at 12:57

## 2021-01-01 RX ADMIN — HEPARIN SODIUM 1400 UNIT(S)/HR: 5000 INJECTION INTRAVENOUS; SUBCUTANEOUS at 22:37

## 2021-01-01 RX ADMIN — Medication 25 MILLIGRAM(S): at 14:35

## 2021-01-01 RX ADMIN — ENOXAPARIN SODIUM 40 MILLIGRAM(S): 100 INJECTION SUBCUTANEOUS at 11:52

## 2021-01-01 RX ADMIN — OXYCODONE AND ACETAMINOPHEN 1 TABLET(S): 5; 325 TABLET ORAL at 05:45

## 2021-01-01 RX ADMIN — Medication 44 MICROGRAM(S): at 23:21

## 2021-01-01 RX ADMIN — HEPARIN SODIUM 8000 UNIT(S): 5000 INJECTION INTRAVENOUS; SUBCUTANEOUS at 06:04

## 2021-01-01 RX ADMIN — CHLORHEXIDINE GLUCONATE 1 APPLICATION(S): 213 SOLUTION TOPICAL at 05:44

## 2021-01-01 RX ADMIN — Medication 975 MILLIGRAM(S): at 09:04

## 2021-01-01 RX ADMIN — Medication 145 MILLIGRAM(S): at 12:23

## 2021-01-01 RX ADMIN — Medication 50 GRAM(S): at 06:21

## 2021-01-01 RX ADMIN — Medication 250 MILLIGRAM(S): at 02:46

## 2021-01-01 RX ADMIN — OXYCODONE AND ACETAMINOPHEN 1 TABLET(S): 5; 325 TABLET ORAL at 06:40

## 2021-01-01 RX ADMIN — ATORVASTATIN CALCIUM 80 MILLIGRAM(S): 80 TABLET, FILM COATED ORAL at 21:38

## 2021-01-01 RX ADMIN — FENTANYL CITRATE 4.83 MICROGRAM(S)/KG/HR: 50 INJECTION INTRAVENOUS at 16:53

## 2021-01-01 RX ADMIN — LEVALBUTEROL 1.25 MILLIGRAM(S): 1.25 SOLUTION, CONCENTRATE RESPIRATORY (INHALATION) at 04:15

## 2021-01-01 RX ADMIN — PROPOFOL 100 MILLIGRAM(S): 10 INJECTION, EMULSION INTRAVENOUS at 12:00

## 2021-01-01 RX ADMIN — Medication 500 MICROGRAM(S): at 08:22

## 2021-01-01 RX ADMIN — ATORVASTATIN CALCIUM 80 MILLIGRAM(S): 80 TABLET, FILM COATED ORAL at 20:48

## 2021-01-01 RX ADMIN — PIPERACILLIN AND TAZOBACTAM 25 GRAM(S): 4; .5 INJECTION, POWDER, LYOPHILIZED, FOR SOLUTION INTRAVENOUS at 21:46

## 2021-01-01 RX ADMIN — RANOLAZINE 500 MILLIGRAM(S): 500 TABLET, FILM COATED, EXTENDED RELEASE ORAL at 17:53

## 2021-01-01 RX ADMIN — OXYCODONE AND ACETAMINOPHEN 1 TABLET(S): 5; 325 TABLET ORAL at 21:26

## 2021-01-01 RX ADMIN — Medication 250 MILLIGRAM(S): at 05:52

## 2021-01-01 RX ADMIN — HEPARIN SODIUM 1400 UNIT(S)/HR: 5000 INJECTION INTRAVENOUS; SUBCUTANEOUS at 08:51

## 2021-01-01 RX ADMIN — Medication 3 MILLIGRAM(S): at 22:48

## 2021-01-01 RX ADMIN — Medication 100 MILLIGRAM(S): at 09:56

## 2021-01-01 RX ADMIN — HYDROMORPHONE HYDROCHLORIDE 0.5 MILLIGRAM(S): 2 INJECTION INTRAMUSCULAR; INTRAVENOUS; SUBCUTANEOUS at 18:39

## 2021-01-01 RX ADMIN — OXYCODONE HYDROCHLORIDE 5 MILLIGRAM(S): 5 TABLET ORAL at 10:27

## 2021-01-01 RX ADMIN — Medication 88 MICROGRAM(S): at 05:59

## 2021-01-01 RX ADMIN — Medication 2: at 09:28

## 2021-01-01 RX ADMIN — Medication 88 MICROGRAM(S): at 05:24

## 2021-01-01 RX ADMIN — OXYCODONE HYDROCHLORIDE 10 MILLIGRAM(S): 5 TABLET ORAL at 20:13

## 2021-01-01 RX ADMIN — Medication 88 MICROGRAM(S): at 05:45

## 2021-01-01 RX ADMIN — Medication 8: at 23:40

## 2021-01-01 RX ADMIN — Medication 145 MILLIGRAM(S): at 11:10

## 2021-01-01 RX ADMIN — Medication 2: at 18:25

## 2021-01-01 RX ADMIN — Medication 325 MILLIGRAM(S): at 12:48

## 2021-01-01 RX ADMIN — Medication 5 MILLIGRAM(S): at 06:07

## 2021-01-01 RX ADMIN — INSULIN GLARGINE 15 UNIT(S): 100 INJECTION, SOLUTION SUBCUTANEOUS at 21:26

## 2021-01-01 RX ADMIN — Medication 100 MILLIGRAM(S): at 06:40

## 2021-01-01 RX ADMIN — HEPARIN SODIUM 4000 UNIT(S): 5000 INJECTION INTRAVENOUS; SUBCUTANEOUS at 02:33

## 2021-01-01 RX ADMIN — Medication 500 MICROGRAM(S): at 20:44

## 2021-01-01 RX ADMIN — Medication 100 MILLIGRAM(S): at 08:03

## 2021-01-01 RX ADMIN — POLYETHYLENE GLYCOL 3350 17 GRAM(S): 17 POWDER, FOR SOLUTION ORAL at 22:48

## 2021-01-01 RX ADMIN — HEPARIN SODIUM 1300 UNIT(S)/HR: 5000 INJECTION INTRAVENOUS; SUBCUTANEOUS at 20:56

## 2021-01-01 RX ADMIN — Medication 6: at 16:43

## 2021-01-01 RX ADMIN — HYDROMORPHONE HYDROCHLORIDE 0.5 MILLIGRAM(S): 2 INJECTION INTRAMUSCULAR; INTRAVENOUS; SUBCUTANEOUS at 16:01

## 2021-01-01 RX ADMIN — OXYCODONE AND ACETAMINOPHEN 1 TABLET(S): 5; 325 TABLET ORAL at 23:20

## 2021-01-01 RX ADMIN — Medication 200 MILLIGRAM(S): at 05:28

## 2021-01-01 RX ADMIN — Medication 25 MILLIGRAM(S): at 11:10

## 2021-01-01 RX ADMIN — PIPERACILLIN AND TAZOBACTAM 25 GRAM(S): 4; .5 INJECTION, POWDER, LYOPHILIZED, FOR SOLUTION INTRAVENOUS at 16:26

## 2021-01-01 RX ADMIN — Medication 6: at 11:34

## 2021-01-01 RX ADMIN — CILOSTAZOL 100 MILLIGRAM(S): 100 TABLET ORAL at 16:26

## 2021-01-01 RX ADMIN — CILOSTAZOL 100 MILLIGRAM(S): 100 TABLET ORAL at 22:02

## 2021-01-01 RX ADMIN — POLYETHYLENE GLYCOL 3350 17 GRAM(S): 17 POWDER, FOR SOLUTION ORAL at 11:32

## 2021-01-01 RX ADMIN — ATORVASTATIN CALCIUM 80 MILLIGRAM(S): 80 TABLET, FILM COATED ORAL at 21:23

## 2021-01-01 RX ADMIN — CHLORHEXIDINE GLUCONATE 1 APPLICATION(S): 213 SOLUTION TOPICAL at 05:02

## 2021-01-01 RX ADMIN — Medication 25 MILLIGRAM(S): at 00:36

## 2021-01-01 RX ADMIN — Medication 4 UNIT(S): at 16:57

## 2021-01-01 RX ADMIN — TAMSULOSIN HYDROCHLORIDE 0.4 MILLIGRAM(S): 0.4 CAPSULE ORAL at 22:26

## 2021-01-01 RX ADMIN — CILOSTAZOL 100 MILLIGRAM(S): 100 TABLET ORAL at 10:28

## 2021-01-01 RX ADMIN — Medication 2.5 MILLIGRAM(S): at 09:46

## 2021-01-01 RX ADMIN — CILOSTAZOL 100 MILLIGRAM(S): 100 TABLET ORAL at 08:19

## 2021-01-01 RX ADMIN — CILOSTAZOL 100 MILLIGRAM(S): 100 TABLET ORAL at 05:43

## 2021-01-01 RX ADMIN — OXYCODONE AND ACETAMINOPHEN 1 TABLET(S): 5; 325 TABLET ORAL at 12:29

## 2021-01-01 RX ADMIN — Medication 100 MILLIGRAM(S): at 05:50

## 2021-01-01 RX ADMIN — Medication 145 MILLIGRAM(S): at 11:52

## 2021-01-01 RX ADMIN — Medication 650 MILLIGRAM(S): at 17:12

## 2021-01-01 RX ADMIN — SODIUM CHLORIDE 100 MILLILITER(S): 9 INJECTION, SOLUTION INTRAVENOUS at 10:43

## 2021-01-01 RX ADMIN — INSULIN GLARGINE 15 UNIT(S): 100 INJECTION, SOLUTION SUBCUTANEOUS at 21:47

## 2021-01-01 RX ADMIN — Medication 325 MILLIGRAM(S): at 11:31

## 2021-01-01 RX ADMIN — Medication 4: at 16:59

## 2021-01-01 RX ADMIN — GABAPENTIN 100 MILLIGRAM(S): 400 CAPSULE ORAL at 17:52

## 2021-01-01 RX ADMIN — Medication 4 MILLIGRAM(S): at 15:20

## 2021-01-01 RX ADMIN — DEXMEDETOMIDINE HYDROCHLORIDE IN 0.9% SODIUM CHLORIDE 22.1 MICROGRAM(S)/KG/HR: 4 INJECTION INTRAVENOUS at 16:09

## 2021-01-01 RX ADMIN — Medication 145 MILLIGRAM(S): at 11:37

## 2021-01-01 RX ADMIN — Medication 100 MILLIGRAM(S): at 12:50

## 2021-01-01 RX ADMIN — Medication 2: at 12:27

## 2021-01-01 RX ADMIN — Medication 200 MILLIGRAM(S): at 04:57

## 2021-01-01 RX ADMIN — OXYCODONE HYDROCHLORIDE 10 MILLIGRAM(S): 5 TABLET ORAL at 02:31

## 2021-01-01 RX ADMIN — RANOLAZINE 500 MILLIGRAM(S): 500 TABLET, FILM COATED, EXTENDED RELEASE ORAL at 06:06

## 2021-01-01 RX ADMIN — Medication 4: at 11:24

## 2021-01-01 RX ADMIN — GABAPENTIN 100 MILLIGRAM(S): 400 CAPSULE ORAL at 04:37

## 2021-01-01 RX ADMIN — ATORVASTATIN CALCIUM 80 MILLIGRAM(S): 80 TABLET, FILM COATED ORAL at 21:26

## 2021-01-01 RX ADMIN — Medication 400 MILLIGRAM(S): at 17:48

## 2021-01-01 RX ADMIN — CHLORHEXIDINE GLUCONATE 1 APPLICATION(S): 213 SOLUTION TOPICAL at 05:43

## 2021-01-01 RX ADMIN — FENTANYL CITRATE 50 MICROGRAM(S): 50 INJECTION INTRAVENOUS at 06:51

## 2021-01-01 RX ADMIN — Medication 200 MILLIGRAM(S): at 05:24

## 2021-01-01 RX ADMIN — PIPERACILLIN AND TAZOBACTAM 25 GRAM(S): 4; .5 INJECTION, POWDER, LYOPHILIZED, FOR SOLUTION INTRAVENOUS at 12:45

## 2021-01-01 RX ADMIN — CHLORHEXIDINE GLUCONATE 15 MILLILITER(S): 213 SOLUTION TOPICAL at 17:33

## 2021-01-01 RX ADMIN — Medication 100 MILLIGRAM(S): at 05:43

## 2021-01-01 RX ADMIN — Medication 4: at 14:32

## 2021-01-01 RX ADMIN — Medication 2: at 08:43

## 2021-01-01 RX ADMIN — RANOLAZINE 500 MILLIGRAM(S): 500 TABLET, FILM COATED, EXTENDED RELEASE ORAL at 16:43

## 2021-01-01 RX ADMIN — INSULIN GLARGINE 30 UNIT(S): 100 INJECTION, SOLUTION SUBCUTANEOUS at 00:22

## 2021-01-01 RX ADMIN — Medication 125 MILLILITER(S): at 02:54

## 2021-01-01 RX ADMIN — Medication 4: at 12:22

## 2021-01-01 RX ADMIN — Medication 145 MILLIGRAM(S): at 12:06

## 2021-01-01 RX ADMIN — Medication 650 MILLIGRAM(S): at 06:35

## 2021-01-01 RX ADMIN — Medication 88 MICROGRAM(S): at 05:40

## 2021-01-01 RX ADMIN — VASOPRESSIN 2.4 UNIT(S)/MIN: 20 INJECTION INTRAVENOUS at 02:47

## 2021-01-01 RX ADMIN — RANOLAZINE 500 MILLIGRAM(S): 500 TABLET, FILM COATED, EXTENDED RELEASE ORAL at 04:37

## 2021-01-01 RX ADMIN — RANOLAZINE 500 MILLIGRAM(S): 500 TABLET, FILM COATED, EXTENDED RELEASE ORAL at 05:03

## 2021-01-01 RX ADMIN — FENTANYL CITRATE 25 MICROGRAM(S): 50 INJECTION INTRAVENOUS at 18:25

## 2021-01-01 RX ADMIN — Medication 145 MILLIGRAM(S): at 11:31

## 2021-01-01 RX ADMIN — Medication 145 MILLIGRAM(S): at 11:24

## 2021-01-01 RX ADMIN — Medication 250 MILLIGRAM(S): at 11:34

## 2021-01-01 RX ADMIN — Medication 9.05 MICROGRAM(S)/KG/MIN: at 01:00

## 2021-01-01 RX ADMIN — Medication 325 MILLIGRAM(S): at 10:28

## 2021-01-01 RX ADMIN — Medication 4: at 16:52

## 2021-01-01 RX ADMIN — LEVALBUTEROL 1.25 MILLIGRAM(S): 1.25 SOLUTION, CONCENTRATE RESPIRATORY (INHALATION) at 20:44

## 2021-01-01 RX ADMIN — PIPERACILLIN AND TAZOBACTAM 25 GRAM(S): 4; .5 INJECTION, POWDER, LYOPHILIZED, FOR SOLUTION INTRAVENOUS at 21:16

## 2021-01-01 RX ADMIN — CILOSTAZOL 100 MILLIGRAM(S): 100 TABLET ORAL at 17:14

## 2021-01-01 RX ADMIN — Medication 3 MILLILITER(S): at 22:36

## 2021-01-01 RX ADMIN — Medication 650 MILLIGRAM(S): at 22:51

## 2021-01-01 RX ADMIN — Medication 250 MILLIGRAM(S): at 16:44

## 2021-01-01 RX ADMIN — Medication 250 MILLIGRAM(S): at 17:33

## 2021-01-01 RX ADMIN — Medication 325 MILLIGRAM(S): at 11:33

## 2021-01-01 RX ADMIN — Medication 100 MILLIGRAM(S): at 12:24

## 2021-01-01 RX ADMIN — Medication 250 MILLIGRAM(S): at 16:28

## 2021-01-01 RX ADMIN — Medication 88 MICROGRAM(S): at 06:07

## 2021-01-01 RX ADMIN — INSULIN GLARGINE 32 UNIT(S): 100 INJECTION, SOLUTION SUBCUTANEOUS at 22:28

## 2021-01-01 RX ADMIN — OXYCODONE HYDROCHLORIDE 5 MILLIGRAM(S): 5 TABLET ORAL at 17:52

## 2021-01-01 RX ADMIN — CHLORHEXIDINE GLUCONATE 1 APPLICATION(S): 213 SOLUTION TOPICAL at 05:49

## 2021-01-01 RX ADMIN — Medication 650 MILLIGRAM(S): at 08:58

## 2021-01-01 RX ADMIN — Medication 100 MILLIGRAM(S): at 14:30

## 2021-01-01 RX ADMIN — ROBINUL 0.4 MILLIGRAM(S): 0.2 INJECTION INTRAMUSCULAR; INTRAVENOUS at 15:20

## 2021-01-01 RX ADMIN — CILOSTAZOL 100 MILLIGRAM(S): 100 TABLET ORAL at 05:04

## 2021-01-01 RX ADMIN — Medication 100 MILLIGRAM(S): at 13:15

## 2021-01-01 RX ADMIN — Medication 500 MICROGRAM(S): at 04:15

## 2021-01-01 RX ADMIN — ATORVASTATIN CALCIUM 80 MILLIGRAM(S): 80 TABLET, FILM COATED ORAL at 21:41

## 2021-01-01 RX ADMIN — OXYCODONE AND ACETAMINOPHEN 1 TABLET(S): 5; 325 TABLET ORAL at 21:40

## 2021-01-01 RX ADMIN — HEPARIN SODIUM 1300 UNIT(S)/HR: 5000 INJECTION INTRAVENOUS; SUBCUTANEOUS at 09:42

## 2021-01-01 RX ADMIN — INSULIN GLARGINE 20 UNIT(S): 100 INJECTION, SOLUTION SUBCUTANEOUS at 21:39

## 2021-01-01 RX ADMIN — HEPARIN SODIUM 0 UNIT(S)/HR: 5000 INJECTION INTRAVENOUS; SUBCUTANEOUS at 12:25

## 2021-01-01 RX ADMIN — CHLORHEXIDINE GLUCONATE 1 APPLICATION(S): 213 SOLUTION TOPICAL at 06:40

## 2021-01-01 RX ADMIN — ATORVASTATIN CALCIUM 80 MILLIGRAM(S): 80 TABLET, FILM COATED ORAL at 22:02

## 2021-01-01 RX ADMIN — PROPOFOL 5.79 MICROGRAM(S)/KG/MIN: 10 INJECTION, EMULSION INTRAVENOUS at 05:05

## 2021-01-01 RX ADMIN — PIPERACILLIN AND TAZOBACTAM 25 GRAM(S): 4; .5 INJECTION, POWDER, LYOPHILIZED, FOR SOLUTION INTRAVENOUS at 13:01

## 2021-01-01 RX ADMIN — Medication 100 MILLIGRAM(S): at 05:51

## 2021-01-01 RX ADMIN — ATORVASTATIN CALCIUM 80 MILLIGRAM(S): 80 TABLET, FILM COATED ORAL at 21:19

## 2021-01-01 RX ADMIN — HEPARIN SODIUM 4 UNIT(S)/HR: 5000 INJECTION INTRAVENOUS; SUBCUTANEOUS at 01:30

## 2021-01-01 RX ADMIN — CHLORHEXIDINE GLUCONATE 15 MILLILITER(S): 213 SOLUTION TOPICAL at 05:50

## 2021-01-01 RX ADMIN — ENOXAPARIN SODIUM 40 MILLIGRAM(S): 100 INJECTION SUBCUTANEOUS at 11:56

## 2021-01-01 RX ADMIN — Medication 975 MILLIGRAM(S): at 12:05

## 2021-01-01 RX ADMIN — Medication 3 MILLILITER(S): at 11:35

## 2021-01-01 RX ADMIN — HEPARIN SODIUM 12 UNIT(S)/HR: 5000 INJECTION INTRAVENOUS; SUBCUTANEOUS at 23:30

## 2021-01-01 RX ADMIN — Medication 145 MILLIGRAM(S): at 11:56

## 2021-01-01 RX ADMIN — OXYCODONE HYDROCHLORIDE 10 MILLIGRAM(S): 5 TABLET ORAL at 12:05

## 2021-01-01 RX ADMIN — Medication 50 MILLIGRAM(S): at 22:26

## 2021-01-01 RX ADMIN — Medication 4: at 06:08

## 2021-01-01 RX ADMIN — PROPOFOL 5.79 MICROGRAM(S)/KG/MIN: 10 INJECTION, EMULSION INTRAVENOUS at 00:03

## 2021-01-01 RX ADMIN — Medication 2: at 12:45

## 2021-01-01 RX ADMIN — FENTANYL CITRATE 50 MICROGRAM(S): 50 INJECTION INTRAVENOUS at 12:00

## 2021-01-01 RX ADMIN — HYDROMORPHONE HYDROCHLORIDE 0.5 MILLIGRAM(S): 2 INJECTION INTRAMUSCULAR; INTRAVENOUS; SUBCUTANEOUS at 22:54

## 2021-01-01 RX ADMIN — Medication 100 MILLIGRAM(S): at 22:02

## 2021-01-01 RX ADMIN — Medication 250 MILLIGRAM(S): at 13:53

## 2021-01-01 RX ADMIN — PHENYLEPHRINE HYDROCHLORIDE 18.1 MICROGRAM(S)/KG/MIN: 10 INJECTION INTRAVENOUS at 02:48

## 2021-01-01 RX ADMIN — SODIUM CHLORIDE 1000 MILLILITER(S): 9 INJECTION, SOLUTION INTRAVENOUS at 14:16

## 2021-01-01 RX ADMIN — ENOXAPARIN SODIUM 40 MILLIGRAM(S): 100 INJECTION SUBCUTANEOUS at 11:33

## 2021-01-01 RX ADMIN — RANOLAZINE 500 MILLIGRAM(S): 500 TABLET, FILM COATED, EXTENDED RELEASE ORAL at 05:44

## 2021-01-01 RX ADMIN — LISINOPRIL 40 MILLIGRAM(S): 2.5 TABLET ORAL at 06:09

## 2021-01-01 RX ADMIN — Medication 2: at 08:02

## 2021-01-01 RX ADMIN — OXYCODONE AND ACETAMINOPHEN 1 TABLET(S): 5; 325 TABLET ORAL at 21:46

## 2021-01-01 RX ADMIN — OXYCODONE AND ACETAMINOPHEN 1 TABLET(S): 5; 325 TABLET ORAL at 23:34

## 2021-01-01 RX ADMIN — PIPERACILLIN AND TAZOBACTAM 25 GRAM(S): 4; .5 INJECTION, POWDER, LYOPHILIZED, FOR SOLUTION INTRAVENOUS at 21:41

## 2021-01-01 RX ADMIN — Medication 8.3 MICROGRAM(S)/KG/MIN: at 17:35

## 2021-01-01 RX ADMIN — CILOSTAZOL 100 MILLIGRAM(S): 100 TABLET ORAL at 17:53

## 2021-01-01 RX ADMIN — RANOLAZINE 500 MILLIGRAM(S): 500 TABLET, FILM COATED, EXTENDED RELEASE ORAL at 05:50

## 2021-01-01 RX ADMIN — POLYETHYLENE GLYCOL 3350 17 GRAM(S): 17 POWDER, FOR SOLUTION ORAL at 12:04

## 2021-01-01 RX ADMIN — Medication 2: at 11:53

## 2021-01-01 RX ADMIN — Medication 2: at 12:34

## 2021-01-01 RX ADMIN — SODIUM CHLORIDE 1000 MILLILITER(S): 9 INJECTION, SOLUTION INTRAVENOUS at 01:00

## 2021-01-01 RX ADMIN — Medication 6: at 08:09

## 2021-01-01 RX ADMIN — SENNA PLUS 2 TABLET(S): 8.6 TABLET ORAL at 22:47

## 2021-01-01 RX ADMIN — Medication 325 MILLIGRAM(S): at 11:52

## 2021-01-01 RX ADMIN — Medication 4: at 06:05

## 2021-01-01 RX ADMIN — Medication 325 MILLIGRAM(S): at 12:27

## 2021-01-01 RX ADMIN — Medication 100 MILLIGRAM(S): at 04:37

## 2021-01-01 RX ADMIN — Medication 3 MILLIGRAM(S): at 20:49

## 2021-01-01 RX ADMIN — Medication 100 MILLIGRAM(S): at 06:07

## 2021-01-01 RX ADMIN — HYDROMORPHONE HYDROCHLORIDE 0.5 MILLIGRAM(S): 2 INJECTION INTRAMUSCULAR; INTRAVENOUS; SUBCUTANEOUS at 23:53

## 2021-01-01 RX ADMIN — DEXMEDETOMIDINE HYDROCHLORIDE IN 0.9% SODIUM CHLORIDE 4.83 MICROGRAM(S)/KG/HR: 4 INJECTION INTRAVENOUS at 05:50

## 2021-01-01 RX ADMIN — Medication 200 MILLIGRAM(S): at 05:43

## 2021-01-01 RX ADMIN — Medication 40 MILLIGRAM(S): at 18:39

## 2021-01-01 RX ADMIN — Medication 250 MILLIGRAM(S): at 18:24

## 2021-01-01 RX ADMIN — Medication 145 MILLIGRAM(S): at 12:27

## 2021-01-01 RX ADMIN — Medication 88 MICROGRAM(S): at 05:44

## 2021-01-01 RX ADMIN — Medication 2: at 08:00

## 2021-01-01 RX ADMIN — Medication 120 MILLIGRAM(S): at 12:00

## 2021-01-01 RX ADMIN — Medication 88 MICROGRAM(S): at 05:37

## 2021-01-01 RX ADMIN — Medication 62.5 MILLIMOLE(S): at 08:38

## 2021-01-01 RX ADMIN — Medication 250 MILLIGRAM(S): at 05:40

## 2021-01-01 RX ADMIN — ATORVASTATIN CALCIUM 80 MILLIGRAM(S): 80 TABLET, FILM COATED ORAL at 22:21

## 2021-01-01 RX ADMIN — ENOXAPARIN SODIUM 40 MILLIGRAM(S): 100 INJECTION SUBCUTANEOUS at 12:27

## 2021-01-01 RX ADMIN — Medication 125 MILLIGRAM(S): at 11:50

## 2021-01-01 RX ADMIN — Medication 100 MILLIGRAM(S): at 21:19

## 2021-01-01 RX ADMIN — Medication 40 MILLIGRAM(S): at 11:40

## 2021-01-01 RX ADMIN — FENTANYL CITRATE 25 MICROGRAM(S): 50 INJECTION INTRAVENOUS at 18:20

## 2021-01-01 RX ADMIN — OXYCODONE HYDROCHLORIDE 5 MILLIGRAM(S): 5 TABLET ORAL at 12:49

## 2021-01-01 RX ADMIN — LISINOPRIL 40 MILLIGRAM(S): 2.5 TABLET ORAL at 05:40

## 2021-01-01 RX ADMIN — OXYCODONE AND ACETAMINOPHEN 1 TABLET(S): 5; 325 TABLET ORAL at 05:03

## 2021-01-01 RX ADMIN — Medication 100 MILLIGRAM(S): at 13:00

## 2021-01-01 RX ADMIN — Medication 100 MILLIGRAM(S): at 05:36

## 2021-01-01 RX ADMIN — HEPARIN SODIUM 1400 UNIT(S)/HR: 5000 INJECTION INTRAVENOUS; SUBCUTANEOUS at 18:00

## 2021-01-01 RX ADMIN — OXYCODONE HYDROCHLORIDE 10 MILLIGRAM(S): 5 TABLET ORAL at 05:01

## 2021-01-01 RX ADMIN — PIPERACILLIN AND TAZOBACTAM 25 GRAM(S): 4; .5 INJECTION, POWDER, LYOPHILIZED, FOR SOLUTION INTRAVENOUS at 05:03

## 2021-01-01 RX ADMIN — Medication 6 UNIT(S): at 08:43

## 2021-01-01 RX ADMIN — Medication 6: at 05:32

## 2021-01-01 RX ADMIN — MIDAZOLAM HYDROCHLORIDE 2 MILLIGRAM(S): 1 INJECTION, SOLUTION INTRAMUSCULAR; INTRAVENOUS at 12:00

## 2021-01-01 RX ADMIN — CHLORHEXIDINE GLUCONATE 1 APPLICATION(S): 213 SOLUTION TOPICAL at 16:53

## 2021-01-01 RX ADMIN — CILOSTAZOL 100 MILLIGRAM(S): 100 TABLET ORAL at 05:59

## 2021-01-01 RX ADMIN — ATORVASTATIN CALCIUM 80 MILLIGRAM(S): 80 TABLET, FILM COATED ORAL at 21:17

## 2021-01-01 RX ADMIN — Medication 975 MILLIGRAM(S): at 21:45

## 2021-01-01 RX ADMIN — HEPARIN SODIUM 1800 UNIT(S)/HR: 5000 INJECTION INTRAVENOUS; SUBCUTANEOUS at 06:00

## 2021-01-01 RX ADMIN — Medication 650 MILLIGRAM(S): at 11:56

## 2021-01-01 RX ADMIN — LEVALBUTEROL 1.25 MILLIGRAM(S): 1.25 SOLUTION, CONCENTRATE RESPIRATORY (INHALATION) at 13:42

## 2021-01-01 RX ADMIN — LISINOPRIL 40 MILLIGRAM(S): 2.5 TABLET ORAL at 05:37

## 2021-01-01 RX ADMIN — HYDROMORPHONE HYDROCHLORIDE 0.5 MILLIGRAM(S): 2 INJECTION INTRAMUSCULAR; INTRAVENOUS; SUBCUTANEOUS at 01:09

## 2021-01-01 RX ADMIN — Medication 5 MILLIGRAM(S): at 19:29

## 2021-01-01 RX ADMIN — RANOLAZINE 500 MILLIGRAM(S): 500 TABLET, FILM COATED, EXTENDED RELEASE ORAL at 22:02

## 2021-01-01 RX ADMIN — LISINOPRIL 40 MILLIGRAM(S): 2.5 TABLET ORAL at 05:28

## 2021-01-01 RX ADMIN — OXYCODONE AND ACETAMINOPHEN 2 TABLET(S): 5; 325 TABLET ORAL at 10:36

## 2021-01-01 RX ADMIN — INSULIN GLARGINE 15 UNIT(S): 100 INJECTION, SOLUTION SUBCUTANEOUS at 21:35

## 2021-01-01 RX ADMIN — LISINOPRIL 40 MILLIGRAM(S): 2.5 TABLET ORAL at 05:24

## 2021-01-01 RX ADMIN — FENTANYL CITRATE 4.43 MICROGRAM(S)/KG/HR: 50 INJECTION INTRAVENOUS at 20:00

## 2021-01-01 RX ADMIN — Medication 100 MILLIGRAM(S): at 05:45

## 2021-01-01 RX ADMIN — CILOSTAZOL 100 MILLIGRAM(S): 100 TABLET ORAL at 17:33

## 2021-01-01 RX ADMIN — Medication 100 MILLIGRAM(S): at 21:24

## 2021-01-01 RX ADMIN — Medication 145 MILLIGRAM(S): at 11:26

## 2021-01-01 RX ADMIN — Medication 3 MILLIGRAM(S): at 21:38

## 2021-01-01 RX ADMIN — INSULIN GLARGINE 32 UNIT(S): 100 INJECTION, SOLUTION SUBCUTANEOUS at 21:57

## 2021-01-01 RX ADMIN — HEPARIN SODIUM 1500 UNIT(S)/HR: 5000 INJECTION INTRAVENOUS; SUBCUTANEOUS at 14:26

## 2021-01-01 RX ADMIN — OXYCODONE AND ACETAMINOPHEN 1 TABLET(S): 5; 325 TABLET ORAL at 05:28

## 2021-01-01 RX ADMIN — RANOLAZINE 500 MILLIGRAM(S): 500 TABLET, FILM COATED, EXTENDED RELEASE ORAL at 18:24

## 2021-01-01 RX ADMIN — SODIUM CHLORIDE 1000 MILLILITER(S): 9 INJECTION, SOLUTION INTRAVENOUS at 05:00

## 2021-01-01 RX ADMIN — FENTANYL CITRATE 25 MICROGRAM(S): 50 INJECTION INTRAVENOUS at 21:39

## 2021-01-01 RX ADMIN — HEPARIN SODIUM 1400 UNIT(S)/HR: 5000 INJECTION INTRAVENOUS; SUBCUTANEOUS at 06:12

## 2021-01-01 RX ADMIN — OXYCODONE HYDROCHLORIDE 5 MILLIGRAM(S): 5 TABLET ORAL at 21:01

## 2021-01-01 RX ADMIN — Medication 2: at 08:32

## 2021-01-01 RX ADMIN — DEXMEDETOMIDINE HYDROCHLORIDE IN 0.9% SODIUM CHLORIDE 7.24 MICROGRAM(S)/KG/HR: 4 INJECTION INTRAVENOUS at 20:00

## 2021-01-01 RX ADMIN — Medication 6 UNIT(S): at 16:06

## 2021-01-01 RX ADMIN — CHLORHEXIDINE GLUCONATE 1 APPLICATION(S): 213 SOLUTION TOPICAL at 05:50

## 2021-01-01 RX ADMIN — Medication 200 MILLIGRAM(S): at 05:58

## 2021-01-01 RX ADMIN — Medication 4: at 06:02

## 2021-01-01 RX ADMIN — PIPERACILLIN AND TAZOBACTAM 25 GRAM(S): 4; .5 INJECTION, POWDER, LYOPHILIZED, FOR SOLUTION INTRAVENOUS at 13:47

## 2021-01-01 RX ADMIN — INSULIN GLARGINE 22 UNIT(S): 100 INJECTION, SOLUTION SUBCUTANEOUS at 21:56

## 2021-01-01 RX ADMIN — SENNA PLUS 2 TABLET(S): 8.6 TABLET ORAL at 22:26

## 2021-01-01 RX ADMIN — Medication 100 MILLIGRAM(S): at 21:09

## 2021-01-01 RX ADMIN — Medication 2: at 07:46

## 2021-01-01 RX ADMIN — Medication 325 MILLIGRAM(S): at 11:56

## 2021-01-01 RX ADMIN — Medication 325 MILLIGRAM(S): at 12:23

## 2021-01-01 RX ADMIN — Medication 6: at 17:37

## 2021-01-01 RX ADMIN — INSULIN GLARGINE 15 UNIT(S): 100 INJECTION, SOLUTION SUBCUTANEOUS at 21:41

## 2021-01-01 RX ADMIN — Medication 9.05 MICROGRAM(S)/KG/MIN: at 02:34

## 2021-01-01 RX ADMIN — Medication 2 MILLIGRAM(S): at 16:00

## 2021-01-01 RX ADMIN — Medication 650 MILLIGRAM(S): at 23:10

## 2021-01-01 RX ADMIN — OXYCODONE AND ACETAMINOPHEN 1 TABLET(S): 5; 325 TABLET ORAL at 22:51

## 2021-01-01 RX ADMIN — Medication 25 MILLIGRAM(S): at 16:34

## 2021-01-01 RX ADMIN — Medication 250 MILLIGRAM(S): at 14:13

## 2021-01-01 RX ADMIN — ENOXAPARIN SODIUM 40 MILLIGRAM(S): 100 INJECTION SUBCUTANEOUS at 11:24

## 2021-01-01 RX ADMIN — SODIUM CHLORIDE 500 MILLILITER(S): 9 INJECTION INTRAMUSCULAR; INTRAVENOUS; SUBCUTANEOUS at 13:54

## 2021-01-01 RX ADMIN — LISINOPRIL 40 MILLIGRAM(S): 2.5 TABLET ORAL at 04:57

## 2021-01-01 RX ADMIN — RANOLAZINE 500 MILLIGRAM(S): 500 TABLET, FILM COATED, EXTENDED RELEASE ORAL at 17:34

## 2021-01-01 RX ADMIN — LEVALBUTEROL 1.25 MILLIGRAM(S): 1.25 SOLUTION, CONCENTRATE RESPIRATORY (INHALATION) at 08:22

## 2021-01-01 RX ADMIN — CILOSTAZOL 100 MILLIGRAM(S): 100 TABLET ORAL at 18:24

## 2021-01-01 RX ADMIN — POLYETHYLENE GLYCOL 3350 17 GRAM(S): 17 POWDER, FOR SOLUTION ORAL at 14:36

## 2021-01-01 RX ADMIN — Medication 100 MILLIGRAM(S): at 18:41

## 2021-01-01 RX ADMIN — Medication 6: at 16:34

## 2021-01-01 RX ADMIN — OXYCODONE HYDROCHLORIDE 10 MILLIGRAM(S): 5 TABLET ORAL at 21:42

## 2021-01-01 RX ADMIN — Medication 325 MILLIGRAM(S): at 11:19

## 2021-01-01 RX ADMIN — OXYCODONE AND ACETAMINOPHEN 1 TABLET(S): 5; 325 TABLET ORAL at 10:43

## 2021-01-01 RX ADMIN — Medication 88 MICROGRAM(S): at 04:37

## 2021-01-01 RX ADMIN — OXYCODONE HYDROCHLORIDE 10 MILLIGRAM(S): 5 TABLET ORAL at 08:11

## 2021-01-01 RX ADMIN — RANOLAZINE 500 MILLIGRAM(S): 500 TABLET, FILM COATED, EXTENDED RELEASE ORAL at 17:14

## 2021-01-01 RX ADMIN — CILOSTAZOL 100 MILLIGRAM(S): 100 TABLET ORAL at 17:43

## 2021-01-01 RX ADMIN — Medication 250 MILLIGRAM(S): at 01:11

## 2021-01-01 RX ADMIN — CILOSTAZOL 100 MILLIGRAM(S): 100 TABLET ORAL at 18:27

## 2021-01-01 RX ADMIN — Medication 100 MILLIGRAM(S): at 21:48

## 2021-01-20 NOTE — ED CDU PROVIDER INITIAL DAY NOTE - PHYSICAL EXAMINATION
Integument:  Skin warm, dry and supple bilateral.    Abrasion superficial Right 3rd toe and fissure R heel, - hyperkeratotic border,  + edema, + joanna-wound erythema, - purulence, - fluctuance, - tracking/tunneling,    Vascular: Dorsalis Pedis and Posterior Tibial pulses 1/4.  Capillary re-fill time less then 3 seconds digits 1-5 bilateral.    Neuro: Protective sensation diminished to the level of the digits bilateral.  MSK: Muscle strength 5/5 all major muscle groups bilateral.  Deformity:  A: Right foot cellulitis to mid foot

## 2021-01-20 NOTE — ED STATDOCS - PROGRESS NOTE DETAILS
Labs reviewed, lactated mildly elevated, all others WNL. XRay WNL. Podiatry re-wrapped right foot, recommending IV antibiotics considering patient failed outpatient treatment (zithromax). To start on IV ancef and bactrim, move to obs

## 2021-01-20 NOTE — ED CDU PROVIDER INITIAL DAY NOTE - PMH
Coronary artery disease, angina presence unspecified, unspecified vessel or lesion type, unspecified whether native or transplanted heart    Hyperlipidemia, unspecified hyperlipidemia type    Pulmonary emphysema, unspecified emphysema type    Type 2 diabetes mellitus with other specified complication, unspecified whether long term insulin use

## 2021-01-20 NOTE — ED STATDOCS - CLINICAL SUMMARY MEDICAL DECISION MAKING FREE TEXT BOX
Concern for osteomyelitis. Will obtain X-ray and ultrasound. Concern for osteomyelitis vs foot infection  vs cellulitis of foot. Will obtain X-ray  foot and ultrasound leg to r/o dvt and call for PT eval , mino also consult podiatry. Concern for osteomyelitis vs foot infection  vs cellulitis of foot.   Labs and imaging reviewed with patient, no evidence of osteomyelitis.  Podiatry consult recommending continue IV antibiotics, will be transferred to obs.

## 2021-01-20 NOTE — ED CDU PROVIDER INITIAL DAY NOTE - ATTENDING CONTRIBUTION TO CARE
I agree with the PA's note and was available for any issues/concerns. I was directly involved in patient care. My brief overall assessment is as follows: pt with ?faint redness to dorsal right foot, some ttp with old scratch from 2 weeks ago, minimal warmth, also with ulcer to tip of 3rd toe s/p podiatrist nail clipping 2 days ago per pt. No f/c. otehrwise well. no wbc, to obs for 24 hours abx. podiatry saw pt, no intervention.

## 2021-01-20 NOTE — ED STATDOCS - DR. NAME
Vivienne Gabapentin Counseling: I discussed with the patient the risks of gabapentin including but not limited to dizziness, somnolence, fatigue and ataxia.

## 2021-01-20 NOTE — ED CDU PROVIDER INITIAL DAY NOTE - OBJECTIVE STATEMENT
80y year old Female PMH of NDM II , HTN , CAD , HLD , High triglyceride, and left side of carotid stenosis on (Lipitor)  HX Emphysema with  Right foot 3rd toe abrasion started onset 1 weeks ago and cellulitis as the day passed .  Patient relates to having the abrasion since Monday and redness, swelling and edema for 1 week. Pt saw Dr Steinberg (her podiatrist) Monday who trimmed her nails and gave her azithromycin for cellulitis. Pt states she did not see much improvement after taking abx and started having pain and sensitivity to her right foot. Pt denies any trauma or any other foot related complaint. now pain is more sensitive on touch and walking rated 8/10sharp  states she took Ibuprofen for the pain was not helping. she denies any hx of fall or trauma or injury

## 2021-01-20 NOTE — ED STATDOCS - PLAN OF CARE
improvement Cellulitis of R foot, started PO Abx on Monday without improvement. VSS, Lactate mildly elevated, XR WNL. Per podiatry, would benefit from continued IV Abx  at least 24hrs

## 2021-01-20 NOTE — ED ADULT TRIAGE NOTE - CHIEF COMPLAINT QUOTE
pt arrived with right foot redness, swelling c/o increased pain to right foot that brought her to ED pt foot non healing pt wanted to be evaluated by MD sees podiatrist out pt but pain is not going away.

## 2021-01-20 NOTE — ED STATDOCS - CARE PLAN
Principal Discharge DX:	Cellulitis of foot, right  Goal:	improvement  Assessment and plan of treatment:	Cellulitis of R foot, started PO Abx on Monday without improvement. VSS, Lactate mildly elevated, XR WNL. Per podiatry, would benefit from continued IV Abx  at least 24hrs

## 2021-01-20 NOTE — ED STATDOCS - OBJECTIVE STATEMENT
79 y/o F with PMHx of DM and HTN presents to ED c/o scratched right foot onset 2 weeks ago. Pt states she uses 2 walkers at home and currently lives with her grandson and son in-law. Denies fever. 79 y/o F with PMHx of DM and HTN presents to ED c/o scratched right foot onset 2 weeks ago. and now  its red and swollen and more painful.Pt states she uses 2 walkers at home and currently lives with her grandson and son in-law. Denies fever.

## 2021-01-20 NOTE — ED STATDOCS - MUSCULOSKELETAL, MLM
swollen at dorsum of right foot, 2 scratches at mid foot, chronic poor circulation swollen at dorsum of right foot with redness of dorsum feet and toes, 2 scratches at mid foot, chronic poor circulation

## 2021-01-20 NOTE — ED ADULT NURSE NOTE - NSIMPLEMENTINTERV_GEN_ALL_ED
Implemented All Universal Safety Interventions:  [place] to call system. Call bell, personal items and telephone within reach. Instruct patient to call for assistance. Room bathroom lighting operational. Non-slip footwear when patient is off stretcher. Physically safe environment: no spills, clutter or unnecessary equipment. Stretcher in lowest position, wheels locked, appropriate side rails in place.

## 2021-01-20 NOTE — ED ADULT NURSE NOTE - OBJECTIVE STATEMENT
Patient presents to ER C/O right foot/heel pain, patient currently on ABX prescribed by podiatrist on Monday, mild swelling noted, denies any recent injuries, HX of DM, resp even/unlabored, denies chills/fever, lungs CTAB.

## 2021-01-20 NOTE — ED CDU PROVIDER INITIAL DAY NOTE - FAMILY HISTORY
Father  Still living? No  Family history of stomach cancer, Age at diagnosis: Age Unknown     Mother  Still living? No  Family history of liver cancer, Age at diagnosis: Age Unknown

## 2021-01-20 NOTE — ED CDU PROVIDER INITIAL DAY NOTE - MEDICAL DECISION MAKING DETAILS
pt with hx dm with hx of abrasion on the distal right 3rd phalanx who had  f.u in podiatry and after trim the nail start the pt on azithromycin bot working   given dose of vanco in ER   distal 3rd phalanx with ulcer and cellulites to mid foot star the pt on cefazolin 1000mg TId and bactrim PO BID   repeat the lactate   BC is draw   DM on Sliding scale   home med control BP  RLE elevation .

## 2021-01-20 NOTE — CONSULT NOTE ADULT - SUBJECTIVE AND OBJECTIVE BOX
S : 80y year old Female seen at bedside for Right foot 3rd toe abrasion and cellulitis.  Patient relates to having the abrasion since Monday and redness, swelling and edema for 1 week. Pt saw Dr Steinberg (her podiatrist) monday who trimmed her nails and gave her azithromycin for cellulitis. Pt states she did not see much improvement after taking abx and started having pain and sensitivity to her right foot. Pt denies any trauma or any other foot related complaint.  Patient admits to  (-) Fevers, (-) Chills, (-) Nausea, (-) Vomiting, (-) Shortness of Breath      PMH: DM  PSH:    Allergies:No Known Allergies      Labs:                          13.2   10.13 )-----------( 377      ( 20 Jan 2021 14:11 )             40.4     WBC Trend  10.13 Date (01-20 @ 14:11)      Chem  01-20    137  |  101  |  20.0  ----------------------------<  136<H>  4.2   |  23.0  |  1.02    Ca    11.2<H>      20 Jan 2021 14:11    TPro  7.0  /  Alb  4.0  /  TBili  0.3<L>  /  DBili  x   /  AST  17  /  ALT  15  /  AlkPhos  53  01-20          T(F): 98 (01-20-21 @ 17:23), Max: 98.2 (01-20-21 @ 12:54)  HR: 82 (01-20-21 @ 17:23) (82 - 88)  BP: 125/70 (01-20-21 @ 17:23) (125/70 - 175/92)  RR: 18 (01-20-21 @ 17:23) (16 - 18)  SpO2: 95% (01-20-21 @ 17:23) (95% - 95%)  Wt(kg): --    O:   General: Pleasant  female NAD & AOX3.    Integument:  Skin warm, dry and supple bilateral.    Abrasion superficial Right 3rd toe and fissure R heel, - hyperkeratotic border,  + edema, + joanna-wound erythema, - purulence, - fluctuance, - tracking/tunneling, - probe to bone.   Vascular: Dorsalis Pedis and Posterior Tibial pulses 1/4.  Capillary re-fill time less then 3 seconds digits 1-5 bilateral.    Neuro: Protective sensation diminished to the level of the digits bilateral.  MSK: Muscle strength 5/5 all major muscle groups bilateral.  Deformity:  A: Right foot cellulitis      P:   Chart reviewed and Patient evaluated  Discussed diagnosis and treatment with patient  Applied betadine with dry sterile dressing and ACE  X-rays reviewed neg  Continue with IV antibiotics As Per ID  Weight bearing full  Offloading to bilateral Heels.   Discussed importance of daily foot examinations and proper shoe gear and to importance of lower Fasting Blood Glucose levels.   Podiatry will follow while in house.  Discussed with Attending Dr Mann

## 2021-01-21 NOTE — ED CDU PROVIDER SUBSEQUENT DAY NOTE - HISTORY
Pt resting comfortably at time of re-assessment. No events overnight. Pending IV abx. Will continue to monitor.

## 2021-01-21 NOTE — ED CDU PROVIDER SUBSEQUENT DAY NOTE - ATTENDING CONTRIBUTION TO CARE
Pt with h/o DM presenting with cellulitis to R foot, seen by podiatry who recommends eval by ID. Pt reexamined this morning, no improvement in redness, still c/o pain. Pt to be admitted to medicine for further management as she has already failed outpatient antibiotic treatment. Tamela Murdock DO     I performed a history and physical exam of the patient and discussed their management with the advanced care provider. I reviewed the advanced care provider's note and agree with the documented findings and plan of care. My medical decision making and objective findings are found above.

## 2021-01-21 NOTE — H&P ADULT - HISTORY OF PRESENT ILLNESS
80F who presented yesterday with right foot pain and swelling. She reported that she developed an abrasion to the third toe when her podiatrist was trimming her toenail. She had taken the antibiotics that were prescribed to her without improvement and presented to the hospital for evaluation. The patient was evaluated by the emergency department and antibiotics were initiated. The patient was place under observation status. Her symptoms did not improve and the patient was thought to require admission to the hospital. The patient reported that she had continued pain at the toe since the injury and developed swelling to the foot and pain in the heel which limited her ability to stand or walk. She denied any associated fevers, chills, or discharge from the toe. She denied any similar episodes in the past and did not have a history of foot infection in the past. She also admitted to a prior fall with injury to the left knee which required aspiration of fluid from the knee.

## 2021-01-21 NOTE — H&P ADULT - NSHPPHYSICALEXAM_GEN_ALL_CORE
Vital Signs Last 24 Hrs  T(C): 36.9 (21 Jan 2021 10:52), Max: 36.9 (21 Jan 2021 10:52)  T(F): 98.4 (21 Jan 2021 10:52), Max: 98.4 (21 Jan 2021 10:52)  HR: 80 (21 Jan 2021 10:52) (75 - 92)  BP: 115/60 (21 Jan 2021 10:52) (115/60 - 179/72)  BP(mean): --  RR: 18 (21 Jan 2021 10:52) (16 - 18)  SpO2: 93% (21 Jan 2021 10:52) (93% - 95%)    General appearance: No acute distress, Awake, Alert  HEENT: Normocephalic, Atraumatic, Conjunctiva clear, EOMI  Neck: Supple, No JVD, No tenderness  Lungs: Breath sound equal bilaterally, No wheezes, No rales  Cardiovascular: S1S2, Regular rhythm  Abdomen: Soft, Nontender, Nondistended, No guarding/rebound, Positive bowel sounds  Extremities: No clubbing, No cyanosis, No edema, No calf tenderness  Neuro: Strength equal bilaterally, No tremors  Psychiatric: Appropriate mood, Normal affect Vital Signs Last 24 Hrs  T(C): 36.9 (21 Jan 2021 10:52), Max: 36.9 (21 Jan 2021 10:52)  T(F): 98.4 (21 Jan 2021 10:52), Max: 98.4 (21 Jan 2021 10:52)  HR: 80 (21 Jan 2021 10:52) (75 - 92)  BP: 115/60 (21 Jan 2021 10:52) (115/60 - 179/72)  BP(mean): --  RR: 18 (21 Jan 2021 10:52) (16 - 18)  SpO2: 93% (21 Jan 2021 10:52) (93% - 95%)    General appearance: No acute distress, Awake, Alert  HEENT: Normocephalic, Atraumatic, Conjunctiva clear, EOMI  Neck: Supple, No JVD, No tenderness  Lungs: Breath sound equal bilaterally, No wheezes, No rales  Cardiovascular: S1S2, Regular rhythm  Abdomen: Soft, Nontender, Nondistended, No guarding/rebound, Positive bowel sounds  Extremities: No clubbing, No cyanosis, No calf tenderness, Mild pedal edema, Right foot dressing clean and in place, Mild left knee swelling  Neuro: Strength equal bilaterally, No tremors  Psychiatric: Appropriate mood, Normal affect

## 2021-01-21 NOTE — ED ADULT NURSE REASSESSMENT NOTE - NS ED NURSE REASSESS COMMENT FT1
Care endorsed to Danita Bradley RN ESS[facility]. Patient transferred via stretcher to CDU 13L. VSS. Patient in NAD. RN with no further questions.
MD Kasper at the bedside. No cardiac monitor per MD orders. Patient to be admitted to med/surg.
Patient in understanding of plan of care of being admitted to the hospital. VSS. FS AC HS. Patient with no further questions for the RN. Will continue to monitor.
Received report from CARLOS Tee. JUAN noted, respirations even and nonlabored. Rt foot wrapped in ace bandage. MATY Garcia at bedside.
Assumed care of the patient at 0730. Verbal report received from Destiny GONZALEZ ED. Patient A&Ox4. Patient with persistent pain to right foot, right foot noted with dressing and ace wrap, dressing c/d/i, no drainage or bleeding noted. Toes warm to touch and mobile. FS AC HS. VSS, afebrile. Patient pending podiatry consult and IV abx infusion. Patient in understanding of plan of care. Patient with no further questions for the RN. Resting in comfort. Call bell within reach and encouraged to use when assistance needed. Will continue to monitor.

## 2021-01-21 NOTE — H&P ADULT - NSICDXFAMILYHX_GEN_ALL_CORE_FT
FAMILY HISTORY:  Father  Still living? No  Family history of stomach cancer, Age at diagnosis: Age Unknown    Mother  Still living? No  Family history of liver cancer, Age at diagnosis: Age Unknown

## 2021-01-21 NOTE — CONSULT NOTE ADULT - SUBJECTIVE AND OBJECTIVE BOX
INFECTIOUS DISEASES AND INTERNAL MEDICINE at [place]  =======================================================  Shabbir Laurent MD  Diplomates American Board of Internal Medicine and Infectious Diseases  Telephone [phone]  Fax            [phone]  =======================================================    JAMAAL [id number]      HPI:  80F who presented yesterday with right foot pain and swelling. She reported that she developed an abrasion to the third toe when her podiatrist was trimming her toenail. She had taken the antibiotics that were prescribed to her without improvement and presented to the hospital for evaluation. The patient was evaluated by the emergency department and antibiotics were initiated. The patient was place under observation status. Her symptoms did not improve and the patient was thought to require admission to the hospital. The patient reported that she had continued pain at the toe since the injury and developed swelling to the foot and pain in the heel which limited her ability to stand or walk. She denied any associated fevers, chills, or discharge from the toe. She denied any similar episodes in the past and did not have a history of foot infection in the past. She also admitted to a prior fall with injury to the left knee which required aspiration of fluid from the knee.  PT PLACED ON EMPIRIC IV ABX   ASKED TO EVALUATE FROM ID STANDPOINT       PAST MEDICAL & SURGICAL HISTORY:  Type 2 diabetes mellitus with other specified complication, unspecified whether long term insulin use    Hyperlipidemia, unspecified hyperlipidemia type    Coronary artery disease, angina presence unspecified, unspecified vessel or lesion type, unspecified whether native or transplanted heart    Pulmonary emphysema, unspecified emphysema type        ANTIBIOTICS  vancomycin  IVPB 500 milliGRAM(s) IV Intermittent every 12 hours      Allergies    No Known Allergies    Intolerances        SOCIAL HISTORY:     FAMILY HX   FAMILY HISTORY:  Family history of liver cancer (Mother)    Family history of stomach cancer (Father)        Vital Signs Last 24 Hrs  T(C): 36.7 (21 Jan 2021 16:01), Max: 36.9 (21 Jan 2021 10:52)  T(F): 98 (21 Jan 2021 16:01), Max: 98.4 (21 Jan 2021 10:52)  HR: 90 (21 Jan 2021 16:01) (75 - 92)  BP: 133/76 (21 Jan 2021 16:01) (115/60 - 179/72)  BP(mean): --  RR: 18 (21 Jan 2021 16:01) (18 - 18)  SpO2: 95% (21 Jan 2021 16:01) (93% - 95%)  Drug Dosing Weight  Height (cm): 152.4 (20 Jan 2021 12:54)  Weight (kg): 90.7 (20 Jan 2021 12:54)  BMI (kg/m2): 39.1 (20 Jan 2021 12:54)  BSA (m2): 1.87 (20 Jan 2021 12:54)      REVIEW OF SYSTEMS:    CONSTITUTIONAL:  As per HPI.    HEENT:  Eyes:  No diplopia or blurred vision. ENT:  No earache, sore throat or runny nose.    CARDIOVASCULAR:  No pressure, squeezing, strangling, tightness, heaviness or aching about the chest, neck, axilla or epigastrium.    RESPIRATORY:  No cough, shortness of breath, PND or orthopnea.    GASTROINTESTINAL:  No nausea, vomiting or diarrhea.    GENITOURINARY:  No dysuria, frequency or urgency.    MUSCULOSKELETAL:  As per HPI.    SKIN:  No change in skin, hair or nails.    NEUROLOGIC:  No paresthesias, fasciculations, seizures or weakness.                  PHYSICAL EXAMINATION:    GENERAL: The patient is a well-developed, well-nourished _____in no apparent distress. ___ is alert and oriented x3.    VITAL SIGNS: T(C): 36.7 (01-21-21 @ 16:01), Max: 36.9 (01-21-21 @ 10:52)  HR: 90 (01-21-21 @ 16:01) (75 - 92)  BP: 133/76 (01-21-21 @ 16:01) (115/60 - 179/72)  RR: 18 (01-21-21 @ 16:01) (18 - 18)  SpO2: 95% (01-21-21 @ 16:01) (93% - 95%)  Wt(kg): --    HEENT: Head is normocephalic and atraumatic.  ANICTERIC  NECK: Supple. No carotid bruits.  No lymphadenopathy or thyromegaly.    LUNGS:COARSE BREATH SOUNDS    HEART: Regular rate and rhythm without murmur.    ABDOMEN: Soft, nontender, and nondistended.  Positive bowel sounds.  No hepatosplenomegaly was noted. NO REBOUND NO GUARDING    EXTREMITIES: NO EDEMA NO ERYTHEMA    NEUROLOGIC: NON FOCAL      SKIN: No ulceration or induration present. NO RASH        BLOOD CULTURES       URINE CX          LABS:                        13.2   10.13 )-----------( 377      ( 20 Jan 2021 14:11 )             40.4     01-20    137  |  101  |  20.0  ----------------------------<  136<H>  4.2   |  23.0  |  1.02    Ca    11.2<H>      20 Jan 2021 14:11    TPro  7.0  /  Alb  4.0  /  TBili  0.3<L>  /  DBili  x   /  AST  17  /  ALT  15  /  AlkPhos  53  01-20          RADIOLOGY & ADDITIONAL STUDIES:      ASSESSMENT/PLAN    80F who presented yesterday with right foot pain and swelling. She reported that she developed an abrasion to the third toe when her podiatrist was trimming her toenail. She had taken the antibiotics that were prescribed to her without improvement and presented to the hospital for evaluation. The patient was evaluated by the emergency department and antibiotics were initiated. The patient was place under observation status. Her symptoms did not improve and the patient was thought to require admission to the hospital. The patient reported that she had continued pain at the toe since the injury and developed swelling to the foot and pain in the heel which limited her ability to stand or walk. She denied any associated fevers, chills, or discharge from the toe. She denied any similar episodes in the past and did not have a history of foot infection in the past. She also admitted to a prior fall with injury to the left knee which required aspiration of fluid from the knee.  PT PLACED ON EMPIRIC IV ABX   LEFT FOOT WITH EDEMA AND ERYTHEMA    BLOOD CX DRAWN  PENDING  CAN CONTNUE EMPIRIC ABX WILL FOLLOWUP BLOOD CX  KEEP FOOT ELEVATED WARM SOAKS  WOULD CHECK URIC ACID FOR ? GOUT  WILL FOLLOW UP                       SARWAT ANDINO MD INFECTIOUS DISEASES AND INTERNAL MEDICINE at [place]  =======================================================  Shabbir Laurent MD  Diplomates American Board of Internal Medicine and Infectious Diseases  Telephone [phone]  Fax            [phone]  =======================================================    JAMAAL [id number]      HPI:  80F who presented yesterday with right foot pain and swelling. She reported that she developed an abrasion to the third toe when her podiatrist was trimming her toenail. She had taken the antibiotics that were prescribed to her without improvement and presented to the hospital for evaluation. The patient was evaluated by the emergency department and antibiotics were initiated. The patient was place under observation status. Her symptoms did not improve and the patient was thought to require admission to the hospital. The patient reported that she had continued pain at the toe since the injury and developed swelling to the foot and pain in the heel which limited her ability to stand or walk. She denied any associated fevers, chills, or discharge from the toe. She denied any similar episodes in the past and did not have a history of foot infection in the past. She also admitted to a prior fall with injury to the left knee which required aspiration of fluid from the knee.  PT PLACED ON EMPIRIC IV ABX   ASKED TO EVALUATE FROM ID STANDPOINT       PAST MEDICAL & SURGICAL HISTORY:  Type 2 diabetes mellitus with other specified complication, unspecified whether long term insulin use    Hyperlipidemia, unspecified hyperlipidemia type    Coronary artery disease, angina presence unspecified, unspecified vessel or lesion type, unspecified whether native or transplanted heart    Pulmonary emphysema, unspecified emphysema type        ANTIBIOTICS  vancomycin  IVPB 500 milliGRAM(s) IV Intermittent every 12 hours      Allergies    No Known Allergies    Intolerances        SOCIAL HISTORY:     FAMILY HX   FAMILY HISTORY:  Family history of liver cancer (Mother)    Family history of stomach cancer (Father)        Vital Signs Last 24 Hrs  T(C): 36.7 (21 Jan 2021 16:01), Max: 36.9 (21 Jan 2021 10:52)  T(F): 98 (21 Jan 2021 16:01), Max: 98.4 (21 Jan 2021 10:52)  HR: 90 (21 Jan 2021 16:01) (75 - 92)  BP: 133/76 (21 Jan 2021 16:01) (115/60 - 179/72)  BP(mean): --  RR: 18 (21 Jan 2021 16:01) (18 - 18)  SpO2: 95% (21 Jan 2021 16:01) (93% - 95%)  Drug Dosing Weight  Height (cm): 152.4 (20 Jan 2021 12:54)  Weight (kg): 90.7 (20 Jan 2021 12:54)  BMI (kg/m2): 39.1 (20 Jan 2021 12:54)  BSA (m2): 1.87 (20 Jan 2021 12:54)      REVIEW OF SYSTEMS:    CONSTITUTIONAL:  As per HPI.    HEENT:  Eyes:  No diplopia or blurred vision. ENT:  No earache, sore throat or runny nose.    CARDIOVASCULAR:  No pressure, squeezing, strangling, tightness, heaviness or aching about the chest, neck, axilla or epigastrium.    RESPIRATORY:  No cough, shortness of breath, PND or orthopnea.    GASTROINTESTINAL:  No nausea, vomiting or diarrhea.    GENITOURINARY:  No dysuria, frequency or urgency.    MUSCULOSKELETAL:  As per HPI.    SKIN: as per hpi    NEUROLOGIC:  No paresthesias, fasciculations, seizures or weakness.                  PHYSICAL EXAMINATION:    GENERAL: The patient is a well-developed, well-nourished _____in no apparent distress. ___ is alert and oriented x3.    VITAL SIGNS: T(C): 36.7 (01-21-21 @ 16:01), Max: 36.9 (01-21-21 @ 10:52)  HR: 90 (01-21-21 @ 16:01) (75 - 92)  BP: 133/76 (01-21-21 @ 16:01) (115/60 - 179/72)  RR: 18 (01-21-21 @ 16:01) (18 - 18)  SpO2: 95% (01-21-21 @ 16:01) (93% - 95%)  Wt(kg): --    HEENT: Head is normocephalic and atraumatic.  ANICTERIC  NECK: Supple. No carotid bruits.  No lymphadenopathy or thyromegaly.    LUNGS:COARSE BREATH SOUNDS    HEART: Regular rate and rhythm without murmur.    ABDOMEN: Soft, nontender, and nondistended.  Positive bowel sounds.  No hepatosplenomegaly was noted. NO REBOUND NO GUARDING    EXTREMITIES: LEFT FOOT WITH EDEMA AND ERYTHEMA MILD TENDERNESS NO CREPITUS SKIN IS DRY AND CRACKED ON HEEL 3RD TOE WITH MILD ERYTHEMA    NEUROLOGIC: NON FOCAL      SKIN: No ulceration or induration present. NO RASH        BLOOD CULTURES       URINE CX          LABS:                        13.2   10.13 )-----------( 377      ( 20 Jan 2021 14:11 )             40.4     01-20    137  |  101  |  20.0  ----------------------------<  136<H>  4.2   |  23.0  |  1.02    Ca    11.2<H>      20 Jan 2021 14:11    TPro  7.0  /  Alb  4.0  /  TBili  0.3<L>  /  DBili  x   /  AST  17  /  ALT  15  /  AlkPhos  53  01-20          RADIOLOGY & ADDITIONAL STUDIES:      ASSESSMENT/PLAN    80F who presented yesterday with right foot pain and swelling. She reported that she developed an abrasion to the third toe when her podiatrist was trimming her toenail. She had taken the antibiotics that were prescribed to her without improvement and presented to the hospital for evaluation. The patient was evaluated by the emergency department and antibiotics were initiated. The patient was place under observation status. Her symptoms did not improve and the patient was thought to require admission to the hospital. The patient reported that she had continued pain at the toe since the injury and developed swelling to the foot and pain in the heel which limited her ability to stand or walk. She denied any associated fevers, chills, or discharge from the toe. She denied any similar episodes in the past and did not have a history of foot infection in the past. She also admitted to a prior fall with injury to the left knee which required aspiration of fluid from the knee.  PT PLACED ON EMPIRIC IV ABX   LEFT FOOT WITH EDEMA AND ERYTHEMA    BLOOD CX DRAWN  PENDING  CAN CONTNUE EMPIRIC ABX WILL FOLLOWUP BLOOD CX  KEEP FOOT ELEVATED WARM SOAKS  WOULD CHECK URIC ACID FOR ? GOUT  WILL FOLLOW UP                       SARWAT ANDINO MD INFECTIOUS DISEASES AND INTERNAL MEDICINE at [place]  =======================================================  Shabbir Laurent MD  Diplomates American Board of Internal Medicine and Infectious Diseases  Telephone [phone]  Fax            [phone]  =======================================================    JAMAAL [id number]      HPI:  80F who presented yesterday with right foot pain and swelling. She reported that she developed an abrasion to the third toe when her podiatrist was trimming her toenail. She had taken the antibiotics that were prescribed to her without improvement and presented to the hospital for evaluation. The patient was evaluated by the emergency department and antibiotics were initiated. The patient was place under observation status. Her symptoms did not improve and the patient was thought to require admission to the hospital. The patient reported that she had continued pain at the toe since the injury and developed swelling to the foot and pain in the heel which limited her ability to stand or walk. She denied any associated fevers, chills, or discharge from the toe. She denied any similar episodes in the past and did not have a history of foot infection in the past. She also admitted to a prior fall with injury to the left knee which required aspiration of fluid from the knee.  PT PLACED ON EMPIRIC IV ABX   ASKED TO EVALUATE FROM ID STANDPOINT       PAST MEDICAL & SURGICAL HISTORY:  Type 2 diabetes mellitus with other specified complication, unspecified whether long term insulin use    Hyperlipidemia, unspecified hyperlipidemia type    Coronary artery disease, angina presence unspecified, unspecified vessel or lesion type, unspecified whether native or transplanted heart    Pulmonary emphysema, unspecified emphysema type        ANTIBIOTICS  vancomycin  IVPB 500 milliGRAM(s) IV Intermittent every 12 hours      Allergies    No Known Allergies    Intolerances        SOCIAL HISTORY:     FAMILY HX   FAMILY HISTORY:  Family history of liver cancer (Mother)    Family history of stomach cancer (Father)        Vital Signs Last 24 Hrs  T(C): 36.7 (21 Jan 2021 16:01), Max: 36.9 (21 Jan 2021 10:52)  T(F): 98 (21 Jan 2021 16:01), Max: 98.4 (21 Jan 2021 10:52)  HR: 90 (21 Jan 2021 16:01) (75 - 92)  BP: 133/76 (21 Jan 2021 16:01) (115/60 - 179/72)  BP(mean): --  RR: 18 (21 Jan 2021 16:01) (18 - 18)  SpO2: 95% (21 Jan 2021 16:01) (93% - 95%)  Drug Dosing Weight  Height (cm): 152.4 (20 Jan 2021 12:54)  Weight (kg): 90.7 (20 Jan 2021 12:54)  BMI (kg/m2): 39.1 (20 Jan 2021 12:54)  BSA (m2): 1.87 (20 Jan 2021 12:54)      REVIEW OF SYSTEMS:    CONSTITUTIONAL:  As per HPI.    HEENT:  Eyes:  No diplopia or blurred vision. ENT:  No earache, sore throat or runny nose.    CARDIOVASCULAR:  No pressure, squeezing, strangling, tightness, heaviness or aching about the chest, neck, axilla or epigastrium.    RESPIRATORY:  No cough, shortness of breath, PND or orthopnea.    GASTROINTESTINAL:  No nausea, vomiting or diarrhea.    GENITOURINARY:  No dysuria, frequency or urgency.    MUSCULOSKELETAL:  As per HPI.    SKIN: as per hpi    NEUROLOGIC:  No paresthesias, fasciculations, seizures or weakness.                  PHYSICAL EXAMINATION:    GENERAL: The patient is a well-developed, well-nourished _____in no apparent distress. ___ is alert and oriented x3.    VITAL SIGNS: T(C): 36.7 (01-21-21 @ 16:01), Max: 36.9 (01-21-21 @ 10:52)  HR: 90 (01-21-21 @ 16:01) (75 - 92)  BP: 133/76 (01-21-21 @ 16:01) (115/60 - 179/72)  RR: 18 (01-21-21 @ 16:01) (18 - 18)  SpO2: 95% (01-21-21 @ 16:01) (93% - 95%)  Wt(kg): --    HEENT: Head is normocephalic and atraumatic.  ANICTERIC  NECK: Supple. No carotid bruits.  No lymphadenopathy or thyromegaly.    LUNGS:COARSE BREATH SOUNDS    HEART: Regular rate and rhythm without murmur.    ABDOMEN: Soft, nontender, and nondistended.  Positive bowel sounds.  No hepatosplenomegaly was noted. NO REBOUND NO GUARDING    EXTREMITIES:RIGHT  FOOT WITH EDEMA AND ERYTHEMA MILD TENDERNESS NO CREPITUS SKIN IS DRY AND CRACKED ON HEEL 3RD TOE WITH MILD ERYTHEMA    NEUROLOGIC: NON FOCAL      SKIN: No ulceration or induration present. NO RASH        BLOOD CULTURES       URINE CX          LABS:                        13.2   10.13 )-----------( 377      ( 20 Jan 2021 14:11 )             40.4     01-20    137  |  101  |  20.0  ----------------------------<  136<H>  4.2   |  23.0  |  1.02    Ca    11.2<H>      20 Jan 2021 14:11    TPro  7.0  /  Alb  4.0  /  TBili  0.3<L>  /  DBili  x   /  AST  17  /  ALT  15  /  AlkPhos  53  01-20          RADIOLOGY & ADDITIONAL STUDIES:      ASSESSMENT/PLAN    80F who presented yesterday with right foot pain and swelling. She reported that she developed an abrasion to the third toe when her podiatrist was trimming her toenail. She had taken the antibiotics that were prescribed to her without improvement and presented to the hospital for evaluation. The patient was evaluated by the emergency department and antibiotics were initiated. The patient was place under observation status. Her symptoms did not improve and the patient was thought to require admission to the hospital. The patient reported that she had continued pain at the toe since the injury and developed swelling to the foot and pain in the heel which limited her ability to stand or walk. She denied any associated fevers, chills, or discharge from the toe. She denied any similar episodes in the past and did not have a history of foot infection in the past. She also admitted to a prior fall with injury to the left knee which required aspiration of fluid from the knee.  PT PLACED ON EMPIRIC IV ABX   RIGHT FOOT WITH EDEMA AND ERYTHEMA    BLOOD CX DRAWN  PENDING  CAN CONTNUE EMPIRIC ABX WILL FOLLOWUP BLOOD CX  KEEP FOOT ELEVATED WARM SOAKS  WOULD CHECK URIC ACID FOR ? GOUT  WILL FOLLOW UP                       SARWAT ANDINO MD

## 2021-01-21 NOTE — H&P ADULT - NSICDXPASTMEDICALHX_GEN_ALL_CORE_FT
PAST MEDICAL HISTORY:  Coronary artery disease, angina presence unspecified, unspecified vessel or lesion type, unspecified whether native or transplanted heart     Hyperlipidemia, unspecified hyperlipidemia type     Pulmonary emphysema, unspecified emphysema type     Type 2 diabetes mellitus with other specified complication, unspecified whether long term insulin use

## 2021-01-21 NOTE — ED CDU PROVIDER DISPOSITION NOTE - CLINICAL COURSE
80y year old Female PMH of NDM II , HTN , CAD , HLD , High triglyceride, and left side of carotid stenosis on (Lipitor)  HX Emphysema with  Right foot 3rd toe abrasion started onset 1-2 weeks ago with wound and cellulitis as the day passed on the right 3rd phalanx .  Patient relates to having the abrasion since Monday and redness, swelling and edema for 1 week. Pt saw Dr Steinberg (her podiatrist) Monday who trimmed her nails and gave her azithromycin for cellulitis. kept the pt on the observation start the pt on cefazolin and bactrim PO . today redness and went higher up from the line up . pt still c.o pain . consulted ID will admit the pt

## 2021-01-21 NOTE — ED CDU PROVIDER SUBSEQUENT DAY NOTE - PHYSICAL EXAMINATION
MSK: TTP over dorsum of R foot. mild edema.  Vasc: DP and PT pulses 2+ b/l  Skin: Abrasion to R 3rd toe, very mild erythema to mid foot.

## 2021-01-21 NOTE — H&P ADULT - ASSESSMENT
80F with a history of diabetes, peripheral vascular disease, COPD, hypothyroidism, coronary artery disease, and hypertension who developed toe pain and foot swelling after an abrasion to the third toe.    Cellulitis - To continue on intravenous antibiotics. Podiatry consultation noted. Infectious Disease consultation requested by the emergency department and recommendations to be reviewed when available.    Diabetes - Insulin coverage, close monitoring of blood glucose levels.    Coronary artery disease - On aspirin, ranolazine, and atorvastatin.    Peripheral vascular disease - On cilostazol.    Hypertension - On lisinopril and metoprolol. Close blood pressure monitoring.    COPD - No respiratory symptoms or hypoxia on examination.    Hypothyroidism - On levothyroxine.    Hypercalcemia - The patient is noted to be taking calcium supplements at home. To repeat metabolic panel to monitor. 80F with a history of diabetes, peripheral vascular disease, COPD, hypothyroidism, coronary artery disease, and hypertension who developed toe pain and foot swelling after an abrasion to the third toe.    Cellulitis - To continue on intravenous antibiotics. Right lower extremity doppler was without deep venous thrombosis. Xray noted soft tissue swelling.  Podiatry consultation noted. Infectious Disease consultation requested by the emergency department and recommendations to be reviewed when available.    Diabetes - Insulin coverage, close monitoring of blood glucose levels.    Coronary artery disease - On aspirin, ranolazine, and atorvastatin.    Peripheral vascular disease - On cilostazol.    Hypertension - On lisinopril and metoprolol. Close blood pressure monitoring.    COPD - No respiratory symptoms or hypoxia on examination.    Hypothyroidism - On levothyroxine.    Hypercalcemia - The patient is noted to be taking calcium supplements at home. To repeat metabolic panel to monitor.

## 2021-01-22 NOTE — PROGRESS NOTE ADULT - ASSESSMENT
80F who presented yesterday with right foot pain and swelling. She reported that she developed an abrasion to the third toe when her podiatrist was trimming her toenail. She had taken the antibiotics that were prescribed to her without improvement and presented to the hospital for evaluation. The patient was evaluated by the emergency department and antibiotics were initiated. The patient was place under observation status. Her symptoms did not improve and the patient was thought to require admission to the hospital. The patient reported that she had continued pain at the toe since the injury and developed swelling to the foot and pain in the heel which limited her ability to stand or walk. She denied any associated fevers, chills, or discharge from the toe. She denied any similar episodes in the past and did not have a history of foot infection in the past. She also admitted to a prior fall with injury to the left knee which required aspiration of fluid from the knee.  PT PLACED ON EMPIRIC IV ABX   RIGHT FOOT WITH EDEMA AND ERYTHEMA    BLOOD CX DRAWN  PENDING  AGREE WITH MRI  CAN CONTINUE EMPIRIC ABX WILL FOLLOWUP BLOOD CX  KEEP FOOT ELEVATED WARM SOAKS  WOULD CHECK URIC ACID FOR ? GOUT  CONSIDER  PODIATRY AND VASC EVALUATION   WILL FOLLOW UP WITH FURTHER RECOMMENDATIONS

## 2021-01-22 NOTE — PROGRESS NOTE ADULT - ATTENDING COMMENTS
patient seen and eval. agree with above.   a/w iv abxs   need MRI right foot   podiatry consult   ID consult noted and appreciated

## 2021-01-22 NOTE — PROGRESS NOTE ADULT - ASSESSMENT
80F with a history of diabetes, peripheral vascular disease, COPD, hypothyroidism, coronary artery disease, and hypertension who developed toe pain and foot swelling after an abrasion to the third toe.    Cellulitis - To continue on intravenous antibiotics. Right lower extremity doppler was without deep venous thrombosis. Xray noted soft tissue swelling.  Podiatry consultation noted. Infectious Disease consultation appreciated.  Right foot MRI pending.    Diabetes - Insulin coverage, close monitoring of blood glucose levels. A1c 7.7.    Coronary artery disease - On aspirin, ranolazine, and atorvastatin.    Peripheral vascular disease - On cilostazol.    Hypertension - On lisinopril and metoprolol. Close blood pressure monitoring.    COPD - No respiratory symptoms or hypoxia on examination.    Hypothyroidism - On levothyroxine.    Hypercalcemia - The patient is noted to be taking calcium supplements at home. Improved today, monitor BMP. 80F with a history of diabetes, peripheral vascular disease, COPD, hypothyroidism, coronary artery disease, and hypertension who developed toe pain and foot swelling after an abrasion to the third toe.    Cellulitis - To continue on intravenous antibiotics. Right lower extremity doppler was without deep venous thrombosis. Xray noted soft tissue swelling.  Podiatry consultation noted. Infectious Disease consultation appreciated.  Right foot MRI pending.    Diabetes type 2  - Insulin coverage, close monitoring of blood glucose levels. A1c 7.7.    Coronary artery disease - On aspirin, ranolazine, and atorvastatin.    Peripheral vascular disease - On cilostazol.    Hypertension - On lisinopril and metoprolol. Close blood pressure monitoring.    COPD - No respiratory symptoms or hypoxia on examination.    Hypothyroidism - On levothyroxine.    Hypercalcemia - The patient is noted to be taking calcium supplements at home. Improved today, monitor BMP.    dvt ppx: sq lovenox     dispo: pending mri foot / cxs

## 2021-01-23 NOTE — PHARMACOTHERAPY INTERVENTION NOTE - COMMENTS
Pharmacy-Directed Vancomycin Dosing   Pt on 500 mG q12h for cellulitis. Level scheduled 1/23 @ 7am pending collection. Rescheduled for 1/23 prior to 8pm dose as a pre-5th level.
level prior to 8pm dose on 1/23 missed, rescheduled for 1/24 @ 7am prior to 8am dose

## 2021-01-23 NOTE — PROGRESS NOTE ADULT - ASSESSMENT
80 female with Diabetes, peripheral vascular disease, COPD, hypothyroidism, coronary artery disease, and hypertension admitted following failure to improve on oral antibiotics after developing swelling and erythema to her right foot following an abrasion while at her podiatrist of her 3rd toe.     Plan  Cellulitis :  Improving on IV antibiotics.  Continue Vancomycin 500 mg Q 12   ID recommendations appreciated  MRI with suspicion of reactive arthritis less likely OM  Podiatry paged. Awaiting response.    Type 2 DM  Continue insulin s/s     CAD  Continue ASA 81 mg daily  Ranolazine 500mg BID  Atorvastatin 80 mg QHS    Peripheral vascular disease   Continue cilostazol 100mg Q12    Hypertension  Lisinopril 40 mg daily  Metoprolol 100g BID    COPD  Stable    Hypothyroidism  levothyroxine 88mcg daily    Hypercalcemia  Resolved    DVT ppx : Lovenox    Dispo : Home in 1-2 days pending improvement and transition to oral abx

## 2021-01-24 NOTE — PROGRESS NOTE ADULT - SUBJECTIVE AND OBJECTIVE BOX
INFECTIOUS DISEASES AND INTERNAL MEDICINE at [place]  =======================================================  Shabbir Laurent MD  Diplomates American Board of Internal Medicine and Infectious Diseases  Telephone [phone]  Fax            [phone]  =======================================================    JAMAAL HERR [id number]    Follow up: RIGHT FOOT CELLULTIS    Allergies:  No Known Allergies      Medications:  acetaminophen   Tablet .. 650 milliGRAM(s) Oral every 6 hours PRN  aspirin enteric coated 325 milliGRAM(s) Oral daily  atorvastatin 80 milliGRAM(s) Oral at bedtime  cilostazol 100 milliGRAM(s) Oral every 12 hours  dextrose 40% Gel 15 Gram(s) Oral once  dextrose 5%. 1000 milliLiter(s) IV Continuous <Continuous>  dextrose 5%. 1000 milliLiter(s) IV Continuous <Continuous>  dextrose 50% Injectable 25 Gram(s) IV Push once  dextrose 50% Injectable 12.5 Gram(s) IV Push once  dextrose 50% Injectable 25 Gram(s) IV Push once  enoxaparin Injectable 40 milliGRAM(s) SubCutaneous daily  fenofibrate Tablet 145 milliGRAM(s) Oral daily  glucagon  Injectable 1 milliGRAM(s) IntraMuscular once  insulin lispro (ADMELOG) corrective regimen sliding scale   SubCutaneous three times a day before meals  levothyroxine 88 MICROGram(s) Oral daily  lisinopril 40 milliGRAM(s) Oral daily  metoprolol succinate  milliGRAM(s) Oral every 12 hours  oxyCODONE    IR 5 milliGRAM(s) Oral every 8 hours PRN  ranolazine 500 milliGRAM(s) Oral two times a day  vancomycin  IVPB 500 milliGRAM(s) IV Intermittent every 12 hours    SOCIAL       FAMILY   FAMILY HISTORY:  Family history of liver cancer (Mother)    Family history of stomach cancer (Father)      REVIEW OF SYSTEMS:  CONSTITUTIONAL:  No Fever or chills  HEENT:   No diplopia or blurred vision.  No earache, sore throat or runny nose.  CARDIOVASCULAR:  No pressure, squeezing, strangling, tightness, heaviness or aching about the chest, neck, axilla or epigastrium.  RESPIRATORY:  No cough, shortness of breath, PND or orthopnea.  GASTROINTESTINAL:  No nausea, vomiting or diarrhea.  GENITOURINARY:  No dysuria, frequency or urgency. No Blood in urine  MUSCULOSKELETAL: AS PER HPI  SKIN:   AS PER HPI  NEUROLOGIC:  No paresthesias, fasciculations, seizures or weakness.  PSYCHIATRIC:  No disorder of thought or mood.  ENDOCRINE:  No heat or cold intolerance, polyuria or polydipsia.  HEMATOLOGICAL:  No easy bruising or bleeding.            Physical Exam:  ICU Vital Signs Last 24 Hrs  T(C): 36.3 (22 Jan 2021 08:28), Max: 36.8 (21 Jan 2021 18:49)  T(F): 97.4 (22 Jan 2021 08:28), Max: 98.2 (21 Jan 2021 18:49)  HR: 77 (22 Jan 2021 08:28) (75 - 90)  BP: 155/72 (22 Jan 2021 08:28) (128/65 - 155/72)  BP(mean): --  ABP: --  ABP(mean): --  RR: 18 (22 Jan 2021 08:28) (18 - 19)  SpO2: 95% (22 Jan 2021 08:28) (92% - 96%)    GEN: NAD,   HEENT: normocephalic and atraumatic. EOMI. ANILA.    NECK: Supple. No carotid bruits.  No lymphadenopathy or thyromegaly.  LUNGS: Clear to auscultation.  HEART: Regular rate and rhythm without murmur.  ABDOMEN: Soft, nontender, and nondistended.  Positive bowel sounds.    : No CVA tenderness  EXTREMITIES: RIGHT FOOT MILD EDEMA AND ERYTHEMA AND TENDER TO TOUCH.  MSK: no joint swelling  NEUROLOGIC: Cranial nerves II through XII are grossly intact.  PSYCHIATRIC: Appropriate affect .  SKIN: No ulceration or induration present.        Labs:  Vitals:  ============  T(F): 97.4 (22 Jan 2021 08:28), Max: 98.2 (21 Jan 2021 18:49)  HR: 77 (22 Jan 2021 08:28)  BP: 155/72 (22 Jan 2021 08:28)  RR: 18 (22 Jan 2021 08:28)  SpO2: 95% (22 Jan 2021 08:28) (92% - 96%)  temp max in last 48H T(F): , Max: 98.4 (01-21-21 @ 10:52)    =======================================================  Current Antibiotics:  vancomycin  IVPB 500 milliGRAM(s) IV Intermittent every 12 hours    Other medications:  aspirin enteric coated 325 milliGRAM(s) Oral daily  atorvastatin 80 milliGRAM(s) Oral at bedtime  cilostazol 100 milliGRAM(s) Oral every 12 hours  dextrose 40% Gel 15 Gram(s) Oral once  dextrose 5%. 1000 milliLiter(s) IV Continuous <Continuous>  dextrose 5%. 1000 milliLiter(s) IV Continuous <Continuous>  dextrose 50% Injectable 25 Gram(s) IV Push once  dextrose 50% Injectable 12.5 Gram(s) IV Push once  dextrose 50% Injectable 25 Gram(s) IV Push once  enoxaparin Injectable 40 milliGRAM(s) SubCutaneous daily  fenofibrate Tablet 145 milliGRAM(s) Oral daily  glucagon  Injectable 1 milliGRAM(s) IntraMuscular once  insulin lispro (ADMELOG) corrective regimen sliding scale   SubCutaneous three times a day before meals  levothyroxine 88 MICROGram(s) Oral daily  lisinopril 40 milliGRAM(s) Oral daily  metoprolol succinate  milliGRAM(s) Oral every 12 hours  ranolazine 500 milliGRAM(s) Oral two times a day      =======================================================  Labs:                        13.2   10.78 )-----------( 384      ( 22 Jan 2021 08:16 )             42.2      01-22    135  |  102  |  25.0<H>  ----------------------------<  184<H>  4.8   |  17.0<L>  |  1.14    Ca    10.0      22 Jan 2021 08:16    TPro  7.0  /  Alb  4.0  /  TBili  0.3<L>  /  DBili  x   /  AST  17  /  ALT  15  /  AlkPhos  53  01-20      Creatinine, Serum: 1.14 mg/dL (01-22-21 @ 08:16)  Creatinine, Serum: 1.02 mg/dL (01-20-21 @ 14:11)          C-Reactive Protein, Serum: 0.64 mg/dL (01-20-21 @ 14:11)    WBC Count: 10.78 K/uL (01-22-21 @ 08:16)  WBC Count: 10.13 K/uL (01-20-21 @ 14:11)      COVID-19 PCR: NotDetec (01-20-21 @ 20:28)      Alkaline Phosphatase, Serum: 53 U/L (01-20-21 @ 14:11)  Alanine Aminotransferase (ALT/SGPT): 15 U/L (01-20-21 @ 14:11)  Aspartate Aminotransferase (AST/SGOT): 17 U/L (01-20-21 @ 14:11)  Bilirubin Total, Serum: 0.3 mg/dL (01-20-21 @ 14:11)  
Podiatry interval HPI: Pt was seen at bed side, improved erythema and edema. stable for dc on oral abx from podiatry stand point to follow Dr Nath with in one week of dc. Pt states she has pain in R 3rd toe which she stubbed last night.    S : 80y year old Female seen at bedside for Right foot 3rd toe abrasion and cellulitis.  Patient relates to having the abrasion since Monday and redness, swelling and edema for 1 week. Pt saw Dr Steinberg (her podiatrist) monday who trimmed her nails and gave her azithromycin for cellulitis. Pt states she did not see much improvement after taking abx and started having pain and sensitivity to her right foot. Pt denies any trauma or any other foot related complaint.  Patient admits to  (-) Fevers, (-) Chills, (-) Nausea, (-) Vomiting, (-) Shortness of Breath      PMH: DM  PSH:    Allergies:No Known Allergies      Labs:                        11.7   8.61  )-----------( 333      ( 23 Jan 2021 08:00 )             36.7   01-23    136  |  102  |  33.0<H>  ----------------------------<  158<H>  4.6   |  24.0  |  1.12    Ca    9.5      23 Jan 2021 08:00    Vital Signs Last 24 Hrs  T(C): 36.8 (23 Jan 2021 16:11), Max: 36.8 (23 Jan 2021 16:11)  T(F): 98.3 (23 Jan 2021 16:11), Max: 98.3 (23 Jan 2021 16:11)  HR: 99 (23 Jan 2021 16:11) (73 - 99)  BP: 123/53 (23 Jan 2021 16:11) (114/64 - 139/60)  BP(mean): --  RR: 18 (23 Jan 2021 16:11) (18 - 20)  SpO2: 94% (23 Jan 2021 16:11) (92% - 94%)                          O:   General: Pleasant  female NAD & AOX3.    Integument:  Skin warm, dry and supple bilateral.    Abrasion superficial Right 3rd toe and fissure R heel, - hyperkeratotic border,  + edema, + joanna-wound erythema, - purulence, - fluctuance, - tracking/tunneling, - probe to bone.   Vascular: Dorsalis Pedis and Posterior Tibial pulses 1/4.  Capillary re-fill time less then 3 seconds digits 1-5 bilateral.    Neuro: Protective sensation diminished to the level of the digits bilateral.  MSK: Muscle strength 5/5 all major muscle groups bilateral.  Deformity:  A: Right foot cellulitis      P:   Chart reviewed and Patient evaluated  Discussed diagnosis and treatment with patient  Applied betadine with dry sterile dressing and ACE  X-rays reviewed neg  stable for dc on oral abx from podiatry stand point to follow Dr Nath with in one week of dc.  Weight bearing full  Offloading to bilateral Heels.   Discussed importance of daily foot examinations and proper shoe gear and to importance of lower Fasting Blood Glucose levels.   Podiatry will follow while in house.  Discussed with Attending Dr Nath
CC: Right foot pain    HPI:  80F who presented with right foot pain and swelling.     INTERVAL HPI/OVERNIGHT EVENTS:  Patient seen and examined sitting up in the chair.  Patient complaining of Right foot pain, improved after wrapping removed overnight.  Patient denies any headache, dizziness, SOB, CP, abdominal pain, nausea, vomiting, dysuria.  Other ROS reviewed and are negative.    Vital Signs Last 24 Hrs  T(C): 36.3 (22 Jan 2021 08:28), Max: 36.8 (21 Jan 2021 18:49)  T(F): 97.4 (22 Jan 2021 08:28), Max: 98.2 (21 Jan 2021 18:49)  HR: 77 (22 Jan 2021 08:28) (75 - 90)  BP: 155/72 (22 Jan 2021 08:28) (128/65 - 155/72)  BP(mean): --  RR: 18 (22 Jan 2021 08:28) (18 - 19)  SpO2: 95% (22 Jan 2021 08:28) (92% - 96%)  I&O's Detail      PHYSICAL EXAM:  GENERAL: NAD  HEAD:  Atraumatic, Normocephalic  NECK: Supple, No JVD, Normal thyroid  NERVOUS SYSTEM:  Alert & Oriented X3, Good concentration; Motor Strength 5/5 B/L upper and lower extremities  CHEST/LUNG: Clear to auscultation bilaterally; No rales, rhonchi, wheezing, or rubs  HEART: Regular rate and rhythm; No murmurs, rubs, or gallops  ABDOMEN: Soft, Nontender, Nondistended; Bowel sounds present  EXTREMITIES:  2+ Peripheral Pulses, Right foot with erythema and swelling and tenderness                            13.2   10.78 )-----------( 384      ( 22 Jan 2021 08:16 )             42.2     22 Jan 2021 08:16    135    |  102    |  25.0   ----------------------------<  184    4.8     |  17.0   |  1.14     Ca    10.0       22 Jan 2021 08:16        CAPILLARY BLOOD GLUCOSE  POCT Blood Glucose.: 192 mg/dL (22 Jan 2021 12:16)  POCT Blood Glucose.: 180 mg/dL (22 Jan 2021 08:46)          MEDICATIONS  (STANDING):  aspirin enteric coated 325 milliGRAM(s) Oral daily  atorvastatin 80 milliGRAM(s) Oral at bedtime  cilostazol 100 milliGRAM(s) Oral every 12 hours  dextrose 40% Gel 15 Gram(s) Oral once  dextrose 5%. 1000 milliLiter(s) (50 mL/Hr) IV Continuous <Continuous>  dextrose 5%. 1000 milliLiter(s) (100 mL/Hr) IV Continuous <Continuous>  dextrose 50% Injectable 25 Gram(s) IV Push once  dextrose 50% Injectable 12.5 Gram(s) IV Push once  dextrose 50% Injectable 25 Gram(s) IV Push once  enoxaparin Injectable 40 milliGRAM(s) SubCutaneous daily  fenofibrate Tablet 145 milliGRAM(s) Oral daily  glucagon  Injectable 1 milliGRAM(s) IntraMuscular once  insulin lispro (ADMELOG) corrective regimen sliding scale   SubCutaneous three times a day before meals  levothyroxine 88 MICROGram(s) Oral daily  lisinopril 40 milliGRAM(s) Oral daily  metoprolol succinate  milliGRAM(s) Oral every 12 hours  ranolazine 500 milliGRAM(s) Oral two times a day  vancomycin  IVPB 500 milliGRAM(s) IV Intermittent every 12 hours    MEDICATIONS  (PRN):  acetaminophen   Tablet .. 650 milliGRAM(s) Oral every 6 hours PRN Moderate Pain (4 - 6)  oxyCODONE    IR 5 milliGRAM(s) Oral every 8 hours PRN Severe Pain (7 - 10)      RADIOLOGY & ADDITIONAL TESTS:  < from: Xray Foot AP + Lateral + Oblique, Right (01.20.21 @ 16:55) >   EXAM:  FOOT-RIGHT                          PROCEDURE DATE:  01/20/2021          INTERPRETATION:  RIGHT foot    CLINICAL INFORMATION:Injury with  Pain.    TECHNIQUE: AP,lateral and oblique views.    FINDINGS:  There is a RIGHT third digit distal soft tissue swelling with bandage artifacts. Underlying osseous structures intact. Remaining osseous and joint structures unremarkable.  /.    IMPRESSION:        Third digit distal soft tissue swelling with bandage artifact. Osseous structures intact            KAYLYNN LUCERO MD; Attending Radiologist  This document has been electronically signed. Jan 20 2021  5:20PM    < end of copied text >      < from: US Duplex Venous Lower Ext Ltd, Right (01.20.21 @ 15:06) >   EXAM:  US DPLX LWR EXT VEINS LTD RT                          PROCEDURE DATE:  01/20/2021          INTERPRETATION:  CLINICAL INFORMATION: Swelling, right lower extremity swelling    COMPARISON: 7/6/2017    TECHNIQUE: Duplex sonography of the RIGHT LOWER extremity veins with color and spectral Doppler, with and without compression.    FINDINGS:    There is normal compressibility of the right common femoral, femoral and popliteal veins.  The contralateral common femoral vein is patent.  Doppler examination shows normal spontaneous and phasic flow.    No calf vein thrombosis is detected.    IMPRESSION:  No evidence of right lower extremity deep venous thrombosis.                OLI LOPEZ MD; Attending Radiologist  This document has been electronically signed. Jan 20 2021  3:07PM    < end of copied text >  
INFECTIOUS DISEASES AND INTERNAL MEDICINE at [place]  =======================================================  Shabbir Laurent MD  Diplomates American Board of Internal Medicine and Infectious Diseases  Telephone [phone]  Fax            [phone]  =======================================================    JAMAAL HERR [id number]  Follow up: RIGHT FOOT CELLULTIS    still with some tenderness in the right foot.       Allergies:  No Known Allergies      REVIEW OF SYSTEMS:  CONSTITUTIONAL:  No Fever or chills  HEENT:   No diplopia or blurred vision.  No earache, sore throat or runny nose.  CARDIOVASCULAR:  No pressure, squeezing, strangling, tightness, heaviness or aching about the chest, neck, axilla or epigastrium.  RESPIRATORY:  No cough, shortness of breath, PND or orthopnea.  GASTROINTESTINAL:  No nausea, vomiting or diarrhea.  GENITOURINARY:  No dysuria, frequency or urgency. No Blood in urine  MUSCULOSKELETAL: AS PER HPI  SKIN:   AS PER HPI  NEUROLOGIC:  No paresthesias, fasciculations, seizures or weakness.  PSYCHIATRIC:  No disorder of thought or mood.  ENDOCRINE:  No heat or cold intolerance, polyuria or polydipsia.  HEMATOLOGICAL:  No easy bruising or bleeding.       Physical Exam:    GEN: NAD,   HEENT: normocephalic and atraumatic. EOMI. ANILA.    NECK: Supple. No carotid bruits.  No lymphadenopathy or thyromegaly.  LUNGS: Clear to auscultation.  HEART: Regular rate and rhythm without murmur.  ABDOMEN: Soft, nontender, and nondistended.  Positive bowel sounds.    : No CVA tenderness  EXTREMITIES: RIGHT FOOT MILD EDEMA AND ERYTHEMA AND MILDLY TENDER TO TOUCH.  LESS erythema  MSK: no joint swelling  NEUROLOGIC: Cranial nerves II through XII are grossly intact.  PSYCHIATRIC: Appropriate affect .  SKIN: No ulceration or induration present.      Vitals:  ============  T(F): 97.8 (24 Jan 2021 08:46), Max: 98.3 (23 Jan 2021 16:11)  HR: 88 (24 Jan 2021 08:46)  BP: 117/62 (24 Jan 2021 08:46)  RR: 18 (24 Jan 2021 08:46)  SpO2: 90% (24 Jan 2021 08:46) (90% - 94%)  temp max in last 48H T(F): , Max: 98.3 (01-23-21 @ 16:11)    =======================================================  Current Antibiotics:  vancomycin  IVPB 750 milliGRAM(s) IV Intermittent once    Other medications:  aspirin enteric coated 325 milliGRAM(s) Oral daily  atorvastatin 80 milliGRAM(s) Oral at bedtime  cilostazol 100 milliGRAM(s) Oral every 12 hours  dextrose 40% Gel 15 Gram(s) Oral once  dextrose 5%. 1000 milliLiter(s) IV Continuous <Continuous>  dextrose 5%. 1000 milliLiter(s) IV Continuous <Continuous>  dextrose 50% Injectable 25 Gram(s) IV Push once  dextrose 50% Injectable 12.5 Gram(s) IV Push once  dextrose 50% Injectable 25 Gram(s) IV Push once  enoxaparin Injectable 40 milliGRAM(s) SubCutaneous daily  fenofibrate Tablet 145 milliGRAM(s) Oral daily  glucagon  Injectable 1 milliGRAM(s) IntraMuscular once  insulin lispro (ADMELOG) corrective regimen sliding scale   SubCutaneous three times a day before meals  levothyroxine 88 MICROGram(s) Oral daily  lisinopril 40 milliGRAM(s) Oral daily  metoprolol succinate  milliGRAM(s) Oral every 12 hours  ranolazine 500 milliGRAM(s) Oral two times a day      =======================================================  Labs:                        11.6   8.08  )-----------( 345      ( 24 Jan 2021 07:55 )             37.2      01-24    138  |  103  |  38.0<H>  ----------------------------<  188<H>  4.6   |  21.0<L>  |  1.34<H>    Ca    9.7      24 Jan 2021 07:55        Culture - Blood (collected 01-20-21 @ 14:12)  Source: .Blood Blood-Peripheral    Culture - Blood (collected 01-20-21 @ 14:11)  Source: .Blood Blood-Peripheral      Creatinine, Serum: 1.34 mg/dL (01-24-21 @ 07:55)  Creatinine, Serum: 1.12 mg/dL (01-23-21 @ 08:00)  Creatinine, Serum: 1.14 mg/dL (01-22-21 @ 08:16)  Creatinine, Serum: 1.02 mg/dL (01-20-21 @ 14:11)           C-Reactive Protein, Serum: 0.64 mg/dL (01-20-21 @ 14:11)    WBC Count: 8.08 K/uL (01-24-21 @ 07:55)  WBC Count: 8.61 K/uL (01-23-21 @ 08:00)  WBC Count: 10.78 K/uL (01-22-21 @ 08:16)  WBC Count: 10.13 K/uL (01-20-21 @ 14:11)      COVID-19 IgG Antibody Index: <0.10 Index (01-22-21 @ 13:16)  COVID-19 IgG Antibody Interpretation: Negative (01-22-21 @ 13:16)  COVID-19 PCR: NotDetec (01-20-21 @ 20:28)      Alkaline Phosphatase, Serum: 53 U/L (01-20-21 @ 14:11)  Alanine Aminotransferase (ALT/SGPT): 15 U/L (01-20-21 @ 14:11)  Aspartate Aminotransferase (AST/SGOT): 17 U/L (01-20-21 @ 14:11)  Bilirubin Total, Serum: 0.3 mg/dL (01-20-21 @ 14:11)  
[facility] Division of Hospital Medicine    Chief Complaint:  Foot pain / Cellulitis     SUBJECTIVE / OVERNIGHT EVENTS:     Overnight pt reports that she bumped her foot which resulted in bleeding from already injured toe.   Denies fevers or chills. Notes significant improvement in erythema over right foot   Patient denies chest pain, SOB, abd pain, N/V, fever, chills, dysuria or any other complaints. All remainder ROS negative.       Brief hospital course   80 year old female initially presenting with increasing pain and swelling which developed after an abrasion while having her nails clipped byu her podiatrist. Initialy started on oral antimicrobials which were ineffective and subsequent admitted for IV abx.   MRI imaging obtained to evaluate for possible OM.        MEDICATIONS  (STANDING):  aspirin enteric coated 325 milliGRAM(s) Oral daily  atorvastatin 80 milliGRAM(s) Oral at bedtime  cilostazol 100 milliGRAM(s) Oral every 12 hours  dextrose 40% Gel 15 Gram(s) Oral once  dextrose 5%. 1000 milliLiter(s) (50 mL/Hr) IV Continuous <Continuous>  dextrose 5%. 1000 milliLiter(s) (100 mL/Hr) IV Continuous <Continuous>  dextrose 50% Injectable 25 Gram(s) IV Push once  dextrose 50% Injectable 12.5 Gram(s) IV Push once  dextrose 50% Injectable 25 Gram(s) IV Push once  enoxaparin Injectable 40 milliGRAM(s) SubCutaneous daily  fenofibrate Tablet 145 milliGRAM(s) Oral daily  glucagon  Injectable 1 milliGRAM(s) IntraMuscular once  insulin lispro (ADMELOG) corrective regimen sliding scale   SubCutaneous three times a day before meals  levothyroxine 88 MICROGram(s) Oral daily  lisinopril 40 milliGRAM(s) Oral daily  metoprolol succinate  milliGRAM(s) Oral every 12 hours  ranolazine 500 milliGRAM(s) Oral two times a day  vancomycin  IVPB 500 milliGRAM(s) IV Intermittent every 12 hours    MEDICATIONS  (PRN):  acetaminophen   Tablet .. 650 milliGRAM(s) Oral every 6 hours PRN Moderate Pain (4 - 6)        I&O's Summary      PHYSICAL EXAM:  Vital Signs Last 24 Hrs  T(C): 36.7 (23 Jan 2021 04:21), Max: 36.8 (22 Jan 2021 16:02)  T(F): 98.1 (23 Jan 2021 04:21), Max: 98.2 (22 Jan 2021 16:02)  HR: 73 (23 Jan 2021 04:21) (73 - 93)  BP: 114/64 (23 Jan 2021 04:21) (114/64 - 146/66)  BP(mean): --  RR: 20 (23 Jan 2021 04:21) (18 - 20)  SpO2: 92% (23 Jan 2021 04:21) (92% - 93%)        CONSTITUTIONAL: NAD, elderly well-developed, well-groomed  ENMT: Moist oral mucosa, no pharyngeal injection or exudates; normal dentition  RESPIRATORY: Normal respiratory effort; lungs are clear to auscultation bilaterally  CARDIOVASCULAR: Regular rate and rhythm, normal S1 and S2, no murmur/rub/gallop; No lower extremity edema; Peripheral pulses are 2+ bilaterally  ABDOMEN: Nontender to palpation, normoactive bowel sounds, no rebound/guarding; No hepatosplenomegaly  MUSCLOSKELETAL:  RLE with improved erythema and swelling, significant tenderness over 3 toe (PIP/DIP)  PSYCH: A+O to person, place, and time; affect appropriate  NEUROLOGY: CN 2-12 are intact and symmetric; no gross sensory deficits;   SKIN: No rashes; no palpable lesions    LABS:                        11.7   8.61  )-----------( 333      ( 23 Jan 2021 08:00 )             36.7     01-23    136  |  102  |  33.0<H>  ----------------------------<  158<H>  4.6   |  24.0  |  1.12    Ca    9.5      23 Jan 2021 08:00                Culture - Blood (collected 20 Jan 2021 14:12)  Source: .Blood Blood-Peripheral  Preliminary Report (22 Jan 2021 15:00):    No growth at 48 hours    Culture - Blood (collected 20 Jan 2021 14:11)  Source: .Blood Blood-Peripheral  Preliminary Report (22 Jan 2021 15:00):    No growth at 48 hours      CAPILLARY BLOOD GLUCOSE      POCT Blood Glucose.: 174 mg/dL (23 Jan 2021 12:13)  POCT Blood Glucose.: 163 mg/dL (23 Jan 2021 07:55)  POCT Blood Glucose.: 210 mg/dL (22 Jan 2021 21:50)  POCT Blood Glucose.: 177 mg/dL (22 Jan 2021 17:13)        RADIOLOGY & ADDITIONAL TESTS:  Results Reviewed: Improved leucocytosis   Imaging Personally Reviewed:    MRI 1/22  Impression:  Diffuse cellulitis overlying the dorsum of the foot. No drainable fluid collection. There is questionable faint enhancement versus inhomogeneous fat suppression within the middle phalanges of the second and third toes. There is no corresponding osseous edema or T1 marrow signal alteration on precontrast images. Differential considerations include artifact, reactive osteitis, or less likely early osteomyelitis.

## 2021-01-24 NOTE — DISCHARGE NOTE PROVIDER - CARE PROVIDER_API CALL
Danielle Nath (DPM)  Podiatric Medicine and Surgery  [address] [place] [address]  Phone: [phone]  Fax: [phone]  Follow Up Time: 1 week

## 2021-01-24 NOTE — PROGRESS NOTE ADULT - ASSESSMENT
80F who presented yesterday with right foot pain and swelling. She reported that she developed an abrasion to the third toe when her podiatrist was trimming her toenail. She had taken the antibiotics that were prescribed to her without improvement and presented to the hospital for evaluation. The patient was evaluated by the emergency department and antibiotics were initiated. The patient was place under observation status. Her symptoms did not improve and the patient was thought to require admission to the hospital. The patient reported that she had continued pain at the toe since the injury and developed swelling to the foot and pain in the heel which limited her ability to stand or walk. She denied any associated fevers, chills, or discharge from the toe. She denied any similar episodes in the past and did not have a history of foot infection in the past. She also admitted to a prior fall with injury to the left knee which required aspiration of fluid from the knee.  PT PLACED ON EMPIRIC IV ABX   RIGHT FOOT WITH EDEMA AND ERYTHEMA    BLOOD CX DRAWN  PENDING  AGREE WITH MRI    blood cultures negative  renal function declining slowly    change vanco to ONCE DAILY      CONSIDER  PODIATRY AND VASC EVALUATION       can send home on KEFLEX BID

## 2021-01-24 NOTE — DISCHARGE NOTE PROVIDER - HOSPITAL COURSE
HPI  80 female with Diabetes, peripheral vascular disease, COPD, hypothyroidism, coronary artery disease, and hypertension admitted following failure to improve on oral antibiotics after developing swelling and erythema to her right foot following an abrasion while at her podiatrist of her 3rd toe.     Brief Hospital course:   Following admission for the  RLE cellulitis the pt was evaluated by ID and underwent an MRI after being started on IV Vancomycin. Her MR imaging was not suggestive of acute osteomyelitis and with rapuid ersolution of erythema and swelling she has been evaluated by podiatry and deemed clinically stable for discharge with f/u with podiatry.     Imaging :  1/22/21 MRI Right foot w/ IV contrast  Diffuse cellulitis overlying the dorsum of the foot. No drainable fluid collection. There is questionable faint enhancement versus inhomogeneous fat suppression within the middle phalanges of the second and third toes. There is no corresponding osseous edema or T1 marrow signal alteration on precontrast images. Differential considerations include artifact, reactive osteitis, or less likely early osteomyelitis.    1/20/21 Doppler RLE   No evidence of right lower extremity deep venous thrombosis.    1/20/21 R LE XR  Third digit distal soft tissue swelling with bandage artifact. Osseous structures intact    Discharge PE    CONSTITUTIONAL: NAD, elderly well-developed, well-groomed  ENMT: Moist oral mucosa, no pharyngeal injection or exudates; normal dentition  RESPIRATORY: Normal respiratory effort; lungs are clear to auscultation bilaterally  CARDIOVASCULAR: Regular rate and rhythm, normal S1 and S2, no murmur/rub/gallop; No lower extremity edema; Peripheral pulses are 2+ bilaterally  ABDOMEN: Nontender to palpation, normoactive bowel sounds, no rebound/guarding; No hepatosplenomegaly  MUSCLOSKELETAL:  RLE with improved erythema and swelling, significant tenderness over 3 toe (PIP/DIP)  PSYCH: A+O to person, place, and time; affect appropriate  NEUROLOGY: CN 2-12 are intact and symmetric; no gross sensory deficits;   SKIN: No rashes; no palpable lesions        Time spent coordinating discharge > 35 minutes

## 2021-01-24 NOTE — DISCHARGE NOTE PROVIDER - NSDCCPCAREPLAN_GEN_ALL_CORE_FT
PRINCIPAL DISCHARGE DIAGNOSIS  Diagnosis: Diabetic foot ulcer  Assessment and Plan of Treatment:       SECONDARY DISCHARGE DIAGNOSES  Diagnosis: Cellulitis of foot, right  Assessment and Plan of Treatment: Continue antibiotics as prescribed. Keep area dry and clean. Follow up with podiatry  Monitor blood glucose levels lise. Maintain glycemic log to review with your primary care physician

## 2021-01-24 NOTE — DISCHARGE NOTE NURSING/CASE MANAGEMENT/SOCIAL WORK - PATIENT PORTAL LINK FT
You can access the FollowMyHealth Patient Portal offered by [facility] by registering at the following website: [email or web address]. By joining [facility]’s FollowMyHealth portal, you will also be able to view your health information using other applications (apps) compatible with our system.

## 2021-01-24 NOTE — DISCHARGE NOTE PROVIDER - NSDCMRMEDTOKEN_GEN_ALL_CORE_FT
aspirin 325 mg oral delayed release tablet: 1 tab(s) orally once a day  atorvastatin 80 mg oral tablet: 1 tab(s) orally once a day (at bedtime)  cilostazol 100 mg oral tablet: 1 tab(s) orally every 12 hours  fenofibrate 145 mg oral tablet: 1 tab(s) orally once a day  Keflex 500 mg oral capsule: 1 cap(s) orally every 12 hours   levothyroxine 88 mcg (0.088 mg) oral tablet: 1 tab(s) orally once a day  lisinopril 40 mg oral tablet: 1 tab(s) orally once a day  metoprolol succinate 100 mg oral tablet, extended release: 1 tab(s) orally every 12 hours  ranolazine 500 mg oral tablet, extended release: 1 tab(s) orally 2 times a day

## 2021-02-07 NOTE — ED PROVIDER NOTE - OBJECTIVE STATEMENT
PT with SPMHX of DM, PVD, presents to the ED with complaint of Rt foot swelling and redness. Pt states that she has had a gradual onset of pain and swelling over the last 3 days. Pt states that she was recently in [facility] for infection of foot was dx home on PO ABx that she took as RX that she finished 7 days ago. Pt states that she has been having increasing swelling, redness, moderate, dull non radiating, constat ache pain, that is made worse with activity improved by nothing. Pt dines fever, chills, weakness, numbness, loss of sensation, HA< dizziness, SOB, DIff breathing.

## 2021-02-07 NOTE — ED PROVIDER NOTE - PMH
Coronary artery disease, angina presence unspecified, unspecified vessel or lesion type, unspecified whether native or transplanted heart    DM (diabetes mellitus)    Hyperlipidemia, unspecified hyperlipidemia type    Pulmonary emphysema, unspecified emphysema type    PVD (peripheral vascular disease)    Type 2 diabetes mellitus with other specified complication, unspecified whether long term insulin use

## 2021-02-07 NOTE — ED PROVIDER NOTE - CLINICAL SUMMARY MEDICAL DECISION MAKING FREE TEXT BOX
Pt with failed out pt ABx, DM with foot cellulitis, discussed with hospitalist, that wants to have pt admitted for further evaluation and treatment, pt made aware of need for admission and possible further treatments, pt states that they agree with need for admission and they understand all results and possible treatments. PT will be admitted to hospitalist team and care will be transferred to admitting team.

## 2021-02-07 NOTE — ED PROVIDER NOTE - ATTENDING CONTRIBUTION TO CARE
I personally saw the patient with the PA, and completed the key components of the history and physical exam. I then discussed the management plan with the PA.   gen in nad resp clear cardiac no murmur abd soft skin + right foot cellulitus no crepitus no sing nec fasc  agree with pa plan of care

## 2021-02-07 NOTE — ED PROVIDER NOTE - NS ED ROS FT
ROS: CONTUSIONAL: Denies fever, chills, fatigue, wt loss. HEAD: Denies trauma, HA, Dizziness. EYE: Denies Acute visual changes, diplopia. ENMT: Denies change in hearing, tinnitus, epistaxis, difficulty swallowing, sore throat. CARDIO: Denies CP, palpitations, edema. RESP: Denies Cough, SOB , Diff breathing, hemoptysis. GI: Denies N/V, ABD pain, change in bowel movement. URINARY: Denies difficulty urinating, pelvic pain. MS:  Denies joint pain, back pain, weakness, decreased ROM, swelling. NEURO: Denies change in gait, seizures, loss of sensation, dizziness, confusion LOC.  PSY: NO SI/HI. SKIN: +redness, swelling, dines rash.

## 2021-02-07 NOTE — ED ADULT TRIAGE NOTE - CHIEF COMPLAINT QUOTE
\"He was looking kind of sluggish when I got home, all he says is he doesn't feel good. His temperature was 103. \"  Tylenol 5 ml @ 2100. patient states that she was here 2 weeks ago for infection to right foot was treated with ABX, today states swelling redness to foot with possible infection again unable to put pressure on foot

## 2021-02-08 PROBLEM — E11.69 TYPE 2 DIABETES MELLITUS WITH OTHER SPECIFIED COMPLICATION: Chronic | Status: ACTIVE | Noted: 2021-01-01

## 2021-02-08 PROBLEM — J43.9 EMPHYSEMA, UNSPECIFIED: Chronic | Status: ACTIVE | Noted: 2021-01-01

## 2021-02-08 PROBLEM — E78.5 HYPERLIPIDEMIA, UNSPECIFIED: Chronic | Status: ACTIVE | Noted: 2021-01-01

## 2021-02-08 PROBLEM — I25.10 ATHEROSCLEROTIC HEART DISEASE OF NATIVE CORONARY ARTERY WITHOUT ANGINA PECTORIS: Chronic | Status: ACTIVE | Noted: 2021-01-01

## 2021-02-08 NOTE — H&P ADULT - ASSESSMENT
80F with a history of diabetes, peripheral vascular disease, COPD, hypothyroidism, coronary artery disease, hypertension, recently hospitalized for cellulitis presents with recurrent cellulitis.     #Recurrent Cellulitis  -will start vanc/zosyn  -good pulses  -follow up blood and wound cultures  -not meeting sepsis criteria  -follow up xray  -check esr and crp  -ID consulted  -consult podiatry in am    #Dm2  -will start on lantus 15 qhs  -moderate iss w/ meals and qhs  -adjust accordingly    #CAD/PVD  -c/w aspirin and cilastozol  -c/w atorvastatin and fenofibrate  -good pulses  -no chest pain    #Hypothyroid  -c/w synthroid    #dvt ppx  -lovenox sq

## 2021-02-08 NOTE — H&P ADULT - NSHPPHYSICALEXAM_GEN_ALL_CORE
Vital Signs Last 24 Hrs  T(C): 36.7 (08 Feb 2021 01:48), Max: 36.9 (07 Feb 2021 21:34)  T(F): 98 (08 Feb 2021 01:48), Max: 98.4 (07 Feb 2021 21:34)  HR: 82 (08 Feb 2021 01:48) (82 - 100)  BP: 157/67 (08 Feb 2021 01:48) (157/67 - 188/83)  BP(mean): --  RR: 17 (08 Feb 2021 01:48) (17 - 18)  SpO2: 95% (08 Feb 2021 01:48) (95% - 96%)    GENERAL: NAD, obese  HEENT:  Atraumatic, Normocephalic, MMM, no oropharyngeal lesions  EYES: EOMI, PERRLA, conjunctiva and sclera clear  NECK: Supple, No JVD, no throat tenderness.  CHEST/LUNG: Clear to auscultation bilaterally; No wheezes, rales, or rhonchi  HEART: Regular rate and rhythm; No murmurs, rubs, or gallops  ABDOMEN: Soft, Nontender, Nondistended; Bowel sounds present  EXTREMITIES:  1+ Peripheral Pulses, No edema  PSYCH: AAOx3, normal affect  NEUROLOGY: moves all extremities spontaneously. no sensory deficits  SKIN: multiple abrasions on left dorsal foot with surrounding erythema and edema. deep abrasion on left heal with some surrounding edema, minimal drainage.

## 2021-02-08 NOTE — ED ADULT NURSE NOTE - CHIEF COMPLAINT QUOTE
MARIELENA Beck patient states that she was here 2 weeks ago for infection to right foot was treated with ABX, today states swelling redness to foot with possible infection again unable to put pressure on foot

## 2021-02-08 NOTE — H&P ADULT - NSICDXPASTMEDICALHX_GEN_ALL_CORE_FT
PAST MEDICAL HISTORY:  Coronary artery disease, angina presence unspecified, unspecified vessel or lesion type, unspecified whether native or transplanted heart     DM (diabetes mellitus)     Hyperlipidemia, unspecified hyperlipidemia type     Pulmonary emphysema, unspecified emphysema type     PVD (peripheral vascular disease)     Type 2 diabetes mellitus with other specified complication, unspecified whether long term insulin use

## 2021-02-08 NOTE — H&P ADULT - NSHPLABSRESULTS_GEN_ALL_CORE
12.8   9.70  )-----------( 393      ( 07 Feb 2021 22:35 )             40.2       02-07    139  |  102  |  21.0<H>  ----------------------------<  181<H>  4.9   |  24.0  |  0.87    Ca    10.6<H>      07 Feb 2021 22:35    TPro  7.7  /  Alb  4.0  /  TBili  <0.2<L>  /  DBili  x   /  AST  30  /  ALT  14  /  AlkPhos  61  02-07                  PT/INR - ( 07 Feb 2021 22:35 )   PT: 12.5 sec;   INR: 1.08 ratio         PTT - ( 07 Feb 2021 22:35 )  PTT:26.5 sec    Lactate Trend    CAPILLARY BLOOD GLUCOSE    Culture Results:   No growth at 5 days. (01-20 @ 14:12)  Culture Results:   No growth at 5 days. (01-20 @ 14:11)      RADIOLOGY, EKG & ADDITIONAL TESTS: Reviewed.     xray foot- read pendnig 12.8   9.70  )-----------( 393      ( 07 Feb 2021 22:35 )             40.2       02-07    139  |  102  |  21.0<H>  ----------------------------<  181<H>  4.9   |  24.0  |  0.87    Ca    10.6<H>      07 Feb 2021 22:35    TPro  7.7  /  Alb  4.0  /  TBili  <0.2<L>  /  DBili  x   /  AST  30  /  ALT  14  /  AlkPhos  61  02-07        PT/INR - ( 07 Feb 2021 22:35 )   PT: 12.5 sec;   INR: 1.08 ratio         PTT - ( 07 Feb 2021 22:35 )  PTT:26.5 sec    Lactate Trend    CAPILLARY BLOOD GLUCOSE    Culture Results:   No growth at 5 days. (01-20 @ 14:12)  Culture Results:   No growth at 5 days. (01-20 @ 14:11)      RADIOLOGY, EKG & ADDITIONAL TESTS: Reviewed.     xray foot- reviewed, official read pending

## 2021-02-08 NOTE — PROGRESS NOTE ADULT - SUBJECTIVE AND OBJECTIVE BOX
Patient is a 80y old  Female who presents with a chief complaint of cellulitis (08 Feb 2021 00:03)    Pt seen and exam. c/o rt foot pain  No fever,chill,sob,cp,legs pain  SUBJECTIVE / OVERNIGHT EVENTS:  REVIEW OF SYSTEMS: All systems are reviewed and found to be negative except above    MEDICATIONS  (STANDING):  aspirin enteric coated 325 milliGRAM(s) Oral daily  atorvastatin 80 milliGRAM(s) Oral at bedtime  cilostazol 100 milliGRAM(s) Oral every 12 hours  dextrose 40% Gel 15 Gram(s) Oral once  dextrose 5%. 1000 milliLiter(s) (50 mL/Hr) IV Continuous <Continuous>  dextrose 5%. 1000 milliLiter(s) (100 mL/Hr) IV Continuous <Continuous>  dextrose 50% Injectable 25 Gram(s) IV Push once  dextrose 50% Injectable 12.5 Gram(s) IV Push once  dextrose 50% Injectable 25 Gram(s) IV Push once  enoxaparin Injectable 40 milliGRAM(s) SubCutaneous daily  fenofibrate Tablet 145 milliGRAM(s) Oral daily  glucagon  Injectable 1 milliGRAM(s) IntraMuscular once  insulin glargine Injectable (LANTUS) 15 Unit(s) SubCutaneous at bedtime  insulin lispro (ADMELOG) corrective regimen sliding scale   SubCutaneous three times a day before meals  insulin lispro (ADMELOG) corrective regimen sliding scale   SubCutaneous at bedtime  levothyroxine 88 MICROGram(s) Oral daily  lisinopril 40 milliGRAM(s) Oral daily  metoprolol succinate  milliGRAM(s) Oral daily  piperacillin/tazobactam IVPB.. 3.375 Gram(s) IV Intermittent every 8 hours  ranolazine 500 milliGRAM(s) Oral two times a day  vancomycin  IVPB 750 milliGRAM(s) IV Intermittent every 12 hours    MEDICATIONS  (PRN):      CAPILLARY BLOOD GLUCOSE      POCT Blood Glucose.: 165 mg/dL (08 Feb 2021 11:31)  POCT Blood Glucose.: 170 mg/dL (08 Feb 2021 08:30)  POCT Blood Glucose.: 142 mg/dL (08 Feb 2021 03:10)    I&O's Summary    07 Feb 2021 07:01  -  08 Feb 2021 07:00  --------------------------------------------------------  IN: 550 mL / OUT: 0 mL / NET: 550 mL        PHYSICAL EXAM:  Vital Signs Last 24 Hrs  T(C): 36.6 (08 Feb 2021 11:27), Max: 36.9 (07 Feb 2021 21:34)  T(F): 97.9 (08 Feb 2021 11:27), Max: 98.4 (07 Feb 2021 21:34)  HR: 75 (08 Feb 2021 11:27) (75 - 100)  BP: 146/72 (08 Feb 2021 11:27) (146/72 - 188/83)  BP(mean): --  RR: 17 (08 Feb 2021 01:48) (17 - 18)  SpO2: 92% (08 Feb 2021 11:27) (90% - 96%)    CONSTITUTIONAL: NAD,  EYES: PERRLA; conjunctiva and sclera clear  ENMT: Moist oral mucosa,   RESPIRATORY: Normal respiratory effort; lungs are clear to auscultation bilaterally  CARDIOVASCULAR: Regular rate and rhythm, normal S1 and S2, no murmur   EXTS: No lower extremity edema; Peripheral pulses are 2+ bilaterally. rt foot: erythema, warm mild swelling, skin peeling. no discharge ,no open wound   ABDOMEN: Nontender to palpation, normoactive bowel sounds, no rebound/guarding;   PSYCH: affect appropriate  NEUROLOGY: A+O to person, place, and time; CN 2-12 are intact and symmetric; no gross sensory deficits;       LABS:                        13.0   8.94  )-----------( 371      ( 08 Feb 2021 09:12 )             39.8     02-08    138  |  102  |  18.0  ----------------------------<  177<H>  3.7   |  23.0  |  0.83    Ca    10.1      08 Feb 2021 09:12  Phos  3.1     02-08  Mg     1.6     02-08    TPro  7.2  /  Alb  4.1  /  TBili  0.3<L>  /  DBili  x   /  AST  14  /  ALT  14  /  AlkPhos  63  02-08    PT/INR - ( 07 Feb 2021 22:35 )   PT: 12.5 sec;   INR: 1.08 ratio         PTT - ( 07 Feb 2021 22:35 )  PTT:26.5 sec            RADIOLOGY & ADDITIONAL TESTS:  Results Reviewed:   Imaging Personally Reviewed:  Electrocardiogram Personally Reviewed:    COORDINATION OF CARE:  Care Discussed with Consultants/Other Providers [Y/N]:  Prior or Outpatient Records Reviewed [Y/N]:

## 2021-02-08 NOTE — ED ADULT NURSE NOTE - OBJECTIVE STATEMENT
Pt presents to ED c/o R foot pain. Pt recently in the hospital for same complaint, sent home on PO abx with no improvement. Pt presents now with redness to the foot, and a wound on the tip on the toe. Pt denies other complaints at this time, but is scared and states "please just save my foot."

## 2021-02-08 NOTE — CONSULT NOTE ADULT - ASSESSMENT
80y  Female with h/o diabetes, peripheral vascular disease, COPD, hypothyroidism, coronary artery disease, hypertension. Patient was recently admitted from 1/21 to 1/24 and received vancomycin for cellulitis, was discharged on 1/24/21 on PO Keflex. Patient was doing well up until 3 days prior to presentation when her foot started to have worsening swelling and redness associated with pain. Patient reports her right foot had an abrasion in the beginning of January. Patient also tried to get topical from PCP, but her foot continued to worsen. Denies fever, chills. In ER, patient was afebrile, no leukocytosis received vancomycin and zosyn. Blood and wound cultures taken.       Rt Foot cellulitis   PVD      - Blood cultures pending  - Foot cultures  pending  - Given no fever or leukocytosis, likely this is PVD vs cellulitis  - MRI on prior admission reporting no OM  - COVID 19 PCR negative   - Procalcitonin level ordered  - Continue  Zosyn  - Continue Vancomycin  - Monitor trough  - Monitor for Vancomycin toxicity   - If cultures are negative will D/C antibiotics  - Trend Fever  - Trend Leukocytosis      Will Follow

## 2021-02-08 NOTE — ED ADULT NURSE NOTE - NSIMPLEMENTINTERV_GEN_ALL_ED
Implemented All Fall Risk Interventions:  [place] to call system. Call bell, personal items and telephone within reach. Instruct patient to call for assistance. Room bathroom lighting operational. Non-slip footwear when patient is off stretcher. Physically safe environment: no spills, clutter or unnecessary equipment. Stretcher in lowest position, wheels locked, appropriate side rails in place. Provide visual cue, wrist band, yellow gown, etc. Monitor gait and stability. Monitor for mental status changes and reorient to person, place, and time. Review medications for side effects contributing to fall risk. Reinforce activity limits and safety measures with patient and family.

## 2021-02-08 NOTE — H&P ADULT - NSHPREVIEWOFSYSTEMS_GEN_ALL_CORE
REVIEW OF SYSTEMS:    CONSTITUTIONAL: No weakness, fevers or chills  EYES/ENT: No visual changes;  No vertigo or throat pain   NECK: No pain or stiffness  RESPIRATORY: No cough, wheezing, hemoptysis; No shortness of breath  CARDIOVASCULAR: No chest pain or palpitations  GASTROINTESTINAL: No abdominal or epigastric pain. No nausea, vomiting, or hematemesis; No diarrhea or constipation. No melena or hematochezia.  GENITOURINARY: No dysuria, frequency or hematuria  NEUROLOGICAL: No numbness or weakness  SKIN: see HPI  All other review of systems is negative unless indicated above.

## 2021-02-08 NOTE — H&P ADULT - HISTORY OF PRESENT ILLNESS
80F with a history of diabetes, peripheral vascular disease, COPD, hypothyroidism, coronary artery disease, hypertension, recently hospitalized for cellulitis presents with recurrent cellulitis. Pt stated started having redness and pain on right foot after getting an abrasion in the beginning of January. She was admitted from 1/21 to 1/24 and received vancomycin with good response. She was discharged on Keflex for 7 days. She stated redness and pain initially improved, but came back again 1 week ago after abx completed. She tried to get topical from PCP, but continued to worsen and now returned to hospital. Complains of pain mainly on dorsal foot and heal. Denies fever, chills, chest pain, abdominal pain, N/v/d.     In ER, patient received vancomycin and zosyn. Blood and wound cultures taken.

## 2021-02-08 NOTE — CONSULT NOTE ADULT - SUBJECTIVE AND OBJECTIVE BOX
[facility]  INFECTIOUS DISEASES AND INTERNAL MEDICINE at [place]  =======================================================  Shabbir Laurent MD  Diplomates American Board of Internal Medicine and Infectious Diseases  Tel: [phone]      Fax: [phone]  =======================================================      [id number]  JAMAAL HERR    CC: Patient is a 80y old  Female who presents with a chief complaint of cellulitis (08 Feb 2021 14:10)      80y  Female with h/o diabetes, peripheral vascular disease, COPD, hypothyroidism, coronary artery disease, hypertension. Patient was recently admitted from 1/21 to 1/24 and received vancomycin for cellulitis, was discharged on 1/24/21 on PO Keflex. Patient was doing well up until 3 days prior to presentation when her foot started to have worsening swelling and redness associated with pain. Patient reports her right foot had an abrasion in the beginning of January. Patient also tried to get topical from PCP, but her foot continued to worsen. Denies fever, chills. In ER, patient was afebrile, no leukocytosis received vancomycin and zosyn. Blood and wound cultures taken. ID input requested.       Past Medical & Surgical Hx:  PVD (peripheral vascular disease)  DM (diabetes mellitus)  Type 2 diabetes mellitus with other specified complication, unspecified whether long term insulin use  Hyperlipidemia, unspecified hyperlipidemia type  Coronary artery disease, angina presence unspecified, unspecified vessel or lesion type, unspecified whether native or transplanted heart  Pulmonary emphysema, unspecified emphysema type  No significant past surgical history      Social Hx:  Former smoker       FAMILY HISTORY:  Family history of liver cancer (Mother)  Family history of stomach cancer (Father)  CAD- Mother, Father and Siblings       Allergies  No Known Allergies      REVIEW OF SYSTEMS:  CONSTITUTIONAL:  No Fever or chills  HEENT:  No diplopia or blurred vision.  No earache, sore throat or runny nose.  CARDIOVASCULAR:  No pressure, squeezing, strangling, tightness, heaviness or aching about the chest, neck, axilla or epigastrium.  RESPIRATORY:  No cough, shortness of breath  GASTROINTESTINAL:  No nausea, vomiting or diarrhea.  GENITOURINARY:  No dysuria, frequency or urgency. No Blood in urine  MUSCULOSKELETAL:  no joint aches, no muscle pain  SKIN:  Rt foot redness and swelling  NEUROLOGIC:  No Headaches, seizures  PSYCHIATRIC:  No disorder of thought or mood.  ENDOCRINE:  No heat or cold intolerance  HEMATOLOGICAL:  No easy bruising or bleeding.       Physical Exam:  GEN: NAD, pleasant  HEENT: normocephalic and atraumatic. EOMI. PERRL.  Anicteric  NECK: Supple.   LUNGS: Clear to auscultation.  HEART: Regular rate and rhythm   ABDOMEN: Soft, nontender, and nondistended.  Positive bowel sounds.    : No CVA tenderness  EXTREMITIES: Rt foot redness and swelling, No warmth   MSK: No joint swelling  NEUROLOGIC:  No Focal Deficits  PSYCHIATRIC: Appropriate affect .  SKIN: No Rash    Height (cm): 152.4 (02-07 @ 21:34)  Weight (kg): 88.5 (02-07 @ 21:34)  BMI (kg/m2): 38.1 (02-07 @ 21:34)  BSA (m2): 1.85 (02-07 @ 21:34)      Vitals:  T(F): 97.9 (08 Feb 2021 15:30), Max: 98.4 (07 Feb 2021 21:34)  HR: 75 (08 Feb 2021 15:30)  BP: 137/67 (08 Feb 2021 15:30)  RR: 20 (08 Feb 2021 15:30)  SpO2: 91% (08 Feb 2021 15:30) (90% - 96%)  temp max in last 48H T(F): , Max: 98.4 (02-07-21 @ 21:34)      Current Antibiotics:  piperacillin/tazobactam IVPB.. 3.375 Gram(s) IV Intermittent every 8 hours  vancomycin  IVPB 750 milliGRAM(s) IV Intermittent every 12 hours      Other medications:  aspirin enteric coated 325 milliGRAM(s) Oral daily  atorvastatin 80 milliGRAM(s) Oral at bedtime  cilostazol 100 milliGRAM(s) Oral every 12 hours  dextrose 40% Gel 15 Gram(s) Oral once  dextrose 5%. 1000 milliLiter(s) IV Continuous <Continuous>  dextrose 5%. 1000 milliLiter(s) IV Continuous <Continuous>  dextrose 50% Injectable 25 Gram(s) IV Push once  dextrose 50% Injectable 12.5 Gram(s) IV Push once  dextrose 50% Injectable 25 Gram(s) IV Push once  enoxaparin Injectable 40 milliGRAM(s) SubCutaneous daily  fenofibrate Tablet 145 milliGRAM(s) Oral daily  glucagon  Injectable 1 milliGRAM(s) IntraMuscular once  insulin glargine Injectable (LANTUS) 15 Unit(s) SubCutaneous at bedtime  insulin lispro (ADMELOG) corrective regimen sliding scale   SubCutaneous three times a day before meals  insulin lispro (ADMELOG) corrective regimen sliding scale   SubCutaneous at bedtime  levothyroxine 88 MICROGram(s) Oral daily  lisinopril 40 milliGRAM(s) Oral daily  metoprolol succinate  milliGRAM(s) Oral daily  ranolazine 500 milliGRAM(s) Oral two times a day                 13.0   8.94  )-----------( 371      ( 08 Feb 2021 09:12 )             39.8     02-08    138  |  102  |  18.0  ----------------------------<  177<H>  3.7   |  23.0  |  0.83    Ca    10.1      08 Feb 2021 09:12  Phos  3.1     02-08  Mg     1.6     02-08    TPro  7.2  /  Alb  4.1  /  TBili  0.3<L>  /  DBili  x   /  AST  14  /  ALT  14  /  AlkPhos  63  02-08      WBC Count: 8.94 K/uL (02-08-21 @ 09:12)  WBC Count: 9.70 K/uL (02-07-21 @ 22:35)    Creatinine, Serum: 0.83 mg/dL (02-08-21 @ 09:12)  Creatinine, Serum: 0.87 mg/dL (02-07-21 @ 22:35)    COVID-19 PCR: NotDetec (02-07-21 @ 22:36)  COVID-19 IgG Antibody Index: <0.10 Index (01-22-21 @ 13:16)  COVID-19 IgG Antibody Interpretation: Negative (01-22-21 @ 13:16)  COVID-19 PCR: NotDetec (01-20-21 @ 20:28)        < from: Xray Foot AP + Lateral + Oblique, Right (02.07.21 @ 22:53) >  EXAM:  FOOT-RIGHT                          PROCEDURE DATE:  02/07/2021      INTERPRETATION:  Right foot    HISTORY: Pain    Comparison: 1/20/2021     Three views of the right foot show no evidence of fracture nor destructive change. The joint spaces are maintained. Soft tissue swelling is seen along the dorsum of the foot. Calcification of the plantar fascia is unchanged.    IMPRESSION: Increased soft tissue swelling.    < end of copied text >        < from: MR Foot w/ IV Cont, Right (01.22.21 @ 21:41) >  EXAM:  MR FOOT IC RT                          PROCEDURE DATE:  01/22/2021      INTERPRETATION:  Clinical indication: Right foot cellulitis.    Multiplanar multisequence MRI of the right foot was performed from the level of the toes to the level of the talus with and without intravenous contrast.    9 cc of Gadavist was administered intravenously. 1 cc was discarded.    Correlation is made with prior radiographs of the foot from January 21, 2021.    Findings:    There is extensive subcutaneous edema throughout the dorsum of the foot suggestive of cellulitis. There is no peripherally enhancing fluid collection to suggest abscess.    There is no evidence of acute fracture or osteonecrosis. There is questionable faint enhancement versus inhomogeneous fat suppression within the middle phalanges of the second and third toes. There is no corresponding osseous edema or T1 marrow signal alteration on the precontrast images. Differential considerations include artifact, reactive osteitis, orless likely early osteomyelitis. There is mild hallux valgus with moderate first metatarsal phalangeal and hallux sesamoid joint arthrosis. The remaining joint spaces are preserved.    Visualized tendinous structures are intact without tenosynovitis or tearing. There is minimal feathery edema within the plantar musculature.    Lisfranc ligament is intact.    Impression:    Diffuse cellulitis overlying the dorsum of the foot. No drainable fluid collection. There is questionable faint enhancement versus inhomogeneous fat suppression within the middle phalanges of the second and third toes. There is no corresponding osseous edema or T1 marrow signal alteration on precontrast images. Differential considerations include artifact, reactive osteitis, or less likely early osteomyelitis.    < end of copied text >

## 2021-02-08 NOTE — PROGRESS NOTE ADULT - ASSESSMENT
80F with a history of diabetes, peripheral vascular disease, COPD, hypothyroidism, coronary artery disease, hypertension, recently hospitalized for cellulitis presents with recurrent cellulitis.     #Rt foot cellulitis, Recurrent Cellulitis  -will start vanc/zosyn  -good pulses  -follow up blood and wound cultures  -not meeting sepsis criteria  -follow up xray  -check esr and crp  -ID consulted  -consult podiatry     #Dm2  -will start on lantus 15 qhs  -moderate iss w/ meals and qhs  -adjust accordingly    #CAD/PVD  -c/w aspirin and cilastozol  -c/w atorvastatin and fenofibrate  -good pulses  -no chest pain    #Hypothyroid  -c/w synthroid    #dvt ppx  -lovenox sq    care of plan dw pt. Agreed with above plan

## 2021-02-09 NOTE — PROGRESS NOTE ADULT - SUBJECTIVE AND OBJECTIVE BOX
CC: f/u R foot cellulitis, L knee effusion   INTERVAL HPI/OVERNIGHT EVENTS: Pt seen and examined at bedside this AM by Hospitalist and PA. Pt complaining of pain to right foot only on ambulation. Pt also reports that she fell approx 1 mo ago and had subsequent left knee joint effusions for which she follows up with an outpt orthopedist Dr Warren Lange. Pt states that at last appointment, 15cc was drained and a steroid was injected in left knee. Pt states that since she was admitted to the hospital, she missed her 3 week post drain outpt appointment on 1/26. Pt reports increased swelling and pain to left knee. Denies fluid being infectious. Denies fever, chills, headaches, dizziness, chest pain/pressure, palpitations, shortness of breath, difficulty breathing, abdominal pain, n/v/d or any other complaints.     REVIEW OF SYSTEMS:  CONSTITUTIONAL: No fever, weight loss, or fatigue  RESPIRATORY: No cough, wheezing, chills or hemoptysis; No shortness of breath  CARDIOVASCULAR: No chest pain, palpitations, dizziness, or leg swelling  GASTROINTESTINAL: No abdominal or epigastric pain. No nausea, vomiting or hematemesis; No diarrhea or constipation  NEUROLOGICAL: No headaches, memory loss, loss of strength, numbness, or tremors  SKIN: No itching, burning, rashes, or lesions   MUSCULOSKELETAL: see hpi     Allergies  No Known Allergies    PAST MEDICAL & SURGICAL HISTORY:  PVD (peripheral vascular disease)  DM (diabetes mellitus)  Type 2 diabetes mellitus with other specified complication, unspecified whether long term insulin use  Hyperlipidemia, unspecified hyperlipidemia type  Conary artery disease, angina presence unspecified, unspecified vessel or lesion type, unspecified whether native or transplanted heart  Pulmonary emphysema, unspecified emphysema type  No significant past surgical history    VITAL SIGNS:  T(C): 36.7 (02-09-21 @ 11:15), Max: 37.1 (02-08-21 @ 19:26)  HR: 86 (02-09-21 @ 11:15) (86 - 89)  BP: 116/65 (02-09-21 @ 11:15) (116/65 - 122/69)  RR: 18 (02-09-21 @ 11:15) (18 - 20)  SpO2: 94% (02-09-21 @ 11:15) (93% - 94%)  Wt(kg): --Vital Signs Last 24 Hrs  T(C): 36.7 (09 Feb 2021 11:15), Max: 37.1 (08 Feb 2021 19:26)  T(F): 98 (09 Feb 2021 11:15), Max: 98.7 (08 Feb 2021 19:26)  HR: 86 (09 Feb 2021 11:15) (86 - 89)  BP: 116/65 (09 Feb 2021 11:15) (116/65 - 122/69)  BP(mean): --  RR: 18 (09 Feb 2021 11:15) (18 - 20)  SpO2: 94% (09 Feb 2021 11:15) (93% - 94%)    PHYSICAL EXAM:  GENERAL: Pt sitting in bedside chair,right foot elevated on pillow, sitting comfortably in NAD  HEAD:  Atraumatic  EYES: EOMI, PERRL, conjunctiva and sclera clear  ENT: Moist mucous membranes  NECK: No JVD  CHEST/LUNG: Clear to auscultation bilaterally; No rales, rhonchi, wheezing, or rubs. Unlabored respirations  HEART: S1, S2, Regular rate and rhythm   ABDOMEN: Bowel sounds present; Soft, Nontender, Nondistended. No guarding or rigidity   EXTREMITIES:  2+ Peripheral Pulses, brisk capillary refill. No clubbing, cyanosis. 1+ pitting edema to dorsum of right foot. Left knee with moderate size joint effusion. FROM of all joints; however, limited to left knee due to effusion. No erythema or increased warmth, nontender to palpation   NERVOUS SYSTEM:  Alert & Oriented X3, speech clear, FROM x 4 extremities. No deficits   SKIN: No rashes or lesions    LABS:                     12.2   9.12  )-----------( 326      ( 09 Feb 2021 06:32 )             38.4     02-09  137  |  100  |  24.0<H>  ----------------------------<  151<H>  3.8   |  22.0  |  0.94  Ca    9.6      09 Feb 2021 06:32  Phos  3.1     02-08  Mg     1.6     02-08    TPro  7.2  /  Alb  4.1  /  TBili  0.3<L>  /  DBili  x   /  AST  14  /  ALT  14  /  AlkPhos  63  02-08    PT/INR - ( 07 Feb 2021 22:35 )   PT: 12.5 sec;   INR: 1.08 ratio    PTT - ( 07 Feb 2021 22:35 )  PTT:26.5 sec  CAPILLARY BLOOD GLUCOSE  POCT Blood Glucose.: 186 mg/dL (09 Feb 2021 11:32)  POCT Blood Glucose.: 157 mg/dL (09 Feb 2021 07:33)  POCT Blood Glucose.: 160 mg/dL (08 Feb 2021 21:14)  POCT Blood Glucose.: 152 mg/dL (08 Feb 2021 18:26)    MEDICATIONS  (STANDING):  aspirin enteric coated 325 milliGRAM(s) Oral daily  atorvastatin 80 milliGRAM(s) Oral at bedtime  cilostazol 100 milliGRAM(s) Oral every 12 hours  dextrose 40% Gel 15 Gram(s) Oral once  dextrose 5%. 1000 milliLiter(s) (50 mL/Hr) IV Continuous <Continuous>  dextrose 5%. 1000 milliLiter(s) (100 mL/Hr) IV Continuous <Continuous>  dextrose 50% Injectable 25 Gram(s) IV Push once  dextrose 50% Injectable 12.5 Gram(s) IV Push once  dextrose 50% Injectable 25 Gram(s) IV Push once  enoxaparin Injectable 40 milliGRAM(s) SubCutaneous daily  fenofibrate Tablet 145 milliGRAM(s) Oral daily  glucagon  Injectable 1 milliGRAM(s) IntraMuscular once  insulin glargine Injectable (LANTUS) 15 Unit(s) SubCutaneous at bedtime  insulin lispro (ADMELOG) corrective regimen sliding scale   SubCutaneous three times a day before meals  insulin lispro (ADMELOG) corrective regimen sliding scale   SubCutaneous at bedtime  levothyroxine 88 MICROGram(s) Oral daily  lisinopril 40 milliGRAM(s) Oral daily  metoprolol succinate  milliGRAM(s) Oral daily  piperacillin/tazobactam IVPB.. 3.375 Gram(s) IV Intermittent every 8 hours  ranolazine 500 milliGRAM(s) Oral two times a day  vancomycin  IVPB 750 milliGRAM(s) IV Intermittent every 12 hours    MEDICATIONS  (PRN):  acetaminophen   Tablet .. 650 milliGRAM(s) Oral every 6 hours PRN Temp greater or equal to 38C (100.4F), Mild Pain (1 - 3)     CC: f/u R foot cellulitis, L knee effusion   INTERVAL HPI/OVERNIGHT EVENTS: Pt seen and examined at bedside this AM by Hospitalist and PA. Pt complaining of pain to right foot only on ambulation. Pt also reports that she fell approx 1 mo ago and had subsequent left knee joint effusions for which she follows up with an outpt orthopedist Dr Warren Lange. Pt states that at last appointment, 15cc was drained and a steroid was injected in left knee. Pt states that since she was admitted to the hospital, she missed her 3 week post drain outpt appointment on 1/26. Pt reports increased swelling and pain to left knee. Denies fluid being infectious. Denies fever, chills, headaches, dizziness, chest pain/pressure, palpitations, shortness of breath, difficulty breathing, abdominal pain, n/v/d or any other complaints.     REVIEW OF SYSTEMS:  CONSTITUTIONAL: No fever, weight loss, or fatigue  RESPIRATORY: No cough, wheezing, chills or hemoptysis; No shortness of breath  CARDIOVASCULAR: No chest pain, palpitations, dizziness, or leg swelling  GASTROINTESTINAL: No abdominal or epigastric pain. No nausea, vomiting or hematemesis; No diarrhea or constipation  NEUROLOGICAL: No headaches, memory loss, loss of strength, numbness, or tremors  SKIN: No itching, burning, rashes, or lesions   MUSCULOSKELETAL: see hpi     Allergies  No Known Allergies    PAST MEDICAL & SURGICAL HISTORY:  PVD (peripheral vascular disease)  DM (diabetes mellitus)  Type 2 diabetes mellitus with other specified complication, unspecified whether long term insulin use  Hyperlipidemia, unspecified hyperlipidemia type  Conary artery disease, angina presence unspecified, unspecified vessel or lesion type, unspecified whether native or transplanted heart  Pulmonary emphysema, unspecified emphysema type  No significant past surgical history    VITAL SIGNS:  T(C): 36.7 (02-09-21 @ 11:15), Max: 37.1 (02-08-21 @ 19:26)  HR: 86 (02-09-21 @ 11:15) (86 - 89)  BP: 116/65 (02-09-21 @ 11:15) (116/65 - 122/69)  RR: 18 (02-09-21 @ 11:15) (18 - 20)  SpO2: 94% (02-09-21 @ 11:15) (93% - 94%)  Wt(kg): --Vital Signs Last 24 Hrs  T(C): 36.7 (09 Feb 2021 11:15), Max: 37.1 (08 Feb 2021 19:26)  T(F): 98 (09 Feb 2021 11:15), Max: 98.7 (08 Feb 2021 19:26)  HR: 86 (09 Feb 2021 11:15) (86 - 89)  BP: 116/65 (09 Feb 2021 11:15) (116/65 - 122/69)  BP(mean): --  RR: 18 (09 Feb 2021 11:15) (18 - 20)  SpO2: 94% (09 Feb 2021 11:15) (93% - 94%)    PHYSICAL EXAM:  GENERAL: Pt sitting in bedside chair,right foot elevated on pillow, sitting comfortably in NAD  HEAD:  Atraumatic  EYES: EOMI, PERRL, conjunctiva and sclera clear  ENT: Moist mucous membranes  NECK: No JVD  CHEST/LUNG: Clear to auscultation bilaterally; No rales, rhonchi, wheezing, or rubs. Unlabored respirations  HEART: S1, S2, Regular rate and rhythm   ABDOMEN: Bowel sounds present; Soft, Nontender, Nondistended. No guarding or rigidity   EXTREMITIES:  2+ Peripheral Pulses, brisk capillary refill. No clubbing or cyanosis. 1+ pitting edema to dorsum of right foot with localized erythema. Left knee with moderate size joint effusion. FROM of all joints; however, limited to left knee due to effusion. No erythema or increased warmth, nontender to palpation   NERVOUS SYSTEM:  Alert & Oriented X3, speech clear, FROM x 4 extremities. No deficits   SKIN: No rashes or lesions    LABS:                     12.2   9.12  )-----------( 326      ( 09 Feb 2021 06:32 )             38.4     02-09  137  |  100  |  24.0<H>  ----------------------------<  151<H>  3.8   |  22.0  |  0.94  Ca    9.6      09 Feb 2021 06:32  Phos  3.1     02-08  Mg     1.6     02-08    TPro  7.2  /  Alb  4.1  /  TBili  0.3<L>  /  DBili  x   /  AST  14  /  ALT  14  /  AlkPhos  63  02-08    PT/INR - ( 07 Feb 2021 22:35 )   PT: 12.5 sec;   INR: 1.08 ratio    PTT - ( 07 Feb 2021 22:35 )  PTT:26.5 sec  CAPILLARY BLOOD GLUCOSE  POCT Blood Glucose.: 186 mg/dL (09 Feb 2021 11:32)  POCT Blood Glucose.: 157 mg/dL (09 Feb 2021 07:33)  POCT Blood Glucose.: 160 mg/dL (08 Feb 2021 21:14)  POCT Blood Glucose.: 152 mg/dL (08 Feb 2021 18:26)    MEDICATIONS  (STANDING):  aspirin enteric coated 325 milliGRAM(s) Oral daily  atorvastatin 80 milliGRAM(s) Oral at bedtime  cilostazol 100 milliGRAM(s) Oral every 12 hours  dextrose 40% Gel 15 Gram(s) Oral once  dextrose 5%. 1000 milliLiter(s) (50 mL/Hr) IV Continuous <Continuous>  dextrose 5%. 1000 milliLiter(s) (100 mL/Hr) IV Continuous <Continuous>  dextrose 50% Injectable 25 Gram(s) IV Push once  dextrose 50% Injectable 12.5 Gram(s) IV Push once  dextrose 50% Injectable 25 Gram(s) IV Push once  enoxaparin Injectable 40 milliGRAM(s) SubCutaneous daily  fenofibrate Tablet 145 milliGRAM(s) Oral daily  glucagon  Injectable 1 milliGRAM(s) IntraMuscular once  insulin glargine Injectable (LANTUS) 15 Unit(s) SubCutaneous at bedtime  insulin lispro (ADMELOG) corrective regimen sliding scale   SubCutaneous three times a day before meals  insulin lispro (ADMELOG) corrective regimen sliding scale   SubCutaneous at bedtime  levothyroxine 88 MICROGram(s) Oral daily  lisinopril 40 milliGRAM(s) Oral daily  metoprolol succinate  milliGRAM(s) Oral daily  piperacillin/tazobactam IVPB.. 3.375 Gram(s) IV Intermittent every 8 hours  ranolazine 500 milliGRAM(s) Oral two times a day  vancomycin  IVPB 750 milliGRAM(s) IV Intermittent every 12 hours    MEDICATIONS  (PRN):  acetaminophen   Tablet .. 650 milliGRAM(s) Oral every 6 hours PRN Temp greater or equal to 38C (100.4F), Mild Pain (1 - 3)

## 2021-02-09 NOTE — PROGRESS NOTE ADULT - ASSESSMENT
Pt is a 79y/o F with a history of diabetes, peripheral vascular disease, COPD, hypothyroidism, coronary artery disease, hypertension, recently hospitalized for cellulitis presents with recurrent right foot cellulitis. MRI on prior admission reporting no OM. X ray 1/20/2021 Three views of the right foot show no evidence of fracture nor destructive change. The joint spaces are maintained. Soft tissue swelling is seen along the dorsum of the foot. Calcification of the plantar fascia is unchanged.Doppler negative for DVT. Had not meeting sepsis criteria; however, blood cultures and wound cultures were taken.     #Right foot cellulitis, Recurrent Cellulitis  -ID recs appreciated  -c/w vanc/zosyn  -follow up blood cultures  -Skin culture showing Staphylococcus aureus, sensitivities pending for which ID will adjust antibiotics pending sensitivities   -podiatry recalled  -cont trend temps and leukocytosis    #Left knee joint effusion  - s/p fall 1 month ago and she follows up with an outpt orthopedist Dr Warren Lange. Reports 15cc serous drainage and was due to 3 week f/u appt 1/26/21  - consulted ortho for possible joint aspiration  - f/u knee xray    #Dm2  -A1c 1/21 7.7  -well controlled on lantus 15 qhs  -moderate iss w/ meals and qhs  -will adjust accordingly    #CAD/PVD  -c/w aspirin and cilastozol  -c/w atorvastatin and fenofibrate  -no chest pain, good pulses     #Hypothyroid  -c/w synthroid    #dvt ppx  -lovenox sq Pt is a 81y/o F with a history of diabetes, peripheral vascular disease, COPD, hypothyroidism, coronary artery disease, hypertension, recently hospitalized for cellulitis presents with recurrent right foot cellulitis. MRI on prior admission reporting no OM. X ray 1/20/2021 Three views of the right foot show no evidence of fracture nor destructive change. The joint spaces are maintained. Soft tissue swelling is seen along the dorsum of the foot. Calcification of the plantar fascia is unchanged.Doppler negative for DVT. Had not meeting sepsis criteria; however, blood cultures and wound cultures were taken.     #Right foot cellulitis, Recurrent Cellulitis  -ID recs appreciated  -c/w vanc/zosyn  -follow up blood cultures  -Skin culture showing Staphylococcus aureus, sensitivities pending for which ID will adjust antibiotics pending sensitivities   -podiatry recalled  -cont trend temps and leukocytosis  -RLE elevation    #Left knee joint effusion  - s/p fall 1 month ago and she follows up with an outpt orthopedist Dr Warren Lange. Reports 15cc serous drainage and was due to 3 week f/u appt 1/26/21  - consulted ortho for possible joint aspiration  - f/u knee xray    #Dm2  -A1c 1/21 7.7  -well controlled on lantus 15 qhs  -moderate iss w/ meals and qhs  -will adjust accordingly    #CAD/PVD  -c/w aspirin and cilastozol  -c/w atorvastatin and fenofibrate  -no chest pain, good pulses     #Hypothyroid  -c/w synthroid    #dvt ppx  -lovenox sq    Dispo: pending blood culture and sensitivities, podiatry f/u, possibly home 24-48hrs

## 2021-02-09 NOTE — PROGRESS NOTE ADULT - ASSESSMENT
80y  Female with h/o diabetes, peripheral vascular disease, COPD, hypothyroidism, coronary artery disease, hypertension. Patient was recently admitted from 1/21 to 1/24 and received vancomycin for cellulitis, was discharged on 1/24/21 on PO Keflex. Patient was doing well up until 3 days prior to presentation when her foot started to have worsening swelling and redness associated with pain. Patient reports her right foot had an abrasion in the beginning of January. Patient also tried to get topical from PCP, but her foot continued to worsen. Denies fever, chills. In ER, patient was afebrile, no leukocytosis received vancomycin and zosyn. Blood and wound cultures taken.       Rt Foot cellulitis with Staphylococcus aureus  PVD      - Blood cultures pending  - Foot cultures  Staphylococcus aureus, sensitivities pending  - Given no fever or leukocytosis, likely this is PVD vs cellulitis  - MRI on prior admission reporting no OM  - COVID 19 PCR negative   - Procalcitonin level 0.09  - Continue  Zosyn  - Continue Vancomycin  - Monitor trough  - Monitor for Vancomycin toxicity   - Staphylococcus aureus sensitivities pending, will adjust antibiotics pending sensitivities   - Trend Fever  - Trend Leukocytosis      Will Follow

## 2021-02-09 NOTE — CONSULT NOTE ADULT - SUBJECTIVE AND OBJECTIVE BOX
Pt Name: JAMAAL HERR  MRN: [id number]      Patient is a 80y admitted to medicine for right leg cellulitis about x2 weeks ago. Orthopedics called for concern of left knee swelling. Patient seen and examined. Patient sitting in chair comfortably. Patient states that prior to admission she was following up with her own Orthopedic doctor, Dr. Lange for her left knee after falling on it about a month ago. Patient states that xrays were taken and that it showed no fractures. Patient states that Dr. Lange tapped her left knee and per patient "15cc of fluid" was taken out and that it was "noninfectious". Patient states that she was suppose to follow up with Dr. Lange but has been unable to since admission to hospital and has missed scheduled appointments. Patient unsure when knee swelling began. Patient states that she is still able to ambulate and put weight on her left knee but its difficult due to her right leg cellulitis. Patient also admits to numbness to the left lateral knee/patella border. Patient denies recent trauma in hospital. Patient denies fever/chills, SOB, CP, numbness/tingling to rest of extremity, pain to knee. No other complaints.      REVIEW OF SYSTEMS  General: Alert, responsive, in NAD  Skin/Breast: No rashes, no pruritis   Respiratory and Thorax: No difficulty breathing. No cough.   Cardiovascular:	No chest pain. No palpitations.  Gastrointestinal:	 No abdominal pain. No diarrhea.   Musculoskeletal: SEE HPI.  Neurological: No sensory or motor changes.   Hematology/Lymphatics: + left knee swelling  ROS is otherwise negative.      PAST MEDICAL & SURGICAL HISTORY:  PVD (peripheral vascular disease)  DM (diabetes mellitus)  Type 2 diabetes mellitus with other specified complication, unspecified whether long term insulin use  Hyperlipidemia, unspecified hyperlipidemia type  Coronary artery disease, angina presence unspecified, unspecified vessel or lesion type, unspecified whether native or transplanted heart  Pulmonary emphysema, unspecified emphysema type  No significant past surgical history      Allergies: No Known Allergies      Medications: acetaminophen   Tablet .. 650 milliGRAM(s) Oral every 6 hours PRN  aspirin enteric coated 325 milliGRAM(s) Oral daily  atorvastatin 80 milliGRAM(s) Oral at bedtime  cilostazol 100 milliGRAM(s) Oral every 12 hours  dextrose 40% Gel 15 Gram(s) Oral once  dextrose 5%. 1000 milliLiter(s) IV Continuous <Continuous>  dextrose 5%. 1000 milliLiter(s) IV Continuous <Continuous>  dextrose 50% Injectable 25 Gram(s) IV Push once  dextrose 50% Injectable 12.5 Gram(s) IV Push once  dextrose 50% Injectable 25 Gram(s) IV Push once  enoxaparin Injectable 40 milliGRAM(s) SubCutaneous daily  fenofibrate Tablet 145 milliGRAM(s) Oral daily  glucagon  Injectable 1 milliGRAM(s) IntraMuscular once  insulin glargine Injectable (LANTUS) 15 Unit(s) SubCutaneous at bedtime  insulin lispro (ADMELOG) corrective regimen sliding scale   SubCutaneous three times a day before meals  insulin lispro (ADMELOG) corrective regimen sliding scale   SubCutaneous at bedtime  levothyroxine 88 MICROGram(s) Oral daily  lisinopril 40 milliGRAM(s) Oral daily  metoprolol succinate  milliGRAM(s) Oral daily  piperacillin/tazobactam IVPB.. 3.375 Gram(s) IV Intermittent every 8 hours  ranolazine 500 milliGRAM(s) Oral two times a day  vancomycin  IVPB 750 milliGRAM(s) IV Intermittent every 12 hours      FAMILY HISTORY:  Family history of liver cancer (Mother)    Family history of stomach cancer (Father)      Social History: non-contributory:                               12.2   9.12  )-----------( 326      ( 09 Feb 2021 06:32 )             38.4       02-09    137  |  100  |  24.0<H>  ----------------------------<  151<H>  3.8   |  22.0  |  0.94    Ca    9.6      09 Feb 2021 06:32  Phos  3.1     02-08  Mg     1.6     02-08    TPro  7.2  /  Alb  4.1  /  TBili  0.3<L>  /  DBili  x   /  AST  14  /  ALT  14  /  AlkPhos  63  02-08      Vital Signs Last 24 Hrs  T(C): 36.7 (09 Feb 2021 17:35), Max: 37.1 (08 Feb 2021 19:26)  T(F): 98 (09 Feb 2021 17:35), Max: 98.7 (08 Feb 2021 19:26)  HR: 89 (09 Feb 2021 17:35) (86 - 89)  BP: 126/63 (09 Feb 2021 17:35) (116/65 - 126/63)  BP(mean): --  RR: 18 (09 Feb 2021 17:35) (18 - 20)  SpO2: 100% (09 Feb 2021 17:35) (93% - 100%)    Daily     Daily       PHYSICAL EXAM:  Appearance: Alert, responsive, in no acute distress.  Musculoskeletal:       Left Lower Extremity: + gross knee swelling. + fluctulance noted to anterior knee/patella. No open wounds noted. +KF/KE/EHL/FHL/DF/PF. No erythema noted to knee. NTTP to patella or around knee. 2+ DP pulse. Decreased sensation to lateral knee around patella border. Sensation intact to rest of extremity.     Imaging Studies:  F/u images of L Knee       A/P:  Pt is a  80y Female with left knee swelling     PLAN:   - Follow up left knee xrays  - Pending plan as per Dr. Sherman  Pt Name: JAMAAL HERR  MRN: [id number]      Patient is a 80y admitted to medicine for right leg cellulitis about x2 weeks ago. Orthopedics called for concern of left knee swelling. Patient seen and examined. Patient sitting in chair comfortably. Patient states that prior to admission she was following up with her own Orthopedic doctor, Dr. Lange for her left knee after falling on it about a month ago. Patient states that xrays were taken and that it showed no fractures. Patient states that Dr. Lange tapped her left knee and per patient "15cc of fluid" was taken out and that it was "noninfectious". Patient states that she was suppose to follow up with Dr. Lange but has been unable to since admission to hospital and has missed scheduled appointments. Patient unsure when knee swelling began. Patient states that she is still able to ambulate and put weight on her left knee but its difficult due to her right leg cellulitis. Patient also admits to numbness to the left lateral knee/patella border. Patient denies recent trauma in hospital. Patient denies fever/chills, SOB, CP, numbness/tingling to rest of extremity, pain to knee. No other complaints.      REVIEW OF SYSTEMS  General: Alert, responsive, in NAD  Skin/Breast: No rashes, no pruritis   Respiratory and Thorax: No difficulty breathing. No cough.   Cardiovascular:	No chest pain. No palpitations.  Gastrointestinal:	 No abdominal pain. No diarrhea.   Musculoskeletal: SEE HPI.  Neurological: No sensory or motor changes.   Hematology/Lymphatics: + left knee swelling  ROS is otherwise negative.      PAST MEDICAL & SURGICAL HISTORY:  PVD (peripheral vascular disease)  DM (diabetes mellitus)  Type 2 diabetes mellitus with other specified complication, unspecified whether long term insulin use  Hyperlipidemia, unspecified hyperlipidemia type  Coronary artery disease, angina presence unspecified, unspecified vessel or lesion type, unspecified whether native or transplanted heart  Pulmonary emphysema, unspecified emphysema type  No significant past surgical history      Allergies: No Known Allergies      Medications: acetaminophen   Tablet .. 650 milliGRAM(s) Oral every 6 hours PRN  aspirin enteric coated 325 milliGRAM(s) Oral daily  atorvastatin 80 milliGRAM(s) Oral at bedtime  cilostazol 100 milliGRAM(s) Oral every 12 hours  dextrose 40% Gel 15 Gram(s) Oral once  dextrose 5%. 1000 milliLiter(s) IV Continuous <Continuous>  dextrose 5%. 1000 milliLiter(s) IV Continuous <Continuous>  dextrose 50% Injectable 25 Gram(s) IV Push once  dextrose 50% Injectable 12.5 Gram(s) IV Push once  dextrose 50% Injectable 25 Gram(s) IV Push once  enoxaparin Injectable 40 milliGRAM(s) SubCutaneous daily  fenofibrate Tablet 145 milliGRAM(s) Oral daily  glucagon  Injectable 1 milliGRAM(s) IntraMuscular once  insulin glargine Injectable (LANTUS) 15 Unit(s) SubCutaneous at bedtime  insulin lispro (ADMELOG) corrective regimen sliding scale   SubCutaneous three times a day before meals  insulin lispro (ADMELOG) corrective regimen sliding scale   SubCutaneous at bedtime  levothyroxine 88 MICROGram(s) Oral daily  lisinopril 40 milliGRAM(s) Oral daily  metoprolol succinate  milliGRAM(s) Oral daily  piperacillin/tazobactam IVPB.. 3.375 Gram(s) IV Intermittent every 8 hours  ranolazine 500 milliGRAM(s) Oral two times a day  vancomycin  IVPB 750 milliGRAM(s) IV Intermittent every 12 hours      FAMILY HISTORY:  Family history of liver cancer (Mother)    Family history of stomach cancer (Father)      Social History: non-contributory:                               12.2   9.12  )-----------( 326      ( 09 Feb 2021 06:32 )             38.4       02-09    137  |  100  |  24.0<H>  ----------------------------<  151<H>  3.8   |  22.0  |  0.94    Ca    9.6      09 Feb 2021 06:32  Phos  3.1     02-08  Mg     1.6     02-08    TPro  7.2  /  Alb  4.1  /  TBili  0.3<L>  /  DBili  x   /  AST  14  /  ALT  14  /  AlkPhos  63  02-08      Vital Signs Last 24 Hrs  T(C): 36.7 (09 Feb 2021 17:35), Max: 37.1 (08 Feb 2021 19:26)  T(F): 98 (09 Feb 2021 17:35), Max: 98.7 (08 Feb 2021 19:26)  HR: 89 (09 Feb 2021 17:35) (86 - 89)  BP: 126/63 (09 Feb 2021 17:35) (116/65 - 126/63)  BP(mean): --  RR: 18 (09 Feb 2021 17:35) (18 - 20)  SpO2: 100% (09 Feb 2021 17:35) (93% - 100%)    Daily     Daily       PHYSICAL EXAM:  Appearance: Alert, responsive, in no acute distress.  Musculoskeletal:       Left Lower Extremity: + gross knee swelling. + fluctulance noted prepatellar. No open wounds noted. +KF/KE/EHL/FHL/DF/PF. No erythema noted to knee. NTTP to patella or around knee. 2+ DP pulse. Decreased sensation to lateral knee around patella border. Sensation intact to rest of extremity.     Imaging Studies:  F/u images of L Knee       A/P:  Pt is a  80y Female with left knee swelling     PLAN:   - Follow up left knee xrays  - Pending plan as per Dr. Sherman

## 2021-02-09 NOTE — PROGRESS NOTE ADULT - SUBJECTIVE AND OBJECTIVE BOX
PLAN as per Dr. Sherman:  * No immediate orthopedic surgical intervention necessary at this time  * Patient may follow up in the office with her orthopedic surgeon (Dr. Lange) when d/c'd from hospital  * Pain control as clinically indicated  * Reconsult for any worsening or concerning symptoms PLAN as per Dr. Sherman:  * No immediate orthopedic surgical intervention necessary at this time  * Patient may follow up in the office with her orthopedic surgeon (Dr. Lange) when d/c'd from hospital  * Pain control as clinically indicated  * Recommend to continue IV abx in house and d/c with PO abx  * Reconsult for any worsening or concerning symptoms

## 2021-02-09 NOTE — PROGRESS NOTE ADULT - SUBJECTIVE AND OBJECTIVE BOX
[facility]  INFECTIOUS DISEASES AND INTERNAL MEDICINE at [place]  =======================================================  Shabbir Laurent MD  Diplomates American Board of Internal Medicine and Infectious Diseases  Tel: [phone]      Fax: [phone]  =======================================================    JAMAAL HERR [id number]    Follow up: Rt Foot cellulitis     No fever or chills  No complaints     Allergies:  No Known Allergies      REVIEW OF SYSTEMS:  CONSTITUTIONAL:  No Fever or chills  HEENT:  No diplopia or blurred vision.  No earache, sore throat or runny nose.  CARDIOVASCULAR:  No pressure, squeezing, strangling, tightness, heaviness or aching about the chest, neck, axilla or epigastrium.  RESPIRATORY:  No cough, shortness of breath  GASTROINTESTINAL:  No nausea, vomiting or diarrhea.  GENITOURINARY:  No dysuria, frequency or urgency. No Blood in urine  MUSCULOSKELETAL:  no joint aches, no muscle pain  SKIN:  Rt foot redness and swelling  NEUROLOGIC:  No Headaches, seizures  PSYCHIATRIC:  No disorder of thought or mood.  ENDOCRINE:  No heat or cold intolerance  HEMATOLOGICAL:  No easy bruising or bleeding.       Physical Exam:  GEN: NAD, pleasant  HEENT: normocephalic and atraumatic. EOMI. PERRL.  Anicteric  NECK: Supple.   LUNGS: Clear to auscultation.  HEART: Regular rate and rhythm   ABDOMEN: Soft, nontender, and nondistended.  Positive bowel sounds.    : No CVA tenderness  EXTREMITIES: Rt foot redness and swelling, No warmth   MSK: No joint swelling  NEUROLOGIC:  No Focal Deficits  PSYCHIATRIC: Appropriate affect .  SKIN: No Rash      Vitals:  T(F): 97.3 (09 Feb 2021 05:00), Max: 98.7 (08 Feb 2021 19:26)  HR: 87 (09 Feb 2021 05:00)  BP: 118/72 (09 Feb 2021 05:00)  RR: 20 (09 Feb 2021 05:00)  SpO2: 93% (09 Feb 2021 05:00) (91% - 93%)  temp max in last 48H T(F): , Max: 98.7 (02-08-21 @ 19:26)      Current Antibiotics:  piperacillin/tazobactam IVPB.. 3.375 Gram(s) IV Intermittent every 8 hours  vancomycin  IVPB 750 milliGRAM(s) IV Intermittent every 12 hours    Other medications:  aspirin enteric coated 325 milliGRAM(s) Oral daily  atorvastatin 80 milliGRAM(s) Oral at bedtime  cilostazol 100 milliGRAM(s) Oral every 12 hours  dextrose 40% Gel 15 Gram(s) Oral once  dextrose 5%. 1000 milliLiter(s) IV Continuous <Continuous>  dextrose 5%. 1000 milliLiter(s) IV Continuous <Continuous>  dextrose 50% Injectable 25 Gram(s) IV Push once  dextrose 50% Injectable 12.5 Gram(s) IV Push once  dextrose 50% Injectable 25 Gram(s) IV Push once  enoxaparin Injectable 40 milliGRAM(s) SubCutaneous daily  fenofibrate Tablet 145 milliGRAM(s) Oral daily  glucagon  Injectable 1 milliGRAM(s) IntraMuscular once  insulin glargine Injectable (LANTUS) 15 Unit(s) SubCutaneous at bedtime  insulin lispro (ADMELOG) corrective regimen sliding scale   SubCutaneous three times a day before meals  insulin lispro (ADMELOG) corrective regimen sliding scale   SubCutaneous at bedtime  levothyroxine 88 MICROGram(s) Oral daily  lisinopril 40 milliGRAM(s) Oral daily  metoprolol succinate  milliGRAM(s) Oral daily  ranolazine 500 milliGRAM(s) Oral two times a day                            12.2   9.12  )-----------( 326      ( 09 Feb 2021 06:32 )             38.4     02-09    137  |  100  |  24.0<H>  ----------------------------<  151<H>  3.8   |  22.0  |  0.94    Ca    9.6      09 Feb 2021 06:32  Phos  3.1     02-08  Mg     1.6     02-08    TPro  7.2  /  Alb  4.1  /  TBili  0.3<L>  /  DBili  x   /  AST  14  /  ALT  14  /  AlkPhos  63  02-08    RECENT CULTURES:  02-08 @ 01:02 Skin Rt foot     Moderate Staphylococcus aureus    WBC Count: 9.12 K/uL (02-09-21 @ 06:32)  WBC Count: 8.94 K/uL (02-08-21 @ 09:12)  WBC Count: 9.70 K/uL (02-07-21 @ 22:35)    Creatinine, Serum: 0.94 mg/dL (02-09-21 @ 06:32)  Creatinine, Serum: 0.83 mg/dL (02-08-21 @ 09:12)  Creatinine, Serum: 0.87 mg/dL (02-07-21 @ 22:35)    Procalcitonin, Serum: 0.09 ng/mL (02-09-21 @ 06:32)    COVID-19 IgG Antibody Index: 0.07 Index (02-08-21 @ 15:52)  COVID-19 IgG Antibody Interpretation: Negative (02-08-21 @ 15:52)  COVID-19 PCR: NotDetec (02-07-21 @ 22:36)  COVID-19 IgG Antibody Index: <0.10 Index (01-22-21 @ 13:16)  COVID-19 IgG Antibody Interpretation: Negative (01-22-21 @ 13:16)  COVID-19 PCR: NotDetec (01-20-21 @ 20:28)        < from: Xray Foot AP + Lateral + Oblique, Right (02.07.21 @ 22:53) >  EXAM:  FOOT-RIGHT                          PROCEDURE DATE:  02/07/2021      INTERPRETATION:  Right foot    HISTORY: Pain    Comparison: 1/20/2021     Three views of the right foot show no evidence of fracture nor destructive change. The joint spaces are maintained. Soft tissue swelling is seen along the dorsum of the foot. Calcification of the plantar fascia is unchanged.    IMPRESSION: Increased soft tissue swelling.    < end of copied text >

## 2021-02-10 NOTE — PHARMACOTHERAPY INTERVENTION NOTE - COMMENTS
Skin culture growing MSSA, discussed with ID, can discontinue vancomycin.  ID to evaluate for further de-escalation of therapy.

## 2021-02-10 NOTE — PROGRESS NOTE ADULT - SUBJECTIVE AND OBJECTIVE BOX
[facility]  INFECTIOUS DISEASES AND INTERNAL MEDICINE at [place]  =======================================================  Shabbir Laurent MD  Diplomates American Board of Internal Medicine and Infectious Diseases  Tel: [phone]      Fax: [phone]  =======================================================    JAMAAL HERR [id number]    Follow up: Rt Foot cellulitis     No fever or chills  No complaints     Allergies:  No Known Allergies      REVIEW OF SYSTEMS:  CONSTITUTIONAL:  No Fever or chills  HEENT:  No diplopia or blurred vision.  No earache, sore throat or runny nose.  CARDIOVASCULAR:  No pressure, squeezing, strangling, tightness, heaviness or aching about the chest, neck, axilla or epigastrium.  RESPIRATORY:  No cough, shortness of breath  GASTROINTESTINAL:  No nausea, vomiting or diarrhea.  GENITOURINARY:  No dysuria, frequency or urgency.   MUSCULOSKELETAL:  no joint aches, no muscle pain  SKIN:  Rt foot redness and swelling  NEUROLOGIC:  No Headaches, seizures  PSYCHIATRIC:  No disorder of thought or mood.  ENDOCRINE:  No heat or cold intolerance  HEMATOLOGICAL:  No easy bruising or bleeding.       Physical Exam:  GEN: NAD, pleasant  HEENT: normocephalic and atraumatic. EOMI. PERRL.  Anicteric  NECK: Supple.   LUNGS: Clear to auscultation.  HEART: Regular rate and rhythm   ABDOMEN: Soft, nontender, and nondistended.  Positive bowel sounds.    : No CVA tenderness  EXTREMITIES: Rt foot redness and swelling improved, No warmth   MSK: No joint swelling  NEUROLOGIC:  No Focal Deficits  PSYCHIATRIC: Appropriate affect .  SKIN: No Rash      Vitals:  T(F): 97.9 (10 Feb 2021 12:09), Max: 98 (09 Feb 2021 17:35)  HR: 72 (10 Feb 2021 12:09)  BP: 110/67 (10 Feb 2021 12:09)  RR: 18 (10 Feb 2021 12:09)  SpO2: 92% (10 Feb 2021 12:09) (92% - 100%)  temp max in last 48H T(F): , Max: 98.7 (02-08-21 @ 19:26)      Current Antibiotics:  piperacillin/tazobactam IVPB.. 3.375 Gram(s) IV Intermittent every 8 hours      Other medications:  aspirin enteric coated 325 milliGRAM(s) Oral daily  atorvastatin 80 milliGRAM(s) Oral at bedtime  cilostazol 100 milliGRAM(s) Oral every 12 hours  dextrose 40% Gel 15 Gram(s) Oral once  dextrose 5%. 1000 milliLiter(s) IV Continuous <Continuous>  dextrose 5%. 1000 milliLiter(s) IV Continuous <Continuous>  dextrose 50% Injectable 25 Gram(s) IV Push once  dextrose 50% Injectable 12.5 Gram(s) IV Push once  dextrose 50% Injectable 25 Gram(s) IV Push once  enoxaparin Injectable 40 milliGRAM(s) SubCutaneous daily  fenofibrate Tablet 145 milliGRAM(s) Oral daily  glucagon  Injectable 1 milliGRAM(s) IntraMuscular once  insulin glargine Injectable (LANTUS) 15 Unit(s) SubCutaneous at bedtime  insulin lispro (ADMELOG) corrective regimen sliding scale   SubCutaneous three times a day before meals  insulin lispro (ADMELOG) corrective regimen sliding scale   SubCutaneous at bedtime  levothyroxine 88 MICROGram(s) Oral daily  lisinopril 40 milliGRAM(s) Oral daily  metoprolol succinate  milliGRAM(s) Oral daily  ranolazine 500 milliGRAM(s) Oral two times a day  saccharomyces boulardii 250 milliGRAM(s) Oral two times a day                 11.8   7.91  )-----------( 316      ( 10 Feb 2021 07:45 )             36.9     02-10    136  |  100  |  33.0<H>  ----------------------------<  152<H>  4.1   |  22.0  |  1.29    Ca    9.5      10 Feb 2021 07:45      RECENT CULTURES:  02-08 @ 01:02 Skin Rt foot Staphylococcus aureus    Moderate Staphylococcus aureus    02-07 @ 22:38 .Blood Blood     No growth at 48 hours      WBC Count: 7.91 K/uL (02-10-21 @ 07:45)  WBC Count: 9.12 K/uL (02-09-21 @ 06:32)  WBC Count: 8.94 K/uL (02-08-21 @ 09:12)  WBC Count: 9.70 K/uL (02-07-21 @ 22:35)    Creatinine, Serum: 1.29 mg/dL (02-10-21 @ 07:45)  Creatinine, Serum: 0.94 mg/dL (02-09-21 @ 06:32)  Creatinine, Serum: 0.83 mg/dL (02-08-21 @ 09:12)  Creatinine, Serum: 0.87 mg/dL (02-07-21 @ 22:35)    Procalcitonin, Serum: 0.08 ng/mL (02-09-21 @ 06:32)    COVID-19 IgG Antibody Index: 0.07 Index (02-08-21 @ 15:52)  COVID-19 IgG Antibody Interpretation: Negative (02-08-21 @ 15:52)  COVID-19 PCR: NotDetec (02-07-21 @ 22:36)  COVID-19 IgG Antibody Index: <0.10 Index (01-22-21 @ 13:16)  COVID-19 IgG Antibody Interpretation: Negative (01-22-21 @ 13:16)  COVID-19 PCR: NotDetec (01-20-21 @ 20:28)      < from: Xray Foot AP + Lateral + Oblique, Right (02.07.21 @ 22:53) >  EXAM:  FOOT-RIGHT                          PROCEDURE DATE:  02/07/2021      INTERPRETATION:  Right foot    HISTORY: Pain    Comparison: 1/20/2021     Three views of the right foot show no evidence of fracture nor destructive change. The joint spaces are maintained. Soft tissue swelling is seen along the dorsum of the foot. Calcification of the plantar fascia is unchanged.    IMPRESSION: Increased soft tissue swelling.    < end of copied text >

## 2021-02-10 NOTE — PROGRESS NOTE ADULT - SUBJECTIVE AND OBJECTIVE BOX
JAMAAL HERR Patient is a 80y old  Female who presents with a chief complaint of cellulitis (09 Feb 2021 22:23)     HPI:  80F with a history of diabetes, peripheral vascular disease, COPD, hypothyroidism, coronary artery disease, hypertension, recently hospitalized for cellulitis presents with recurrent cellulitis. Pt stated started having redness and pain on right foot after getting an abrasion in the beginning of January. She was admitted from 1/21 to 1/24 and received vancomycin with good response. She was discharged on Keflex for 7 days. She stated redness and pain initially improved, but came back again 1 week ago after abx completed. She tried to get topical from PCP, but continued to worsen and now returned to hospital. Complains of pain mainly on dorsal foot and heal. Denies fever, chills, chest pain, abdominal pain, N/v/d.     In ER, patient received vancomycin and zosyn. Blood and wound cultures taken.  (08 Feb 2021 00:03)    The patient was seen and evaluated states her pain is controlled with percocet   The patient is in no acute distress.  Denied any fever chest pain, palpitations, shortness of breath, abdominal pain, fever, dysuria, cough, edema       I&O's Summary    Allergies    No Known Allergies    Intolerances      HEALTH ISSUES - PROBLEM Dx:        PAST MEDICAL & SURGICAL HISTORY:  PVD (peripheral vascular disease)    DM (diabetes mellitus)    Type 2 diabetes mellitus with other specified complication, unspecified whether long term insulin use    Hyperlipidemia, unspecified hyperlipidemia type    Coronary artery disease, angina presence unspecified, unspecified vessel or lesion type, unspecified whether native or transplanted heart    Pulmonary emphysema, unspecified emphysema type    No significant past surgical history            Vital Signs Last 24 Hrs  T(C): 36.6 (10 Feb 2021 12:09), Max: 36.7 (09 Feb 2021 17:35)  T(F): 97.9 (10 Feb 2021 12:09), Max: 98 (09 Feb 2021 17:35)  HR: 72 (10 Feb 2021 12:09) (72 - 89)  BP: 110/67 (10 Feb 2021 12:09) (110/67 - 130/74)  BP(mean): --  RR: 18 (10 Feb 2021 12:09) (18 - 18)  SpO2: 92% (10 Feb 2021 12:09) (92% - 100%)T(C): 36.6 (02-10-21 @ 12:09), Max: 36.7 (02-09-21 @ 17:35)  HR: 72 (02-10-21 @ 12:09) (72 - 89)  BP: 110/67 (02-10-21 @ 12:09) (110/67 - 130/74)  RR: 18 (02-10-21 @ 12:09) (18 - 18)  SpO2: 92% (02-10-21 @ 12:09) (92% - 100%)  Wt(kg): --    PHYSICAL EXAM:    GENERAL: NAD,   HEAD:  Atraumatic, Normocephalic  NERVOUS SYSTEM:  Alert & Oriented X3,  Moves upper and lower extremities; CNS-II-XII  CHEST/LUNG: Clear to auscultation bilaterally; No rales, rhonchi, wheezing,   HEART: Regular rate and rhythm; No murmurs,   ABDOMEN: Soft, Nontender, Nondistended; Bowel sounds present  EXTREMITIES:  Peripheral Pulses, No  cyanosis, or edema      acetaminophen   Tablet .. 650 milliGRAM(s) Oral every 6 hours PRN  aspirin enteric coated 325 milliGRAM(s) Oral daily  atorvastatin 80 milliGRAM(s) Oral at bedtime  cilostazol 100 milliGRAM(s) Oral every 12 hours  dextrose 40% Gel 15 Gram(s) Oral once  dextrose 5%. 1000 milliLiter(s) IV Continuous <Continuous>  dextrose 5%. 1000 milliLiter(s) IV Continuous <Continuous>  dextrose 50% Injectable 25 Gram(s) IV Push once  dextrose 50% Injectable 12.5 Gram(s) IV Push once  dextrose 50% Injectable 25 Gram(s) IV Push once  enoxaparin Injectable 40 milliGRAM(s) SubCutaneous daily  fenofibrate Tablet 145 milliGRAM(s) Oral daily  glucagon  Injectable 1 milliGRAM(s) IntraMuscular once  insulin glargine Injectable (LANTUS) 15 Unit(s) SubCutaneous at bedtime  insulin lispro (ADMELOG) corrective regimen sliding scale   SubCutaneous three times a day before meals  insulin lispro (ADMELOG) corrective regimen sliding scale   SubCutaneous at bedtime  levothyroxine 88 MICROGram(s) Oral daily  lisinopril 40 milliGRAM(s) Oral daily  metoprolol succinate  milliGRAM(s) Oral daily  oxycodone    5 mG/acetaminophen 325 mG 1 Tablet(s) Oral every 6 hours PRN  piperacillin/tazobactam IVPB.. 3.375 Gram(s) IV Intermittent every 8 hours  ranolazine 500 milliGRAM(s) Oral two times a day  saccharomyces boulardii 250 milliGRAM(s) Oral two times a day      LABS:                          11.8   7.91  )-----------( 316      ( 10 Feb 2021 07:45 )             36.9     02-10    136  |  100  |  33.0<H>  ----------------------------<  152<H>  4.1   |  22.0  |  1.29    Ca    9.5      10 Feb 2021 07:45                CAPILLARY BLOOD GLUCOSE      POCT Blood Glucose.: 162 mg/dL (10 Feb 2021 12:04)  POCT Blood Glucose.: 148 mg/dL (10 Feb 2021 08:36)  POCT Blood Glucose.: 196 mg/dL (09 Feb 2021 22:09)  POCT Blood Glucose.: 189 mg/dL (09 Feb 2021 17:06)      RADIOLOGY & ADDITIONAL TESTS:      Consultant notes reviewed    Case discussed with consultant/provider/ family /patient

## 2021-02-10 NOTE — PROGRESS NOTE ADULT - ASSESSMENT
80y  Female with h/o diabetes, peripheral vascular disease, COPD, hypothyroidism, coronary artery disease, hypertension. Patient was recently admitted from 1/21 to 1/24 and received vancomycin for cellulitis, was discharged on 1/24/21 on PO Keflex. Patient was doing well up until 3 days prior to presentation when her foot started to have worsening swelling and redness associated with pain. Patient reports her right foot had an abrasion in the beginning of January. Patient also tried to get topical from PCP, but her foot continued to worsen. Denies fever, chills. In ER, patient was afebrile, no leukocytosis received vancomycin and zosyn. Blood and wound cultures taken.       Rt Foot cellulitis with Staphylococcus aureus  PVD      - Blood cultures no growth   - Foot cultures  Staphylococcus aureus, MSSA  - Given no fever or leukocytosis, likely this is PVD vs cellulitis  - MRI on prior admission reporting no OM  - COVID 19 PCR negative   - Procalcitonin level 0.09  - Continue  Zosyn, will complete course 2/11/21  - D/C Vancomycin  - Trend Fever  - Trend Leukocytosis      Will sign off. Please call PRN.

## 2021-02-10 NOTE — PROGRESS NOTE ADULT - ASSESSMENT
81y/o F with a history of diabetes, peripheral vascular disease, COPD, hypothyroidism, coronary artery disease, hypertension, recently hospitalized for cellulitis presents with recurrent right foot cellulitis. MRI on prior admission reporting no OM. X ray 1/20/2021 Three views of the right foot show no evidence of fracture nor destructive change. The joint spaces are maintained. Soft tissue swelling is seen along the dorsum of the foot. Calcification of the plantar fascia is unchanged. Doppler negative for DVT. Had not meeting sepsis criteria; however, blood cultures and wound cultures were taken.     #Right foot cellulitis, Recurrent Cellulitis  -ID recs appreciated  -c/w zosyn  -follow up blood cultures  -podiatry recalled  -RLE elevation    #Left knee joint effusion  - s/p fall 1 month ago and she follows up with an outpt orthopedist Dr Warren Lange. Reports 15cc serous drainage and was due to 3 week f/u appt 1/26/21  - consulted ortho for possible joint aspiration  - f/u knee xray    #DM2  -A1c 1/21 7.7  -well controlled on Lantus 15 qhs  -moderate iss w/ meals and qhs  -will adjust accordingly    #CAD/PVD  -c/w aspirin and Cilostazol  -c/w atorvastatin and fenofibrate  -no chest pain, good pulses     #Hypothyroid  -c/w synthroid    #dvt ppx  -lovenox sq    Dispo: pending blood culture and sensitivities, podiatry f/u, possibly home 24-48hrs

## 2021-02-10 NOTE — CONSULT NOTE ADULT - SUBJECTIVE AND OBJECTIVE BOX
S : 80y year old Female seen at bedside for Right foot cellulitis.  Pt was last seen in January for a similar complaint and states it has not gotten better. Pt currently being medically managed by medicine and ID. Pt admits to some pain in the affected right foot. Pt admits to feeling cold in her feet. No other pedal complaints at this time.   Patient admits to  (-) Fevers, (-) Chills, (-) Nausea, (-) Vomiting, (-) Shortness of Breath      PMH: DM  PSH:    Allergies:No Known Allergies      Labs:                          11.8   7.91  )-----------( 316      ( 10 Feb 2021 07:45 )             36.9     02-10    136  |  100  |  33.0<H>  ----------------------------<  152<H>  4.1   |  22.0  |  1.29    Ca    9.5      10 Feb 2021 07:45    ICU Vital Signs Last 24 Hrs  T(C): 36.6 (10 Feb 2021 12:09), Max: 36.7 (09 Feb 2021 17:35)  T(F): 97.9 (10 Feb 2021 12:09), Max: 98 (09 Feb 2021 17:35)  HR: 72 (10 Feb 2021 12:09) (72 - 89)  BP: 110/67 (10 Feb 2021 12:09) (110/67 - 130/74)  BP(mean): --  ABP: --  ABP(mean): --  RR: 18 (10 Feb 2021 12:09) (18 - 18)  SpO2: 92% (10 Feb 2021 12:09) (92% - 100%)      O:   General: Pleasant  female NAD & AOX3.    Integument:  Skin warm, dry and supple bilateral.    Derm: erythema noted to dorsal aspect of the right forefoot - TG cold at the level of the digits R>L  Vascular: Dorsalis Pedis and Posterior Tibial pulses 1/4.  Capillary re-fill time less then 3 seconds digits 1-5 bilateral.    Neuro: Protective sensation diminished to the level of the digits bilateral.  MSK: Muscle strength 5/5 all major muscle groups bilateral.    A: Right foot cellulitis vs PVD      P:   Chart reviewed and Patient evaluated  Discussed diagnosis and treatment with patient  Given the temperature of  the digits and weak pulse - most likely PVD in nature  IONA/PVR ordered - pending  Recommend Vascular consult for further evaluation   X-rays reviewed neg  Continue with antibiotics As Per ID  Weight bearing full  Offloading to bilateral Heels.   Discussed importance of daily foot examinations and proper shoe gear and to importance of lower Fasting Blood Glucose levels.   No need for further podiatric intervention during this hospital admission  Discussed with Attending Dr Roldan

## 2021-02-11 NOTE — CONSULT NOTE ADULT - ASSESSMENT
Patient is a 80 year old female with peripheral vascular disease s/p remote bypass in 2003 of the femoral artery. Details of the surgery are unknown to the patient. She has no rest pain at this time. IONA's to the bilateral lower extremities are abnormal, but may be falsely worsened given quality of exam due to body habitus.   - Renal optimization, consider nephrology consult of renal function fails to normalize  - Continued hydration, will need CTA aorta with runoff bilaterally to assess vascular flow to extremities  - Vascular surgery will continue to follow

## 2021-02-11 NOTE — PROGRESS NOTE ADULT - SUBJECTIVE AND OBJECTIVE BOX
JAMAAL HERR Patient is a 80y old  Female who presents with a chief complaint of cellulitis (11 Feb 2021 01:35)     HPI:  80F with a history of diabetes, peripheral vascular disease, COPD, hypothyroidism, coronary artery disease, hypertension, recently hospitalized for cellulitis presents with recurrent cellulitis. Pt stated started having redness and pain on right foot after getting an abrasion in the beginning of January. She was admitted from 1/21 to 1/24 and received vancomycin with good response. She was discharged on Keflex for 7 days. She stated redness and pain initially improved, but came back again 1 week ago after abx completed. She tried to get topical from PCP, but continued to worsen and now returned to hospital. Complains of pain mainly on dorsal foot and heal. Denies fever, chills, chest pain, abdominal pain, N/v/d.     In ER, patient received vancomycin and zosyn. Blood and wound cultures taken.  (08 Feb 2021 00:03)    The patient was seen and evaluated   The patient is in no acute distress.  Denied any fever chest pain, palpitations, shortness of breath, abdominal pain, fever, dysuria, cough, edema   Complains of matt in the foot- more last night - now under control    I&O's Summary    10 Feb 2021 07:01  -  11 Feb 2021 07:00  --------------------------------------------------------  IN: 340 mL / OUT: 0 mL / NET: 340 mL      Allergies    No Known Allergies    Intolerances      HEALTH ISSUES - PROBLEM Dx:        PAST MEDICAL & SURGICAL HISTORY:  PVD (peripheral vascular disease)    DM (diabetes mellitus)    Type 2 diabetes mellitus with other specified complication, unspecified whether long term insulin use    Hyperlipidemia, unspecified hyperlipidemia type    Coronary artery disease, angina presence unspecified, unspecified vessel or lesion type, unspecified whether native or transplanted heart    Pulmonary emphysema, unspecified emphysema type    No significant past surgical history            Vital Signs Last 24 Hrs  T(C): 37.1 (11 Feb 2021 11:25), Max: 37.1 (11 Feb 2021 11:25)  T(F): 98.7 (11 Feb 2021 11:25), Max: 98.7 (11 Feb 2021 11:25)  HR: 78 (11 Feb 2021 11:25) (78 - 102)  BP: 130/76 (11 Feb 2021 11:25) (121/69 - 156/72)  BP(mean): --  RR: 18 (11 Feb 2021 11:25) (18 - 22)  SpO2: 92% (11 Feb 2021 11:25) (92% - 95%)T(C): 37.1 (02-11-21 @ 11:25), Max: 37.1 (02-11-21 @ 11:25)  HR: 78 (02-11-21 @ 11:25) (78 - 102)  BP: 130/76 (02-11-21 @ 11:25) (121/69 - 156/72)  RR: 18 (02-11-21 @ 11:25) (18 - 22)  SpO2: 92% (02-11-21 @ 11:25) (92% - 95%)  Wt(kg): --    PHYSICAL EXAM:    GENERAL: NAD, well-groomed, well-developed  HEAD:  Atraumatic, Normocephalic  EYES: EOMI, PERRL  NERVOUS SYSTEM:  Alert & Oriented X3,  Moves upper and lower extremities; CNS-II-XII  CHEST/LUNG: Clear to auscultation bilaterally; No rales, rhonchi, wheezing,   HEART: Regular rate and rhythm; No murmurs,   ABDOMEN: Soft, Nontender, Nondistended; Bowel sounds present  EXTREMITIES: SKIN:red tender swollen foot  psychiatry- mood and affect approprite, Insight and judgement intact     acetaminophen   Tablet .. 650 milliGRAM(s) Oral every 6 hours PRN  aspirin enteric coated 325 milliGRAM(s) Oral daily  atorvastatin 80 milliGRAM(s) Oral at bedtime  cilostazol 100 milliGRAM(s) Oral every 12 hours  dextrose 40% Gel 15 Gram(s) Oral once  dextrose 5%. 1000 milliLiter(s) IV Continuous <Continuous>  dextrose 5%. 1000 milliLiter(s) IV Continuous <Continuous>  dextrose 50% Injectable 25 Gram(s) IV Push once  dextrose 50% Injectable 12.5 Gram(s) IV Push once  dextrose 50% Injectable 25 Gram(s) IV Push once  enoxaparin Injectable 40 milliGRAM(s) SubCutaneous daily  fenofibrate Tablet 145 milliGRAM(s) Oral daily  glucagon  Injectable 1 milliGRAM(s) IntraMuscular once  insulin glargine Injectable (LANTUS) 15 Unit(s) SubCutaneous at bedtime  insulin lispro (ADMELOG) corrective regimen sliding scale   SubCutaneous three times a day before meals  insulin lispro (ADMELOG) corrective regimen sliding scale   SubCutaneous at bedtime  levothyroxine 88 MICROGram(s) Oral daily  lisinopril 40 milliGRAM(s) Oral daily  metoprolol succinate  milliGRAM(s) Oral daily  oxycodone    5 mG/acetaminophen 325 mG 1 Tablet(s) Oral every 6 hours PRN  piperacillin/tazobactam IVPB.. 3.375 Gram(s) IV Intermittent every 8 hours  ranolazine 500 milliGRAM(s) Oral two times a day  saccharomyces boulardii 250 milliGRAM(s) Oral two times a day      LABS:                          11.8   7.91  )-----------( 316      ( 10 Feb 2021 07:45 )             36.9     02-10    139  |  101  |  37.0<H>  ----------------------------<  213<H>  4.2   |  23.0  |  1.30    Ca    10.3<H>      10 Feb 2021 22:42  Phos  3.3     02-10  Mg     1.8     02-10          CARDIAC MARKERS ( 10 Feb 2021 22:42 )  x     / <0.01 ng/mL / x     / x     / x            CAPILLARY BLOOD GLUCOSE      POCT Blood Glucose.: 172 mg/dL (11 Feb 2021 12:01)  POCT Blood Glucose.: 167 mg/dL (11 Feb 2021 08:04)  POCT Blood Glucose.: 201 mg/dL (10 Feb 2021 21:36)  POCT Blood Glucose.: 153 mg/dL (10 Feb 2021 18:23)      RADIOLOGY & ADDITIONAL TESTS:      Consultant notes reviewed    Case discussed with consultant/provider/ family /patient

## 2021-02-11 NOTE — CONSULT NOTE ADULT - SUBJECTIVE AND OBJECTIVE BOX
Vascular Attending:  Dr. Coleman      HPI:  80F with a history of diabetes, peripheral vascular disease, COPD, hypothyroidism, coronary artery disease, hypertension, recently hospitalized for cellulitis presents with recurrent cellulitis. Pt stated started having redness and pain on right foot after getting an abrasion in the beginning of January. She was admitted from 1/21 to 1/24 and received vancomycin with good response. She was discharged on Keflex for 7 days. She stated redness and pain initially improved, but came back again 1 week ago after abx completed. She tried to get topical from PCP, but continued to worsen and now returned to hospital. Complains of pain mainly on dorsal foot and heal. Denies fever, chills, chest pain, abdominal pain, N/v/d.     In ER, patient received vancomycin and zosyn. Blood and wound cultures taken.  (08 Feb 2021 00:03)    Patient follows with Dr. Wells with prior history of right lower extremity femoral bypass in 2003 with revision for aneurysm in 2005. Patient with known peripheral vascular disease with claudication after about 10 minutes of ambulation, no rest pain presents with poor healing right dorsal foot wound. IONA's performed while admitted for recurrent cellulitis showed abnormal waveforms and IONA's. Patient reports about 3 weeks of symptoms, pain is localized to dorsum of her foot. Reports no cramping or pain of the contralateral foot. Is able to ambulate at home for about 10 minutes with walker before experiencing numbness and cramping of her foot. Sensation of her foot remains intact. No fevers, chills, chest pain or shortness of breath at this time. Former smoker, 1/2 PPD and quit in 1999.      PAST MEDICAL & SURGICAL HISTORY:  PVD (peripheral vascular disease)    DM (diabetes mellitus)    Type 2 diabetes mellitus with other specified complication, unspecified whether long term insulin use    Hyperlipidemia, unspecified hyperlipidemia type    Coronary artery disease, angina presence unspecified, unspecified vessel or lesion type, unspecified whether native or transplanted heart    Pulmonary emphysema, unspecified emphysema type    No significant past surgical history        MEDICATIONS  (STANDING):  aspirin enteric coated 325 milliGRAM(s) Oral daily  atorvastatin 80 milliGRAM(s) Oral at bedtime  cilostazol 100 milliGRAM(s) Oral every 12 hours  dextrose 40% Gel 15 Gram(s) Oral once  dextrose 5%. 1000 milliLiter(s) (50 mL/Hr) IV Continuous <Continuous>  dextrose 5%. 1000 milliLiter(s) (100 mL/Hr) IV Continuous <Continuous>  dextrose 50% Injectable 25 Gram(s) IV Push once  dextrose 50% Injectable 25 Gram(s) IV Push once  dextrose 50% Injectable 12.5 Gram(s) IV Push once  enoxaparin Injectable 40 milliGRAM(s) SubCutaneous daily  fenofibrate Tablet 145 milliGRAM(s) Oral daily  glucagon  Injectable 1 milliGRAM(s) IntraMuscular once  insulin glargine Injectable (LANTUS) 15 Unit(s) SubCutaneous at bedtime  insulin lispro (ADMELOG) corrective regimen sliding scale   SubCutaneous three times a day before meals  insulin lispro (ADMELOG) corrective regimen sliding scale   SubCutaneous at bedtime  levothyroxine 88 MICROGram(s) Oral daily  lisinopril 40 milliGRAM(s) Oral daily  metoprolol succinate  milliGRAM(s) Oral daily  piperacillin/tazobactam IVPB.. 3.375 Gram(s) IV Intermittent every 8 hours  ranolazine 500 milliGRAM(s) Oral two times a day  saccharomyces boulardii 250 milliGRAM(s) Oral two times a day    MEDICATIONS  (PRN):  acetaminophen   Tablet .. 650 milliGRAM(s) Oral every 6 hours PRN Temp greater or equal to 38C (100.4F), Mild Pain (1 - 3)  oxycodone    5 mG/acetaminophen 325 mG 1 Tablet(s) Oral every 6 hours PRN Severe Pain (7 - 10)      Allergies    No Known Allergies    Intolerances        SOCIAL HISTORY:    FAMILY HISTORY:  Family history of liver cancer (Mother)    Family history of stomach cancer (Father)        Vital Signs Last 24 Hrs  T(C): 36.8 (10 Feb 2021 23:26), Max: 36.9 (10 Feb 2021 19:56)  T(F): 98.3 (10 Feb 2021 23:26), Max: 98.4 (10 Feb 2021 19:56)  HR: 97 (10 Feb 2021 23:26) (72 - 102)  BP: 121/71 (10 Feb 2021 23:26) (110/67 - 156/72)  BP(mean): --  RR: 18 (10 Feb 2021 23:26) (18 - 22)  SpO2: 95% (10 Feb 2021 23:26) (92% - 95%)    PHYSICAL EXAM:  GEN: NAD, laying in bed, appears stated age  HEENT: NCAT, clear oral mucosa, normal conjunctiva  Chest: non-tender  CV:  non-tachycardic, 2+ radial pulse  Pulm: no increased work of breathing, no conversational dyspnea  GI: soft, obese abdomen  MSK: moving all extremities, RLE with erythema overlaying wound along dorsum of first metatarsal. tender to palpation. Sensatio intact.   Vasc: 1+ femoral pulses b/l. Unable to obtain signals to b/l DP/PT.      LABS:                        11.8   7.91  )-----------( 316      ( 10 Feb 2021 07:45 )             36.9     02-10    139  |  101  |  37.0<H>  ----------------------------<  213<H>  4.2   |  23.0  |  1.30    Ca    10.3<H>      10 Feb 2021 22:42  Phos  3.3     02-10  Mg     1.8     02-10            RADIOLOGY & ADDITIONAL STUDIES

## 2021-02-11 NOTE — PROGRESS NOTE ADULT - ASSESSMENT
81y/o F with a history of diabetes, peripheral vascular disease, COPD, hypothyroidism, coronary artery disease, hypertension, recently hospitalized for cellulitis presents with recurrent right foot cellulitis. MRI on prior admission reporting no OM. X ray 1/20/2021 Three views of the right foot show no evidence of fracture nor destructive change. The joint spaces are maintained. Soft tissue swelling is seen along the dorsum of the foot. Calcification of the plantar fascia is unchanged. Doppler negative for DVT. Had not meeting sepsis criteria; however, blood cultures and wound cultures were taken.     #Right foot cellulitis, Recurrent Cellulitis  -ID recs appreciated  -c/w zosyn  -follow up blood cultures  -podiatry recalled  -RLE elevation    #Left knee joint effusion  - s/p fall 1 month ago and she follows up with an outpt orthopedist Dr Warren Lange. Reports 15cc serous drainage and was due to 3 week f/u appt 1/26/21  - consulted ortho for possible joint aspiration  - f/u knee xray    #DM2  -A1c 1/21 7.7  -well controlled on Lantus 15 qhs  -moderate iss w/ meals and qhs  -will adjust accordingly    #CAD/PVD- IONA's to the bilateral lower extremities are abnormal,   Continued hydration, will need CTA aorta with runoff bilaterally to assess vascular flow to extremities    -c/w aspirin and Cilostazol  -c/w atorvastatin and fenofibrate  -no chest pain, good pulses     #Hypothyroid  -c/w synthroid    #dvt ppx  -lovenox sq    Dispo: pending blood culture and sensitivities, podiatry f/u, possibly home 24-48hrs

## 2021-02-12 NOTE — PROGRESS NOTE ADULT - SUBJECTIVE AND OBJECTIVE BOX
Pt seen and examined and chart and imaging reviewed. Pt with PAD with RLE rest pain. CTA reveals R RONALD stenosis, R SFA stenosis and ? CFA occlusion.    ICU Vital Signs Last 24 Hrs  T(C): 36.6 (12 Feb 2021 17:37), Max: 36.7 (11 Feb 2021 21:33)  T(F): 97.8 (12 Feb 2021 17:37), Max: 98.1 (11 Feb 2021 21:33)  HR: 78 (12 Feb 2021 17:37) (64 - 95)  BP: 145/81 (12 Feb 2021 17:37) (133/64 - 146/63)  BP(mean): --  ABP: --  ABP(mean): --  RR: 19 (12 Feb 2021 17:37) (18 - 20)  SpO2: 93% (12 Feb 2021 17:37) (92% - 95%)      PE:  b/l LE edema,  R foot warm with monophasic doppler signals.   Femoral pulse not palpable    Plan:  - OR Monday for r CFA exposure, possible endarterectomy, iliac and RLE angiogram, possible angioplasty, possible stenting, possible bypass  - Medical cardiac clearance appreciated  - Repeat COVID testing within 48 hrs  - Preop for Monday

## 2021-02-12 NOTE — PROGRESS NOTE ADULT - SUBJECTIVE AND OBJECTIVE BOX
HPI/OVERNIGHT EVENTS: No acute events overnight. Patient underwent CTA of bilateral lower extremities, showing severe long segment stenosis/near occlusion of right common and external iliac arteries (likely chronic in nature), plus multiple areas of moderate stenosis in the remaining arteries of her legs bilaterally. Continues to note some pain along dorsal aspect of right foot. Otherwise no complaints. Tolerating PO. Denies fever, chills, chest pain, SOB, n/v/d, loss of sensation or strength to foot.    MEDICATIONS  (STANDING):  aspirin enteric coated 325 milliGRAM(s) Oral daily  atorvastatin 80 milliGRAM(s) Oral at bedtime  cilostazol 100 milliGRAM(s) Oral every 12 hours  dextrose 40% Gel 15 Gram(s) Oral once  dextrose 5%. 1000 milliLiter(s) (50 mL/Hr) IV Continuous <Continuous>  dextrose 5%. 1000 milliLiter(s) (100 mL/Hr) IV Continuous <Continuous>  dextrose 50% Injectable 25 Gram(s) IV Push once  dextrose 50% Injectable 12.5 Gram(s) IV Push once  dextrose 50% Injectable 25 Gram(s) IV Push once  enoxaparin Injectable 40 milliGRAM(s) SubCutaneous daily  fenofibrate Tablet 145 milliGRAM(s) Oral daily  glucagon  Injectable 1 milliGRAM(s) IntraMuscular once  insulin glargine Injectable (LANTUS) 15 Unit(s) SubCutaneous at bedtime  insulin lispro (ADMELOG) corrective regimen sliding scale   SubCutaneous three times a day before meals  insulin lispro (ADMELOG) corrective regimen sliding scale   SubCutaneous at bedtime  levothyroxine 88 MICROGram(s) Oral daily  lisinopril 40 milliGRAM(s) Oral daily  metoprolol succinate  milliGRAM(s) Oral daily  ranolazine 500 milliGRAM(s) Oral two times a day  saccharomyces boulardii 250 milliGRAM(s) Oral two times a day    MEDICATIONS  (PRN):  acetaminophen   Tablet .. 650 milliGRAM(s) Oral every 6 hours PRN Temp greater or equal to 38C (100.4F), Mild Pain (1 - 3)  oxycodone    5 mG/acetaminophen 325 mG 1 Tablet(s) Oral every 6 hours PRN Severe Pain (7 - 10)      Vital Signs Last 24 Hrs  T(C): 36.7 (2021 21:33), Max: 37.1 (2021 11:25)  T(F): 98.1 (2021 21:33), Max: 98.7 (2021 11:25)  HR: 95 (:) (78 - 96)  BP: 141/67 (2021 17:29) (130/76 - 141/67)  BP(mean): --  RR: 18 (2021 21:33) (18 - 18)  SpO2: 92% (2021 21:33) (92% - 93%)    GEN: NAD, laying in bed, appears stated age  HEENT: NCAT, clear oral mucosa, normal conjunctiva  Chest: non-tender  CV:  non-tachycardic, 2+ radial pulse  Pulm: no increased work of breathing, no conversational dyspnea  GI: soft, nontender, nondistended  MSK: moving all extremities, RLE with erythema overlaying wound along dorsum of first metatarsal. Associated TTP. Strength and sensation intact b/l.   Vasc: 1+ femoral pulses b/l. Unable to obtain signals to b/l DP/PT.      I&O's Detail    10 Feb 2021 07:01  -  2021 07:00  --------------------------------------------------------  IN:    IV PiggyBack: 100 mL    Oral Fluid: 240 mL  Total IN: 340 mL    OUT:  Total OUT: 0 mL    Total NET: 340 mL          LABS:                        11.8   7.91  )-----------( 316      ( 10 Feb 2021 07:45 )             36.9     02-10    139  |  101  |  37.0<H>  ----------------------------<  213<H>  4.2   |  23.0  |  1.30    Ca    10.3<H>      10 Feb 2021 22:42  Phos  3.3     02-10  Mg     1.8     02-10      IMAGIN/11 CTA Abdominal Aorta w/runoff:   1. Severe long segment stenosis/near occlusion of the right common and external iliac arteries is chronic in nature.  2. Multiple areas of moderate stenosis throughout the remaining lower extremity arteries bilaterally.  3. Small bilateral knee joint effusions.

## 2021-02-12 NOTE — PROGRESS NOTE ADULT - ASSESSMENT
81y/o F with a history of diabetes, peripheral vascular disease, COPD, hypothyroidism, coronary artery disease, hypertension, recently hospitalized for cellulitis presents with recurrent right foot cellulitis. MRI on prior admission reporting no OM. X ray 1/20/2021 Three views of the right foot show no evidence of fracture nor destructive change. The joint spaces are maintained. Soft tissue swelling is seen along the dorsum of the foot. Calcification of the plantar fascia is unchanged. Doppler negative for DVT. Had not meeting sepsis criteria; however, blood cultures and wound cultures were taken.     #Right foot cellulitis, Recurrent Cellulitis with severe PAD- vascular   -ID recs appreciated  -c/w zosyn  -negative  blood cultures  -podiatry recalled  -RLE elevation    #Left knee joint effusion  - s/p fall 1 month ago and she follows up with an outpt orthopedist Dr Warren Lange. Reports 15cc serous drainage and was due to 3 week f/u appt 1/26/21  - consulted ortho     #DM2  -A1c 1/21 7.7  -well controlled on Lantus 15 qhs  -moderate iss w/ meals and qhs  -will adjust accordingly    #CAD/PVD- IONA's to the bilateral lower extremities are abnormal,   Continued hydration, will need CTA aorta with runoff bilaterally to assess vascular flow to extremities    -c/w aspirin and Cilostazol  -c/w atorvastatin and fenofibrate  -no chest pain, good pulses     #Hypothyroid  -c/w synthroid    #dvt ppx  -lovenox sq    Dispo: pending blood culture and sensitivities, podiatry f/u, possibly home 24-48hrs

## 2021-02-12 NOTE — CONSULT NOTE ADULT - ASSESSMENT
80y Female with prior history of prior stent to RCA with occlusion to be managed medically, HTN, PAD, L subclavian stenosis    CAD  - Continue ASA    HTN  - BP controlled    Dyslipidemia  - Continue statin    AF  - Rates controlled  - Anticoagulation    Shortness of breath  - Likely multifactorial    Likely Non-MI troponin elevation secondary to ***- Would trend for now    Edema    - Check Echo to assess LV function   - Serial cardiac enzymes  - Telemetry monitoring    Plan discussed with ER physician/Medicine team 80y Female with prior history of prior stent to RCA with occlusion to be managed medically, HTN, PAD, L subclavian stenosis, mild-moderate AS recently hospitalized for cellulitis presents with recurrent cellulitis. IONA's performed while admitted for recurrent cellulitis showed abnormal waveforms and IONA's. Patient reports about 3 weeks of symptoms, pain is localized to dorsum of her foot. Significant PVD of right common and external iliac arteries noted on CTA. Planned for possible intervention.    Pre-op cardiovascular evaluation  - Prior Echo with preserved LV function and moderate MR, Prior cath with known RCA occlusion to be managed medically  - No symptoms of angina at baseline  - Can proceed for planned surgery with moderate perioperative risk without cardiac contraindications  - Close monitoring of BP and volume status  - Post-op ECG and telemetry monitoring  - Monitor I/O closely if pt receiving fluids for contrast studies given risk of volume overload    CAD, PAD  - Continue ASA with cilostazol  - On Ranexa without obvious angina symptoms  - Monitor renal function closely    HTN  - BP controlled    Dyslipidemia  - Continue statin

## 2021-02-12 NOTE — PROGRESS NOTE ADULT - SUBJECTIVE AND OBJECTIVE BOX
HPI:  80F with a history of diabetes, peripheral vascular disease, COPD, hypothyroidism, coronary artery disease, hypertension, recently hospitalized for cellulitis presents with recurrent cellulitis. Pt stated started having redness and pain on right foot after getting an abrasion in the beginning of January. She was admitted from 1/21 to 1/24 and received vancomycin with good response. She was discharged on Keflex for 7 days. She stated redness and pain initially improved, but came back again 1 week ago after abx completed. She tried to get topical from PCP, but continued to worsen and now returned to hospital. Complains of pain mainly on dorsal foot and heal. Denies fever, chills, chest pain, abdominal pain, N/v/d.     In ER, patient received vancomycin and zosyn. Blood and wound cultures taken.  (08 Feb 2021 00:03)      History as above: Patient seen bedside this PM sitting in chair eating.  Patient states that she has continued pain in her RLE. She states there are plans for vascular intervention monday and she is in agreement. She denies n/v/f/sob/cp/c.      Medications acetaminophen   Tablet .. 650 milliGRAM(s) Oral every 6 hours PRN  aspirin enteric coated 325 milliGRAM(s) Oral daily  atorvastatin 80 milliGRAM(s) Oral at bedtime  cilostazol 100 milliGRAM(s) Oral every 12 hours  dextrose 40% Gel 15 Gram(s) Oral once  dextrose 5%. 1000 milliLiter(s) IV Continuous <Continuous>  dextrose 5%. 1000 milliLiter(s) IV Continuous <Continuous>  dextrose 50% Injectable 25 Gram(s) IV Push once  dextrose 50% Injectable 12.5 Gram(s) IV Push once  dextrose 50% Injectable 25 Gram(s) IV Push once  enoxaparin Injectable 40 milliGRAM(s) SubCutaneous daily  fenofibrate Tablet 145 milliGRAM(s) Oral daily  glucagon  Injectable 1 milliGRAM(s) IntraMuscular once  insulin glargine Injectable (LANTUS) 15 Unit(s) SubCutaneous at bedtime  insulin lispro (ADMELOG) corrective regimen sliding scale   SubCutaneous three times a day before meals  insulin lispro (ADMELOG) corrective regimen sliding scale   SubCutaneous at bedtime  levothyroxine 88 MICROGram(s) Oral daily  lisinopril 40 milliGRAM(s) Oral daily  metoprolol succinate  milliGRAM(s) Oral daily  oxycodone    5 mG/acetaminophen 325 mG 1 Tablet(s) Oral every 6 hours PRN  ranolazine 500 milliGRAM(s) Oral two times a day  saccharomyces boulardii 250 milliGRAM(s) Oral two times a day    FHFamily history of liver cancer (Mother)    Family history of stomach cancer (Father)    ,   PMHPVD (peripheral vascular disease)    DM (diabetes mellitus)    Type 2 diabetes mellitus with other specified complication, unspecified whether long term insulin use    Hyperlipidemia, unspecified hyperlipidemia type    Coronary artery disease, angina presence unspecified, unspecified vessel or lesion type, unspecified whether native or transplanted heart    Pulmonary emphysema, unspecified emphysema type       PSHNo significant past surgical history        Labs       02-10    139  |  101  |  37.0<H>  ----------------------------<  213<H>  4.2   |  23.0  |  1.30    Ca    10.3<H>      10 Feb 2021 22:42  Phos  3.3     02-10  Mg     1.8     02-10       Vital Signs Last 24 Hrs  T(C): 36.6 (12 Feb 2021 17:37), Max: 36.7 (11 Feb 2021 21:33)  T(F): 97.8 (12 Feb 2021 17:37), Max: 98.1 (11 Feb 2021 21:33)  HR: 78 (12 Feb 2021 17:37) (64 - 95)  BP: 145/81 (12 Feb 2021 17:37) (133/64 - 146/63)  BP(mean): --  RR: 19 (12 Feb 2021 17:37) (18 - 20)  SpO2: 93% (12 Feb 2021 17:37) (92% - 95%)  Sedimentation Rate, Erythrocyte: 30 mm/hr (01-20-21 @ 14:11)         C-Reactive Protein, Serum: 0.64 mg/dL (01-20-21 @ 14:11)     < from: [facility] Physiol Extremity Lower 3+ Level, BI (02.10.21 @ 17:49) >     EXAM:  US PHYSIOL LWR EXT 3+ LEV BI                          PROCEDURE DATE:  02/10/2021          INTERPRETATION:  Clinical indications: Nonhealing right foot ulcer    COMPARISON: None    FINDINGS:    Technically limited study due to patient body habitus.    There are markedly abnormal monophasic waveforms bilaterally, dampened at the level of the ankles, metatarsals. No discernible waveforms at the digits.    Left brachial artery pressure is abnormally low. There are very faint dorsalis pedisand posterior tibial artery pulses bilaterally.    There is an abnormal segmental pressure gradient in the right popliteal region as well as within the calves compatible with segmental disease.    Right IONA = 0.16  Left IONA = 0.38    IMPRESSION:    Markedly abnormal study, as described above. ABIs compatible with severe bilateral peripheral vascular disease.    There are very faint dorsalis pedis and posterior tibial artery pulses bilaterally.    Abnormally low left brachial artery pressure. Correlate clinically.    Above findings were discussed with Dr. Alcala at 6:30 PM on 02/10/2021.    EMILY ABAD MD; Attending Radiologist  This document has been electronically signed. Feb 10 2021  7:38PM    < end of copied text >          O:   General: Pleasant  female NAD & AOX3.    Integument:  Skin warm, dry and supple bilateral.    Derm: erythema noted to dorsal aspect of the right forefoot - TG cold at the level of the digits R>L  Vascular: Dorsalis Pedis and Posterior Tibial pulses 1/4.  Capillary re-fill time less then 3 seconds digits 1-5 bilateral.    Neuro: Protective sensation diminished to the level of the digits bilateral.  MSK: Muscle strength 5/5 all major muscle groups bilateral.    A: Right foot cellulitis vs PVD  tinea pedis      P:   Chart reviewed and Patient evaluated  Discussed diagnosis and treatment with patient  Given the temperature of  the digits and weak pulse - most likely PVD in nature  IONA/PVR reviewed: IONA 0.18 on Right  F/u vascular findings  X-rays reviewed neg  Continue with antibiotics As Per ID  Weight bearing full  Offloading to bilateral Heels in bed   No need for further podiatric intervention during this hospital admission  Rx topical clotrimazole to be applied daily to b/l feet  Examined with Attending Dr Peters

## 2021-02-12 NOTE — PROGRESS NOTE ADULT - SUBJECTIVE AND OBJECTIVE BOX
JAMAAL HERR Patient is a 80y old  Female who presents with a chief complaint of cellulitis (12 Feb 2021 11:21)     HPI:  80F with a history of diabetes, peripheral vascular disease, COPD, hypothyroidism, coronary artery disease, hypertension, recently hospitalized for cellulitis presents with recurrent cellulitis. Pt stated started having redness and pain on right foot after getting an abrasion in the beginning of January. She was admitted from 1/21 to 1/24 and received vancomycin with good response. She was discharged on Keflex for 7 days. She stated redness and pain initially improved, but came back again 1 week ago after abx completed. She tried to get topical from PCP, but continued to worsen and now returned to hospital. Complains of pain mainly on dorsal foot and heal. Denies fever, chills, chest pain, abdominal pain, N/v/d.     In ER, patient received vancomycin and zosyn. Blood and wound cultures taken.  (08 Feb 2021 00:03)    The patient was seen and evaluated still with redness and tenderness of the leg and foot ,understands the need for revascularization  The patient is in no acute distress. is emotional and worried about recovery vesna because her  has dementia and she is       I&O's Summary    11 Feb 2021 07:01  -  12 Feb 2021 07:00  --------------------------------------------------------  IN: 1100 mL / OUT: 0 mL / NET: 1100 mL      Allergies    No Known Allergies    Intolerances      HEALTH ISSUES - PROBLEM Dx:        PAST MEDICAL & SURGICAL HISTORY:  PVD (peripheral vascular disease)    DM (diabetes mellitus)    Type 2 diabetes mellitus with other specified complication, unspecified whether long term insulin use    Hyperlipidemia, unspecified hyperlipidemia type    Coronary artery disease, angina presence unspecified, unspecified vessel or lesion type, unspecified whether native or transplanted heart    Pulmonary emphysema, unspecified emphysema type    No significant past surgical history    Vital Signs Last 24 Hrs  T(C): 36.5 (12 Feb 2021 11:20), Max: 36.7 (11 Feb 2021 17:29)  T(F): 97.7 (12 Feb 2021 11:20), Max: 98.1 (11 Feb 2021 21:33)  HR: 64 (12 Feb 2021 11:20) (64 - 95)  BP: 133/64 (12 Feb 2021 11:20) (133/64 - 146/63)  BP(mean): --  RR: 20 (12 Feb 2021 11:20) (18 - 20)  SpO2: 92% (12 Feb 2021 11:20) (92% - 95%)T(C): 36.5 (02-12-21 @ 11:20), Max: 36.7 (02-11-21 @ 17:29)  HR: 64 (02-12-21 @ 11:20) (64 - 95)  BP: 133/64 (02-12-21 @ 11:20) (133/64 - 146/63)  RR: 20 (02-12-21 @ 11:20) (18 - 20)  SpO2: 92% (02-12-21 @ 11:20) (92% - 95%)  Wt(kg): --    PHYSICAL EXAM:    GENERAL: NAD, well-groomed, elderly  HEAD:  Atraumatic, Normocephalic  EYES: EOMI, PERRL  NERVOUS SYSTEM:  Alert & Oriented X3,  Moves upper and lower extremities; CNS-II-XII  CHEST/LUNG: Clear to auscultation bilaterally; No rales, rhonchi, wheezing,   HEART: Regular rate and rhythm; No murmurs,   ABDOMEN: Soft, Nontender, Nondistended; Bowel sounds present  EXTREMITIES:  Peripheral Pulses, No  cyanosis, or edema  SKIN: redness and tenderness of the right leg and foot   psychiatry- mood and affect approprite, Insight and judgement intact     acetaminophen   Tablet .. 650 milliGRAM(s) Oral every 6 hours PRN  aspirin enteric coated 325 milliGRAM(s) Oral daily  atorvastatin 80 milliGRAM(s) Oral at bedtime  cilostazol 100 milliGRAM(s) Oral every 12 hours  dextrose 40% Gel 15 Gram(s) Oral once  dextrose 5%. 1000 milliLiter(s) IV Continuous <Continuous>  dextrose 5%. 1000 milliLiter(s) IV Continuous <Continuous>  dextrose 50% Injectable 25 Gram(s) IV Push once  dextrose 50% Injectable 12.5 Gram(s) IV Push once  dextrose 50% Injectable 25 Gram(s) IV Push once  enoxaparin Injectable 40 milliGRAM(s) SubCutaneous daily  fenofibrate Tablet 145 milliGRAM(s) Oral daily  glucagon  Injectable 1 milliGRAM(s) IntraMuscular once  insulin glargine Injectable (LANTUS) 15 Unit(s) SubCutaneous at bedtime  insulin lispro (ADMELOG) corrective regimen sliding scale   SubCutaneous three times a day before meals  insulin lispro (ADMELOG) corrective regimen sliding scale   SubCutaneous at bedtime  levothyroxine 88 MICROGram(s) Oral daily  lisinopril 40 milliGRAM(s) Oral daily  metoprolol succinate  milliGRAM(s) Oral daily  oxycodone    5 mG/acetaminophen 325 mG 1 Tablet(s) Oral every 6 hours PRN  ranolazine 500 milliGRAM(s) Oral two times a day  saccharomyces boulardii 250 milliGRAM(s) Oral two times a day      LABS:      02-10    139  |  101  |  37.0<H>  ----------------------------<  213<H>  4.2   |  23.0  |  1.30    Ca    10.3<H>      10 Feb 2021 22:42  Phos  3.3     02-10  Mg     1.8     02-10          CARDIAC MARKERS ( 10 Feb 2021 22:42 )  x     / <0.01 ng/mL / x     / x     / x            CAPILLARY BLOOD GLUCOSE      POCT Blood Glucose.: 204 mg/dL (12 Feb 2021 11:27)  POCT Blood Glucose.: 166 mg/dL (12 Feb 2021 08:13)  POCT Blood Glucose.: 150 mg/dL (11 Feb 2021 21:38)  POCT Blood Glucose.: 139 mg/dL (11 Feb 2021 17:10)      RADIOLOGY & ADDITIONAL TESTS:      Consultant notes reviewed    Case discussed with consultant/provider/ family /patient

## 2021-02-12 NOTE — PROGRESS NOTE ADULT - ASSESSMENT
A/P: 80 year old female with peripheral vascular disease s/p remote bypass in 2003 of the femoral artery, admitted for cellulitis of RLE, with Surgery following for concern of rest pain. Significant PVD of right common and external iliac arteries noted on CTA. No additional changes since initial evaluation.     -Follow-up renal function s/p CTA  -Plan for intervention to be determined pending attending discussion  -Remainder of management per primary team

## 2021-02-12 NOTE — CONSULT NOTE ADULT - SUBJECTIVE AND OBJECTIVE BOX
[facility], [facility]                              [address] [place] [address]                                                 PHONE: [phone]                                                 FAX: [phone]  ------------------------------------------------------------------------------------------------    80y Female with past medical history as under  At the time of evaluation, pt     PAST MEDICAL & SURGICAL HISTORY:  PVD (peripheral vascular disease)    DM (diabetes mellitus)    Type 2 diabetes mellitus with other specified complication, unspecified whether long term insulin use    Hyperlipidemia, unspecified hyperlipidemia type    Coronary artery disease, angina presence unspecified, unspecified vessel or lesion type, unspecified whether native or transplanted heart    Pulmonary emphysema, unspecified emphysema type    No significant past surgical history        No Known Allergies      Review of Systems:  Positive for chest pain, shortness of breath, dyspnea on exertion, edema  Rest of the systems were reviewed and was negative.     Family history:   No pertinent family history in first degree relative of early CAD, sudden cardiac death or  congenital heart disease    Social History:  No smoking   No alcohol  No other drug use    Vital Signs Last 24 Hrs  T(C): 36.6 (12 Feb 2021 05:04), Max: 37.1 (11 Feb 2021 11:25)  T(F): 97.9 (12 Feb 2021 05:04), Max: 98.7 (11 Feb 2021 11:25)  HR: 91 (12 Feb 2021 05:04) (78 - 95)  BP: 146/63 (12 Feb 2021 05:04) (130/76 - 146/63)  BP(mean): --  RR: 18 (12 Feb 2021 05:04) (18 - 18)  SpO2: 95% (12 Feb 2021 05:04) (92% - 95%)    PHYSICAL EXAM:  Constitutional: Appears well developed, well nourished; alert and co-operative  HEENT:     Head: Normocephalic and atraumatic  Eyes: Conjunctiva normal, No scleral icterus  Neck: Supple, No JVD  Mouth/Throat: Mucous membranes are normal. Mucosa are not pale or dry.  Cardiovascular: regular S1, S2  Respiratory: Lungs clear to auscultation; no crepitations, no wheeze  Gastrointestinal:  Soft, Non-tender, + BS	  Musculoskeletal: Normal range of motion. No edema  Skin: Warm and dry. No cyanosis . No diaphoresis.  Neurologic: Alert oriented to time place and person. Normal strength and no tremor.  Psychiatric: Normal mood and affect, Speech is normal and behavior is normal.        LABS:    02-10    139  |  101  |  37.0<H>  ----------------------------<  213<H>  4.2   |  23.0  |  1.30    Ca    10.3<H>      10 Feb 2021 22:42  Phos  3.3     02-10  Mg     1.8     02-10      CARDIAC MARKERS ( 10 Feb 2021 22:42 )  x     / <0.01 ng/mL / x     / x     / x              RADIOLOGY & ADDITIONAL STUDIES: (reviewed)  CXR was independently visualized/reviewed and demonstrated:     CARDIOLOGY TESTING:(reviewed)     ECG (Independent visualization):    ECHOCARDIOGRAM :    STRESS TEST:    CARDIAC CATHETERIZATION:6/2018: Normal LAD, LACX with 100% stenosis of the distal RCA at site of prior stent. Medical therapy was recommended.    MEDICATIONS:(reviewed)  Home Medications:    MEDICATIONS  (STANDING):  aspirin enteric coated 325 milliGRAM(s) Oral daily  atorvastatin 80 milliGRAM(s) Oral at bedtime  cilostazol 100 milliGRAM(s) Oral every 12 hours  dextrose 40% Gel 15 Gram(s) Oral once  dextrose 5%. 1000 milliLiter(s) (50 mL/Hr) IV Continuous <Continuous>  dextrose 5%. 1000 milliLiter(s) (100 mL/Hr) IV Continuous <Continuous>  dextrose 50% Injectable 25 Gram(s) IV Push once  dextrose 50% Injectable 12.5 Gram(s) IV Push once  dextrose 50% Injectable 25 Gram(s) IV Push once  enoxaparin Injectable 40 milliGRAM(s) SubCutaneous daily  fenofibrate Tablet 145 milliGRAM(s) Oral daily  glucagon  Injectable 1 milliGRAM(s) IntraMuscular once  insulin glargine Injectable (LANTUS) 15 Unit(s) SubCutaneous at bedtime  insulin lispro (ADMELOG) corrective regimen sliding scale   SubCutaneous three times a day before meals  insulin lispro (ADMELOG) corrective regimen sliding scale   SubCutaneous at bedtime  levothyroxine 88 MICROGram(s) Oral daily  lisinopril 40 milliGRAM(s) Oral daily  metoprolol succinate  milliGRAM(s) Oral daily  ranolazine 500 milliGRAM(s) Oral two times a day  saccharomyces boulardii 250 milliGRAM(s) Oral two times a day    MEDICATIONS  (PRN):  acetaminophen   Tablet .. 650 milliGRAM(s) Oral every 6 hours PRN Temp greater or equal to 38C (100.4F), Mild Pain (1 - 3)  oxycodone    5 mG/acetaminophen 325 mG 1 Tablet(s) Oral every 6 hours PRN Severe Pain (7 - 10)                                                    [facility], [facility]                              [address] [place] [address]                                                 PHONE: [phone]                                                 FAX: [phone]  ------------------------------------------------------------------------------------------------    80y Female with past medical history as under recently hospitalized for cellulitis presents with recurrent cellulitis. IONA's performed while admitted for recurrent cellulitis showed abnormal waveforms and IONA's. Patient reports about 3 weeks of symptoms, pain is localized to dorsum of her foot. Significant PVD of right common and external iliac arteries noted on CTA. Planned for possible intervention.  At the time of evaluation, pt is laying in bed and reports increased phlegm production. She also reports increased pain in her foot.   She follows with Dr. Paz and was last seen in 12/2020.    PAST MEDICAL & SURGICAL HISTORY:  PVD (peripheral vascular disease)    DM (diabetes mellitus)    Type 2 diabetes mellitus with other specified complication, unspecified whether long term insulin use    Hyperlipidemia, unspecified hyperlipidemia type    Coronary artery disease, angina presence unspecified, unspecified vessel or lesion type, unspecified whether native or transplanted heart    Pulmonary emphysema, unspecified emphysema type    No significant past surgical history    No Known Allergies      Review of Systems:  Positive for cough, foot pain  Rest of the systems were reviewed and was negative.     Family history:   No pertinent family history in first degree relative of early CAD, sudden cardiac death or  congenital heart disease    Social History:  No smoking   No alcohol  No other drug use    Vital Signs Last 24 Hrs  T(C): 36.6 (12 Feb 2021 05:04), Max: 37.1 (11 Feb 2021 11:25)  T(F): 97.9 (12 Feb 2021 05:04), Max: 98.7 (11 Feb 2021 11:25)  HR: 91 (12 Feb 2021 05:04) (78 - 95)  BP: 146/63 (12 Feb 2021 05:04) (130/76 - 146/63)  BP(mean): --  RR: 18 (12 Feb 2021 05:04) (18 - 18)  SpO2: 95% (12 Feb 2021 05:04) (92% - 95%)    PHYSICAL EXAM:  Constitutional: Elderly woman in bed; alert and co-operative  HEENT:     Head: Normocephalic and atraumatic  Eyes: Conjunctiva normal, No scleral icterus  Neck: Supple, No JVD  Mouth/Throat: Mucous membranes are normal. Mucosa are not pale or dry.  Cardiovascular: regular S1, S2  + ESM  Respiratory: Lungs clear to auscultation; no crepitations, no wheeze  Gastrointestinal:  Soft, Non-tender, + BS	  Musculoskeletal: Normal range of motion. No edema  Skin: + erythema of foot.  Neurologic: Alert oriented to time place and person. Normal strength and no tremor.  Psychiatric: Normal mood and affect, Speech is normal and behavior is normal.        LABS:    02-10    139  |  101  |  37.0<H>  ----------------------------<  213<H>  4.2   |  23.0  |  1.30    Ca    10.3<H>      10 Feb 2021 22:42  Phos  3.3     02-10  Mg     1.8     02-10      CARDIAC MARKERS ( 10 Feb 2021 22:42 )  x     / <0.01 ng/mL / x     / x     / x              RADIOLOGY & ADDITIONAL STUDIES: (reviewed)  CT was independently visualized/reviewed and demonstrated:   2/11 CTA Abdominal Aorta w/runoff:   1. Severe long segment stenosis/near occlusion of the right common and external iliac arteries is chronic in nature.  2. Multiple areas of moderate stenosis throughout the remaining lower extremity arteries bilaterally.    CARDIOLOGY TESTING:(reviewed)     ECG (Independent visualization): NSR with ILBBB    Echo 3/2020: LVEF 55%, moderate MR, mild AS, mild TR    CARDIAC CATHETERIZATION:6/2018: Normal LAD, LACX with 100% stenosis of the distal RCA at site of prior stent. Medical therapy was recommended.    MEDICATIONS:(reviewed)  Home Medications:  Home Medications:  aspirin 325 mg oral delayed release tablet: 1 tab(s) orally once a day (08 Feb 2021 01:54)  atorvastatin 80 mg oral tablet: 1 tab(s) orally once a day (at bedtime) (08 Feb 2021 01:54)  cilostazol 100 mg oral tablet: 1 tab(s) orally every 12 hours (08 Feb 2021 01:54)  fenofibrate 145 mg oral tablet: 1 tab(s) orally once a day (08 Feb 2021 01:54)  levothyroxine 88 mcg (0.088 mg) oral tablet: 1 tab(s) orally once a day (08 Feb 2021 01:54)  lisinopril 40 mg oral tablet: 1 tab(s) orally once a day (08 Feb 2021 01:54)  Metoprolol Succinate  mg oral tablet, extended release: 1 tab(s) orally once a day (08 Feb 2021 01:54)  ranolazine 500 mg oral tablet, extended release: 1 tab(s) orally 2 times a day (08 Feb 2021 01:54)  Tresiba 100 units/mL subcutaneous solution: 22 unit(s) subcutaneous once a day (at bedtime) (08 Feb 2021 01:54)  Trulicity Pen 1.5 mg/0.5 mL subcutaneous solution: 1 application subcutaneous once a week (08 Feb 2021 01:54)      MEDICATIONS  (STANDING):  aspirin enteric coated 325 milliGRAM(s) Oral daily  atorvastatin 80 milliGRAM(s) Oral at bedtime  cilostazol 100 milliGRAM(s) Oral every 12 hours  dextrose 40% Gel 15 Gram(s) Oral once  dextrose 5%. 1000 milliLiter(s) (50 mL/Hr) IV Continuous <Continuous>  dextrose 5%. 1000 milliLiter(s) (100 mL/Hr) IV Continuous <Continuous>  dextrose 50% Injectable 25 Gram(s) IV Push once  dextrose 50% Injectable 12.5 Gram(s) IV Push once  dextrose 50% Injectable 25 Gram(s) IV Push once  enoxaparin Injectable 40 milliGRAM(s) SubCutaneous daily  fenofibrate Tablet 145 milliGRAM(s) Oral daily  glucagon  Injectable 1 milliGRAM(s) IntraMuscular once  insulin glargine Injectable (LANTUS) 15 Unit(s) SubCutaneous at bedtime  insulin lispro (ADMELOG) corrective regimen sliding scale   SubCutaneous three times a day before meals  insulin lispro (ADMELOG) corrective regimen sliding scale   SubCutaneous at bedtime  levothyroxine 88 MICROGram(s) Oral daily  lisinopril 40 milliGRAM(s) Oral daily  metoprolol succinate  milliGRAM(s) Oral daily  ranolazine 500 milliGRAM(s) Oral two times a day  saccharomyces boulardii 250 milliGRAM(s) Oral two times a day    MEDICATIONS  (PRN):  acetaminophen   Tablet .. 650 milliGRAM(s) Oral every 6 hours PRN Temp greater or equal to 38C (100.4F), Mild Pain (1 - 3)  oxycodone    5 mG/acetaminophen 325 mG 1 Tablet(s) Oral every 6 hours PRN Severe Pain (7 - 10)

## 2021-02-13 NOTE — PROGRESS NOTE ADULT - SUBJECTIVE AND OBJECTIVE BOX
[facility] - [facility], [facility]                                   [address] [place] [address]                                                      PHONE: [phone]                                                         FAX: [phone]  [email or web address]  ---------------------------------------------------------------------------------------------------------------------------------    Overnight events/patient complaints: patient seen at bedside. She denies chest pain or SOB.       No Known Allergies    MEDICATIONS  (STANDING):  aspirin enteric coated 325 milliGRAM(s) Oral daily  atorvastatin 80 milliGRAM(s) Oral at bedtime  cilostazol 100 milliGRAM(s) Oral every 12 hours  dextrose 40% Gel 15 Gram(s) Oral once  dextrose 5%. 1000 milliLiter(s) (50 mL/Hr) IV Continuous <Continuous>  dextrose 5%. 1000 milliLiter(s) (100 mL/Hr) IV Continuous <Continuous>  dextrose 50% Injectable 25 Gram(s) IV Push once  dextrose 50% Injectable 12.5 Gram(s) IV Push once  dextrose 50% Injectable 25 Gram(s) IV Push once  enoxaparin Injectable 40 milliGRAM(s) SubCutaneous daily  fenofibrate Tablet 145 milliGRAM(s) Oral daily  glucagon  Injectable 1 milliGRAM(s) IntraMuscular once  insulin glargine Injectable (LANTUS) 15 Unit(s) SubCutaneous at bedtime  insulin lispro (ADMELOG) corrective regimen sliding scale   SubCutaneous three times a day before meals  insulin lispro (ADMELOG) corrective regimen sliding scale   SubCutaneous at bedtime  levothyroxine 88 MICROGram(s) Oral daily  lisinopril 40 milliGRAM(s) Oral daily  metoprolol succinate  milliGRAM(s) Oral daily  ranolazine 500 milliGRAM(s) Oral two times a day  saccharomyces boulardii 250 milliGRAM(s) Oral two times a day    MEDICATIONS  (PRN):  acetaminophen   Tablet .. 650 milliGRAM(s) Oral every 6 hours PRN Temp greater or equal to 38C (100.4F), Mild Pain (1 - 3)  oxycodone    5 mG/acetaminophen 325 mG 1 Tablet(s) Oral every 6 hours PRN Severe Pain (7 - 10)      Vital Signs Last 24 Hrs  T(C): 36.5 (13 Feb 2021 04:13), Max: 36.6 (12 Feb 2021 17:37)  T(F): 97.7 (13 Feb 2021 04:13), Max: 97.9 (12 Feb 2021 22:01)  HR: 94 (13 Feb 2021 04:13) (64 - 94)  BP: 153/70 (13 Feb 2021 04:13) (133/64 - 153/70)  BP(mean): --  RR: 19 (13 Feb 2021 04:13) (19 - 20)  SpO2: 95% (13 Feb 2021 04:13) (92% - 95%)  ICU Vital Signs Last 24 Hrs  JAMAAL HERR  I&O's Detail    Drug Dosing Weight  JAMAAL HERR      PHYSICAL EXAM:  General: Appears well developed, well nourished alert and cooperative.  HEENT: Head; normocephalic, atraumatic.  Eyes: Pupils reactive, cornea wnl.  Neck: Supple, no nodes adenopathy, no NVD or carotid bruit or thyromegaly.  CARDIOVASCULAR: Normal S1 and S2, No murmur, rub, gallop or lift.   LUNGS: No rales, rhonchi or wheeze. Normal breath sounds bilaterally.  ABDOMEN: Soft, nontender without mass or organomegaly. bowel sounds normoactive.  EXTREMITIES: No clubbing, cyanosis or edema. Distal pulses wnl.   SKIN: warm and dry with normal turgor.  NEURO: Alert/oriented x 3/normal motor exam. No pathologic reflexes.    PSYCH: normal affect.        LABS:                        12.5   8.33  )-----------( 349      ( 13 Feb 2021 09:04 )             39.2         ASSESSMENT AND PLAN:  80y Female with prior history of prior stent to RCA with occlusion to be managed medically, HTN, PAD, L subclavian stenosis, mild-moderate AS recently hospitalized for cellulitis presents with recurrent cellulitis. IONA's performed while admitted for recurrent cellulitis showed abnormal waveforms and IONA's. Patient reports about 3 weeks of symptoms, pain is localized to dorsum of her foot. Significant PVD of right common and external iliac arteries noted on CTA. Planned for possible intervention.    Pre-op cardiovascular evaluation, CAD, PAD -- she is planned for right lower extremity angiogram, stenting, and possible bypass on Monday 2/15/21. Prior Echo with preserved LV function and moderate MR, Prior cath with known RCA occlusion to be managed medically  - there are no active severe cardiac conditions at this time. Based on her medical history and functional status, she is moderate risk for perioperative cardiovascular complications for a non-low risk surgery, but is currently optimized and can proceed without further cardiac work up.   - Continue ASA with cilostazol and high dose statin therapy  - continue Toprol XL, Lisinopril, and Ranexa    Will follow with you.

## 2021-02-13 NOTE — PROGRESS NOTE ADULT - ASSESSMENT
The patient is a 80 year old female with a past medical history of diabetes mellitus, PVD, COPD, hypothyroidism, CAD and hypertension recently hospitalized and treated for right foot cellulitis. MRI of the foot was negative for OM who now presented for worsening erythema and pain of the right foot. Admitted for right foot cellulitis    Assessment/plan:    1. Right foot cellulitis with severe PAD:   Blood cultures negative x 2  Wound cultures + Staph aureus  Treated with IV zosyn per ID  Per podiatry changed secondary to PVD    2. Severe PAD; On Aspirin, cilostazol and statin  Plan for angio on MOnday by vascular surgery- cardiology clearance noted    3. Diabetes mellitus type 2; Continue lantus  Add pre-meal insulin  MOnitor BSL    4. Left knee effusion: seen by ortho, to follow up as outpatient.       VTE- Lovenox subcut

## 2021-02-13 NOTE — PROGRESS NOTE ADULT - ASSESSMENT
A/P: 80 year old female with peripheral vascular disease s/p remote bypass in 2003 of the femoral artery, admitted for cellulitis of RLE, with Surgery following for concern of rest pain. Significant PVD of right common and external iliac arteries noted on CTA. No additional changes since initial evaluation.     -Follow-up renal function s/p CTA  -Plan for angiogram 2/17  - no cardiac contraindications for procedure  - c/w abx per ID  -Remainder of management per primary team

## 2021-02-13 NOTE — PROGRESS NOTE ADULT - SUBJECTIVE AND OBJECTIVE BOX
CC: Follow up    INTERVAL HPI/OVERNIGHT EVENTS: Patient seen and examined,       Vital Signs Last 24 Hrs  T(C): 36.5 (13 Feb 2021 04:13), Max: 36.6 (12 Feb 2021 17:37)  T(F): 97.7 (13 Feb 2021 04:13), Max: 97.9 (12 Feb 2021 22:01)  HR: 94 (13 Feb 2021 04:13) (78 - 94)  BP: 153/70 (13 Feb 2021 04:13) (145/81 - 153/70)  BP(mean): --  RR: 19 (13 Feb 2021 04:13) (19 - 19)  SpO2: 95% (13 Feb 2021 04:13) (92% - 95%)    PHYSICAL EXAM:    GENERAL: NAD, AOX3  HEAD:  Atraumatic, Normocephalic  EYES: EOMI, PERRLA, conjunctiva and sclera clear  ENMT: Moist mucous membranes  CHEST/LUNG: Clear to auscultation bilaterally; No rales, rhonchi, wheezing, or rubs  HEART: Regular rate and rhythm; No murmurs, rubs, or gallops  ABDOMEN: Soft, Nontender, Nondistended; Bowel sounds present  EXTREMITIES:  2+ Peripheral Pulses, No clubbing, cyanosis, or edema        MEDICATIONS  (STANDING):  aspirin enteric coated 325 milliGRAM(s) Oral daily  atorvastatin 80 milliGRAM(s) Oral at bedtime  cilostazol 100 milliGRAM(s) Oral every 12 hours  dextrose 40% Gel 15 Gram(s) Oral once  dextrose 5%. 1000 milliLiter(s) (50 mL/Hr) IV Continuous <Continuous>  dextrose 5%. 1000 milliLiter(s) (100 mL/Hr) IV Continuous <Continuous>  dextrose 50% Injectable 25 Gram(s) IV Push once  dextrose 50% Injectable 12.5 Gram(s) IV Push once  dextrose 50% Injectable 25 Gram(s) IV Push once  enoxaparin Injectable 40 milliGRAM(s) SubCutaneous daily  fenofibrate Tablet 145 milliGRAM(s) Oral daily  glucagon  Injectable 1 milliGRAM(s) IntraMuscular once  insulin glargine Injectable (LANTUS) 15 Unit(s) SubCutaneous at bedtime  insulin lispro (ADMELOG) corrective regimen sliding scale   SubCutaneous three times a day before meals  insulin lispro (ADMELOG) corrective regimen sliding scale   SubCutaneous at bedtime  levothyroxine 88 MICROGram(s) Oral daily  lisinopril 40 milliGRAM(s) Oral daily  metoprolol succinate  milliGRAM(s) Oral daily  ranolazine 500 milliGRAM(s) Oral two times a day  saccharomyces boulardii 250 milliGRAM(s) Oral two times a day    MEDICATIONS  (PRN):  acetaminophen   Tablet .. 650 milliGRAM(s) Oral every 6 hours PRN Temp greater or equal to 38C (100.4F), Mild Pain (1 - 3)  oxycodone    5 mG/acetaminophen 325 mG 1 Tablet(s) Oral every 6 hours PRN Severe Pain (7 - 10)      Allergies    No Known Allergies    Intolerances          LABS:                          12.5   8.33  )-----------( 349      ( 13 Feb 2021 09:04 )             39.2     02-13    141  |  103  |  34.0<H>  ----------------------------<  165<H>  4.2   |  22.0  |  0.90    Ca    10.1      13 Feb 2021 09:04            RADIOLOGY & ADDITIONAL TESTS:   CC: Follow up    INTERVAL HPI/OVERNIGHT EVENTS: Patient seen and examined, no acute complaints overnight. Afebrile.       Vital Signs Last 24 Hrs  T(C): 36.5 (13 Feb 2021 04:13), Max: 36.6 (12 Feb 2021 17:37)  T(F): 97.7 (13 Feb 2021 04:13), Max: 97.9 (12 Feb 2021 22:01)  HR: 94 (13 Feb 2021 04:13) (78 - 94)  BP: 153/70 (13 Feb 2021 04:13) (145/81 - 153/70)  BP(mean): --  RR: 19 (13 Feb 2021 04:13) (19 - 19)  SpO2: 95% (13 Feb 2021 04:13) (92% - 95%)    PHYSICAL EXAM:    GENERAL: NAD, AOX3  HEAD:  Atraumatic, Normocephalic  EYES: EOMI, PERRLA, conjunctiva and sclera clear  ENMT: Moist mucous membranes  CHEST/LUNG: Clear to auscultation bilaterally; No rales, rhonchi, wheezing, or rubs  HEART: Regular rate and rhythm; No murmurs, rubs, or gallops  ABDOMEN: Soft, Nontender, Nondistended; Bowel sounds present  EXTREMITIES: RLE with erythema over the dorsum of the foot       MEDICATIONS  (STANDING):  aspirin enteric coated 325 milliGRAM(s) Oral daily  atorvastatin 80 milliGRAM(s) Oral at bedtime  cilostazol 100 milliGRAM(s) Oral every 12 hours  dextrose 40% Gel 15 Gram(s) Oral once  dextrose 5%. 1000 milliLiter(s) (50 mL/Hr) IV Continuous <Continuous>  dextrose 5%. 1000 milliLiter(s) (100 mL/Hr) IV Continuous <Continuous>  dextrose 50% Injectable 25 Gram(s) IV Push once  dextrose 50% Injectable 12.5 Gram(s) IV Push once  dextrose 50% Injectable 25 Gram(s) IV Push once  enoxaparin Injectable 40 milliGRAM(s) SubCutaneous daily  fenofibrate Tablet 145 milliGRAM(s) Oral daily  glucagon  Injectable 1 milliGRAM(s) IntraMuscular once  insulin glargine Injectable (LANTUS) 15 Unit(s) SubCutaneous at bedtime  insulin lispro (ADMELOG) corrective regimen sliding scale   SubCutaneous three times a day before meals  insulin lispro (ADMELOG) corrective regimen sliding scale   SubCutaneous at bedtime  levothyroxine 88 MICROGram(s) Oral daily  lisinopril 40 milliGRAM(s) Oral daily  metoprolol succinate  milliGRAM(s) Oral daily  ranolazine 500 milliGRAM(s) Oral two times a day  saccharomyces boulardii 250 milliGRAM(s) Oral two times a day    MEDICATIONS  (PRN):  acetaminophen   Tablet .. 650 milliGRAM(s) Oral every 6 hours PRN Temp greater or equal to 38C (100.4F), Mild Pain (1 - 3)  oxycodone    5 mG/acetaminophen 325 mG 1 Tablet(s) Oral every 6 hours PRN Severe Pain (7 - 10)      Allergies    No Known Allergies    Intolerances          LABS:                          12.5   8.33  )-----------( 349      ( 13 Feb 2021 09:04 )             39.2     02-13    141  |  103  |  34.0<H>  ----------------------------<  165<H>  4.2   |  22.0  |  0.90    Ca    10.1      13 Feb 2021 09:04            RADIOLOGY & ADDITIONAL TESTS:

## 2021-02-13 NOTE — PROGRESS NOTE ADULT - SUBJECTIVE AND OBJECTIVE BOX
SURGERY DAILY PROGRESS NOTE:    Interval:   No podiatry intervention on this admission per Dr. Lorraine rodgers recommendations being followed  Scheduled for angiogram 2/15  No cardiac contraindications procedure    S:   Patient seen and examined. No acute events overnight. Pain is well controlled. Passing stool and flatus. Tolerating diet w/o N/V. No ambulation. Voiding spontaneously.       O:   Exam:  Gen: NAD. A&Ox3.  Well developed, alert and cooperative.   Resp: No additional work of breathing.   Card: RR. No peripheral edema or pallor.   Abd: Soft, ND, NT. No rebound or guarding.   Ext: WWP. Able to move all extremities equally.  RLE with erythema overlaying wound along dorsum of first metatarsal. Associated TTP. Strength and sensation intact b/l.   Vasc: 1+ femoral pulses b/l. Unable to obtain signals to b/l DP/PT.    Vital Signs Last 24 Hrs  T(C): 36.5 (13 Feb 2021 04:13), Max: 36.6 (12 Feb 2021 05:04)  T(F): 97.7 (13 Feb 2021 04:13), Max: 97.9 (12 Feb 2021 05:04)  HR: 94 (13 Feb 2021 04:13) (64 - 94)  BP: 153/70 (13 Feb 2021 04:13) (133/64 - 153/70)  BP(mean): --  RR: 19 (13 Feb 2021 04:13) (18 - 20)  SpO2: 95% (13 Feb 2021 04:13) (92% - 95%)      02-11-21 @ 07:01  -  02-12-21 @ 07:00  --------------------------------------------------------  IN: 1100 mL / OUT: 0 mL / NET: 1100 mL        LABS:

## 2021-02-14 NOTE — PROGRESS NOTE ADULT - SUBJECTIVE AND OBJECTIVE BOX
[facility]                                   [address] [place] [address]                                                      PHONE: [phone]                                                         FAX: [phone]  [email or web address]  ---------------------------------------------------------------------------------------------------------------------------------    Overnight events/patient complaints: patient seen at bedside. She denies chest pain or SOB.       No Known Allergies    MEDICATIONS  (STANDING):  aspirin enteric coated 325 milliGRAM(s) Oral daily  atorvastatin 80 milliGRAM(s) Oral at bedtime  cilostazol 100 milliGRAM(s) Oral every 12 hours  dextrose 40% Gel 15 Gram(s) Oral once  dextrose 5%. 1000 milliLiter(s) (50 mL/Hr) IV Continuous <Continuous>  dextrose 5%. 1000 milliLiter(s) (100 mL/Hr) IV Continuous <Continuous>  dextrose 50% Injectable 25 Gram(s) IV Push once  dextrose 50% Injectable 12.5 Gram(s) IV Push once  dextrose 50% Injectable 25 Gram(s) IV Push once  enoxaparin Injectable 40 milliGRAM(s) SubCutaneous daily  fenofibrate Tablet 145 milliGRAM(s) Oral daily  glucagon  Injectable 1 milliGRAM(s) IntraMuscular once  insulin glargine Injectable (LANTUS) 15 Unit(s) SubCutaneous at bedtime  insulin lispro (ADMELOG) corrective regimen sliding scale   SubCutaneous three times a day before meals  insulin lispro (ADMELOG) corrective regimen sliding scale   SubCutaneous at bedtime  levothyroxine 88 MICROGram(s) Oral daily  lisinopril 40 milliGRAM(s) Oral daily  metoprolol succinate  milliGRAM(s) Oral daily  ranolazine 500 milliGRAM(s) Oral two times a day  saccharomyces boulardii 250 milliGRAM(s) Oral two times a day    MEDICATIONS  (PRN):  acetaminophen   Tablet .. 650 milliGRAM(s) Oral every 6 hours PRN Temp greater or equal to 38C (100.4F), Mild Pain (1 - 3)  oxycodone    5 mG/acetaminophen 325 mG 1 Tablet(s) Oral every 6 hours PRN Severe Pain (7 - 10)      Vital Signs Last 24 Hrs  T(C): 36.4 (14 Feb 2021 05:31), Max: 36.7 (13 Feb 2021 11:53)  T(F): 97.6 (14 Feb 2021 05:31), Max: 98 (13 Feb 2021 11:53)  HR: 91 (14 Feb 2021 05:31) (79 - 91)  BP: 102/66 (14 Feb 2021 05:31) (102/66 - 167/75)  BP(mean): --  RR: 18 (14 Feb 2021 05:31) (18 - 18)  SpO2: 95% (14 Feb 2021 05:31) (92% - 95%)  ICU Vital Signs Last 24 Hrs  JAMAAL HERR  I&O's Detail    Drug Dosing Weight  JAMAAL CARMENZA      PHYSICAL EXAM:  General: Appears well developed, well nourished alert and cooperative.  HEENT: Head; normocephalic, atraumatic.  Eyes: Pupils reactive, cornea wnl.  Neck: Supple, no nodes adenopathy, no NVD or carotid bruit or thyromegaly.  CARDIOVASCULAR: Normal S1 and S2, No murmur, rub, gallop or lift.   LUNGS: No rales, rhonchi or wheeze. Normal breath sounds bilaterally.  ABDOMEN: Soft, nontender without mass or organomegaly. bowel sounds normoactive.  EXTREMITIES: No clubbing, cyanosis or edema. Distal pulses wnl.   SKIN: warm and dry with normal turgor.  NEURO: Alert/oriented x 3/normal motor exam. No pathologic reflexes.    PSYCH: normal affect.        LABS:                        12.5   8.33  )-----------( 349      ( 13 Feb 2021 09:04 )             39.2     02-13    141  |  103  |  34.0<H>  ----------------------------<  165<H>  4.2   |  22.0  |  0.90    Ca    10.1      13 Feb 2021 09:04      JAMAAL GRIGSBYREGINALD        ASSESSMENT AND PLAN:  80y Female with prior history of prior stent to RCA with occlusion to be managed medically, HTN, PAD, L subclavian stenosis, mild-moderate AS recently hospitalized for cellulitis presents with recurrent cellulitis. IONA's performed while admitted for recurrent cellulitis showed abnormal waveforms and IONA's. Patient reports about 3 weeks of symptoms, pain is localized to dorsum of her foot. Significant PVD of right common and external iliac arteries noted on CTA. Planned for possible intervention.    Pre-op cardiovascular evaluation, CAD, PAD -- she is planned for right lower extremity angiogram, stenting, and possible bypass on Monday 2/15/21. Prior Echo with preserved LV function and moderate MR, Prior cath with known RCA occlusion to be managed medically  - there are no active severe cardiac conditions at this time. Based on her medical history and functional status, she is moderate risk for perioperative cardiovascular complications for a non-low risk surgery, but is currently optimized and can proceed without further cardiac work up.   - Continue ASA with cilostazol and high dose statin therapy  - continue Toprol XL, Lisinopril, and Ranexa    Will follow with you.

## 2021-02-14 NOTE — PROGRESS NOTE ADULT - SUBJECTIVE AND OBJECTIVE BOX
Progress Note    S: Patient seen and examined. No acute events overnight. Pain is well controlled. Passing stool and flatus. Tolerating diet w/o N/V. No ambulation. Voiding spontaneously.    MEDICATIONS  (STANDING):  aspirin enteric coated 325 milliGRAM(s) Oral daily  atorvastatin 80 milliGRAM(s) Oral at bedtime  cilostazol 100 milliGRAM(s) Oral every 12 hours  dextrose 40% Gel 15 Gram(s) Oral once  dextrose 5%. 1000 milliLiter(s) (50 mL/Hr) IV Continuous <Continuous>  dextrose 5%. 1000 milliLiter(s) (100 mL/Hr) IV Continuous <Continuous>  dextrose 50% Injectable 25 Gram(s) IV Push once  dextrose 50% Injectable 12.5 Gram(s) IV Push once  dextrose 50% Injectable 25 Gram(s) IV Push once  enoxaparin Injectable 40 milliGRAM(s) SubCutaneous daily  fenofibrate Tablet 145 milliGRAM(s) Oral daily  glucagon  Injectable 1 milliGRAM(s) IntraMuscular once  insulin glargine Injectable (LANTUS) 15 Unit(s) SubCutaneous at bedtime  insulin lispro (ADMELOG) corrective regimen sliding scale   SubCutaneous three times a day before meals  insulin lispro (ADMELOG) corrective regimen sliding scale   SubCutaneous at bedtime  levothyroxine 88 MICROGram(s) Oral daily  lisinopril 40 milliGRAM(s) Oral daily  metoprolol succinate  milliGRAM(s) Oral daily  ranolazine 500 milliGRAM(s) Oral two times a day  saccharomyces boulardii 250 milliGRAM(s) Oral two times a day    MEDICATIONS  (PRN):  acetaminophen   Tablet .. 650 milliGRAM(s) Oral every 6 hours PRN Temp greater or equal to 38C (100.4F), Mild Pain (1 - 3)  oxycodone    5 mG/acetaminophen 325 mG 1 Tablet(s) Oral every 6 hours PRN Severe Pain (7 - 10)      Vital Signs Last 24 Hrs  T(C): 36.4 (14 Feb 2021 05:31), Max: 36.7 (13 Feb 2021 11:53)  T(F): 97.6 (14 Feb 2021 05:31), Max: 98 (13 Feb 2021 11:53)  HR: 91 (14 Feb 2021 05:31) (79 - 91)  BP: 102/66 (14 Feb 2021 05:31) (102/66 - 167/75)  BP(mean): --  RR: 18 (14 Feb 2021 05:31) (18 - 18)  SpO2: 95% (14 Feb 2021 05:31) (92% - 95%)    Physical Exam:   Gen: NAD. A&Ox3.  Well developed, alert and cooperative.   Resp: No additional work of breathing.   Card: RR. No peripheral edema or pallor.   Abd: Soft, ND, NT. No rebound or guarding.   Ext: WWP. Able to move all extremities equally.  RLE with erythema overlaying wound along dorsum of first metatarsal. Associated TTP. Strength and sensation intact b/l.   Vasc: 1+ femoral pulses b/l. Unable to obtain signals to b/l DP/PT.      I&O's Detail      LABS:                        12.5   8.33  )-----------( 349      ( 13 Feb 2021 09:04 )             39.2     02-13    141  |  103  |  34.0<H>  ----------------------------<  165<H>  4.2   |  22.0  |  0.90    Ca    10.1      13 Feb 2021 09:04

## 2021-02-14 NOTE — PROGRESS NOTE ADULT - ASSESSMENT
The patient is a 80 year old female with a past medical history of diabetes mellitus, PVD, COPD, hypothyroidism, CAD and hypertension recently hospitalized and treated for right foot cellulitis. MRI of the foot was negative for OM who now presented for worsening erythema and pain of the right foot. Admitted for right foot cellulitis    Assessment/plan:    1. Right foot cellulitis with severe PAD:   Blood cultures negative x 2  Wound cultures + Staph aureus  Treated with IV zosyn per ID  Per podiatry changed secondary to PVD    2. Severe PAD; On Aspirin, cilostazol and statin  Plan for angio in AM by vascular surgery- cardiology clearance noted  COVID negative; labs for AM    3. Diabetes mellitus type 2; Continue lantus  pre-meal insulin  MOnitor BSL    4. Left knee effusion: seen by ortho, to follow up as outpatient.       VTE- Lovenox subcut     Patient is of moderate risk for planned procedure; medically optimized with no absolute medical contraindications.

## 2021-02-14 NOTE — PROGRESS NOTE ADULT - SUBJECTIVE AND OBJECTIVE BOX
CC: Follow up     INTERVAL HPI/OVERNIGHT EVENTS: Patient seen and examined, no acute complaints overnight. Intermittent pain in the right foot.       Vital Signs Last 24 Hrs  T(C): 36.4 (14 Feb 2021 05:31), Max: 36.7 (13 Feb 2021 11:53)  T(F): 97.6 (14 Feb 2021 05:31), Max: 98 (13 Feb 2021 11:53)  HR: 91 (14 Feb 2021 05:31) (79 - 91)  BP: 102/66 (14 Feb 2021 05:31) (102/66 - 167/75)  BP(mean): --  RR: 18 (14 Feb 2021 05:31) (18 - 18)  SpO2: 95% (14 Feb 2021 05:31) (92% - 95%)    PHYSICAL EXAM:    GENERAL: NAD, AOX3  HEAD:  Atraumatic, Normocephalic  EYES:  conjunctiva and sclera clear  ENMT: Moist mucous membranes  NECK: Supple, No JVD  CHEST/LUNG: Clear to auscultation bilaterally; No rales, rhonchi, wheezing, or rubs  HEART: Regular rate and rhythm; No murmurs, rubs, or gallops  ABDOMEN: Soft, Nontender, Nondistended; Bowel sounds present  EXTREMITIES: Right foot dorsal erythema        MEDICATIONS  (STANDING):  aspirin enteric coated 325 milliGRAM(s) Oral daily  atorvastatin 80 milliGRAM(s) Oral at bedtime  cilostazol 100 milliGRAM(s) Oral every 12 hours  dextrose 40% Gel 15 Gram(s) Oral once  dextrose 5%. 1000 milliLiter(s) (50 mL/Hr) IV Continuous <Continuous>  dextrose 5%. 1000 milliLiter(s) (100 mL/Hr) IV Continuous <Continuous>  dextrose 50% Injectable 25 Gram(s) IV Push once  dextrose 50% Injectable 12.5 Gram(s) IV Push once  dextrose 50% Injectable 25 Gram(s) IV Push once  enoxaparin Injectable 40 milliGRAM(s) SubCutaneous daily  fenofibrate Tablet 145 milliGRAM(s) Oral daily  glucagon  Injectable 1 milliGRAM(s) IntraMuscular once  insulin glargine Injectable (LANTUS) 15 Unit(s) SubCutaneous at bedtime  insulin lispro (ADMELOG) corrective regimen sliding scale   SubCutaneous three times a day before meals  insulin lispro (ADMELOG) corrective regimen sliding scale   SubCutaneous at bedtime  levothyroxine 88 MICROGram(s) Oral daily  lisinopril 40 milliGRAM(s) Oral daily  metoprolol succinate  milliGRAM(s) Oral daily  ranolazine 500 milliGRAM(s) Oral two times a day  saccharomyces boulardii 250 milliGRAM(s) Oral two times a day    MEDICATIONS  (PRN):  acetaminophen   Tablet .. 650 milliGRAM(s) Oral every 6 hours PRN Temp greater or equal to 38C (100.4F), Mild Pain (1 - 3)  oxycodone    5 mG/acetaminophen 325 mG 1 Tablet(s) Oral every 6 hours PRN Severe Pain (7 - 10)      Allergies    No Known Allergies    Intolerances          LABS:                          12.5   8.33  )-----------( 349      ( 13 Feb 2021 09:04 )             39.2     02-13    141  |  103  |  34.0<H>  ----------------------------<  165<H>  4.2   |  22.0  |  0.90    Ca    10.1      13 Feb 2021 09:04            RADIOLOGY & ADDITIONAL TESTS:

## 2021-02-14 NOTE — PROGRESS NOTE ADULT - ASSESSMENT
Problem: Falls - Risk of:  Goal: Will remain free from falls  Description  Will remain free from falls  Outcome: Ongoing  Note:   Bed in lowest position. Wheels locked. Call light in reach. 2/4 siderails up. Bed alarm on. Problem: Safety:  Goal: Free from accidental physical injury  Description  Free from accidental physical injury  Outcome: Ongoing  Note:   Wheels locked, call light within reach, siderails 2/4 up, and bed in lowest position. Patient will remain free of injury. Problem: Activity:  Goal: Energy level will increase  Description  Energy level will increase  Outcome: Ongoing  Note:   Patient does get up and ambulate to and from the bed to the bathroom as needed with a standby assist.      Problem: Respiratory:  Goal: Respiratory status will improve  Description  Respiratory status will improve  Outcome: Ongoing  Note:   Patient's lung sounds are clear and diminished. Will continue to monitor. A/P: 80 year old female with peripheral vascular disease s/p remote bypass in 2003 of the femoral artery, admitted for cellulitis of RLE, with Surgery following for concern of rest pain. Significant PVD of right common and external iliac arteries noted on CTA. No additional changes since initial evaluation.     -Plan for angiogram 2/17  - obtain repeat COVID, labs (T&S, coags, bmp, cbc, mag, phos)  - no cardiac contraindications for procedure  - c/w abx per ID  -Remainder of management per primary team

## 2021-02-14 NOTE — DIETITIAN INITIAL EVALUATION ADULT. - OTHER INFO
The patient is a 80 year old female with a past medical history of diabetes mellitus, PVD, COPD, hypothyroidism, CAD and hypertension recently hospitalized and treated for right foot cellulitis. MRI of the foot was negative for OM who now presented for worsening erythema and pain of the right foot. Admitted for right foot cellulitis. Pt continues with good po intake, consuming 100% of meals. Pt stated multiple family members assisting her with meals at home. Pt emotional during assessment, limited education provided. Food preferences obtained. Last documented BM 2/11.

## 2021-02-15 NOTE — PROGRESS NOTE ADULT - SUBJECTIVE AND OBJECTIVE BOX
INTERVAL HPI/OVERNIGHT EVENTS:    Patient seen in the post-operative setting. Patient is s/p right fem endarterectomy with patch repair and right ax-fem, fem-pop bypass with Dr. Wells and Dr. Gonsalez. Surgery was completed with EBL 1500, IVF 2500, 2uRBC, and 1500 UOP from huynh catheter. 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 Post-operatively she had signals to RLE PT which were lost in PACU. Vascular surgery attending at bedside at this time and aware. Capillary refill remains promising with adequate inflow to popliteal artery. Patient managed by SICU at this time and required TLC placement to right IJ for continued pressor requirements. Post-operative labs and imaging reviewed. Remains sedated with precedex and on Fentanyl gtt for pain control. mechanical ventilation at this time. Troponins remain negative.       MEDICATIONS  (STANDING):  acetaminophen    Suspension .. 650 milliGRAM(s) Oral every 6 hours  ceFAZolin   IVPB 2000 milliGRAM(s) IV Intermittent every 8 hours  chlorhexidine 0.12% Liquid 15 milliLiter(s) Oral Mucosa every 12 hours  chlorhexidine 2% Cloths 1 Application(s) Topical <User Schedule>  dexMEDEtomidine Infusion 1 MICROgram(s)/kG/Hr (22.1 mL/Hr) IV Continuous <Continuous>  dextrose 40% Gel 15 Gram(s) Oral once  dextrose 5%. 1000 milliLiter(s) (50 mL/Hr) IV Continuous <Continuous>  dextrose 5%. 1000 milliLiter(s) (100 mL/Hr) IV Continuous <Continuous>  dextrose 50% Injectable 25 Gram(s) IV Push once  dextrose 50% Injectable 12.5 Gram(s) IV Push once  dextrose 50% Injectable 25 Gram(s) IV Push once  fentaNYL   Infusion. 0.5 MICROgram(s)/kG/Hr (4.43 mL/Hr) IV Continuous <Continuous>  glucagon  Injectable 1 milliGRAM(s) IntraMuscular once  insulin glargine Injectable (LANTUS) 5 Unit(s) SubCutaneous at bedtime  insulin lispro (ADMELOG) corrective regimen sliding scale   SubCutaneous three times a day before meals  lactated ringers. 1000 milliLiter(s) (100 mL/Hr) IV Continuous <Continuous>  levothyroxine Injectable 44 MICROGram(s) IV Push at bedtime  norepinephrine Infusion 0.05 MICROgram(s)/kG/Min (8.3 mL/Hr) IV Continuous <Continuous>  pantoprazole  Injectable 40 milliGRAM(s) IV Push daily  phenylephrine    Infusion 1 MICROgram(s)/kG/Min (33.2 mL/Hr) IV Continuous <Continuous>    MEDICATIONS  (PRN):  ondansetron Injectable 4 milliGRAM(s) IV Push every 6 hours PRN Nausea      Vital Signs Last 24 Hrs  T(C): 35.3 (15 Feb 2021 22:00), Max: 36.4 (15 Feb 2021 05:53)  T(F): 95.5 (15 Feb 2021 22:00), Max: 97.6 (15 Feb 2021 05:53)  HR: 94 (15 Feb 2021 22:00) (86 - 100)  BP: 117/68 (15 Feb 2021 22:00) (64/27 - 154/58)  BP(mean): 80 (15 Feb 2021 22:00) (34 - 100)  RR: 21 (15 Feb 2021 22:00) (10 - 23)  SpO2: 100% (15 Feb 2021 22:00) (94% - 100%)    Physical Exam:  GEN: NAD, laying in bed  HEENT: ETT in place on mechanical vent  Chest: non-tender, right sided chest wall surgical access with dressing in place, no strikethrough at this time  CV:  non-tachycardic, no palpable radial pulse b/l   GI: soft obese abdomen non tender  MSK: moving all extremities and follows commands with minimal sedation for RASS -1. RLE dorsum with wound and eschar with tissue loss  Neuro: RASS -1  Vasc: 1+ b/l femoral pulses, Signals to right popliteal artery, 2s capillary refill with bluish discoloration of dorsum of right foot. Signals to LLE DP    I&O's Detail      LABS:                        10.6   30.38 )-----------( 261      ( 15 Feb 2021 17:08 )             32.5     02-15    138  |  105  |  21.0<H>  ----------------------------<  362<H>  5.6<H>   |  21.0<L>  |  0.71    Ca    8.0<L>      15 Feb 2021 17:08  Phos  3.9     02-15  Mg     1.4     02-15      PT/INR - ( 15 Feb 2021 17:08 )   PT: 15.0 sec;   INR: 1.31 ratio         PTT - ( 15 Feb 2021 17:08 )  PTT:25.7 sec      RADIOLOGY & ADDITIONAL STUDIES:

## 2021-02-15 NOTE — PROGRESS NOTE ADULT - ASSESSMENT
80 year old female with peripheral vascular disease s/p right femoral artery patch repair without bypass, admitted for cellulitis of RLE, with Surgery following for concern of rest pain. Significant PVD of right common and external iliac arteries noted on CTA. No additional changes since initial evaluation.     - s/p Right femoral endarterectomy with patch repair, Right ax-fem, fem-pop bypass and completion angio showing patent distal PT flow.   - Continued resuscitation by SICU  - neurovascular checks per SICU team  - Continue Ancef  - If bleeding risk minimal, OK to start low rate heparin drip @ 400cc/hr  - Elevate RLE  - Vascular surgery will continue to follow patient  - Prevena in place to right groin

## 2021-02-15 NOTE — PROGRESS NOTE ADULT - SUBJECTIVE AND OBJECTIVE BOX
CC: Follow up    INTERVAL HPI/OVERNIGHT EVENTS: Patient seen and examined, post op in the PACU. On IV pressors       Vital Signs Last 24 Hrs  T(C): 36.4 (15 Feb 2021 05:53), Max: 36.5 (14 Feb 2021 17:05)  T(F): 97.6 (15 Feb 2021 05:53), Max: 97.7 (14 Feb 2021 17:05)  HR: 89 (15 Feb 2021 05:53) (75 - 89)  BP: 90/63 (15 Feb 2021 05:53) (90/63 - 123/63)  BP(mean): --  RR: 20 (15 Feb 2021 05:53) (18 - 20)  SpO2: 94% (15 Feb 2021 05:53) (90% - 94%)    PHYSICAL EXAM:    GENERAL: NAD  HEAD:  Atraumatic, Normocephalic  EYES: conjunctiva and sclera clear  CHEST/LUNG: Clear to auscultation bilaterally; No rales, rhonchi, wheezing, or rubs  HEART: Regular rate and rhythm; No murmurs, rubs, or gallops  ABDOMEN: Soft, Nontender, Nondistended; Bowel sounds present  EXTREMITIES:    RLE with dorsal erythema and wound noted         MEDICATIONS  (STANDING):  ceFAZolin   IVPB 2000 milliGRAM(s) IV Intermittent every 8 hours  dextrose 40% Gel 15 Gram(s) Oral once  dextrose 5%. 1000 milliLiter(s) (50 mL/Hr) IV Continuous <Continuous>  dextrose 5%. 1000 milliLiter(s) (100 mL/Hr) IV Continuous <Continuous>  dextrose 50% Injectable 25 Gram(s) IV Push once  dextrose 50% Injectable 12.5 Gram(s) IV Push once  dextrose 50% Injectable 25 Gram(s) IV Push once  glucagon  Injectable 1 milliGRAM(s) IntraMuscular once  insulin glargine Injectable (LANTUS) 5 Unit(s) SubCutaneous at bedtime  insulin lispro (ADMELOG) corrective regimen sliding scale   SubCutaneous three times a day before meals  levothyroxine Injectable 44 MICROGram(s) IV Push at bedtime    MEDICATIONS  (PRN):      Allergies    No Known Allergies    Intolerances          LABS:                          11.8   6.96  )-----------( 333      ( 15 Feb 2021 06:49 )             37.0     02-15    140  |  103  |  31.0<H>  ----------------------------<  143<H>  4.3   |  21.0<L>  |  0.95    Ca    9.9      15 Feb 2021 06:49      PT/INR - ( 15 Feb 2021 06:49 )   PT: 13.2 sec;   INR: 1.15 ratio               RADIOLOGY & ADDITIONAL TESTS:

## 2021-02-15 NOTE — PROGRESS NOTE ADULT - ASSESSMENT
80 year old female with peripheral vascular disease s/p bypass in 2003 of the femoral artery, admitted for cellulitis of RLE, with Surgery following for concern of rest pain. Significant PVD of right common and external iliac arteries noted on CTA. No additional changes since initial evaluation.     - Plan for angiogram 2/15  - covid neg 2/13  - no cardiac contraindications for procedure; cards recs appreciated  - abx course completed, no leukocytosis   -Remainder of management per primary team

## 2021-02-15 NOTE — PROGRESS NOTE ADULT - SUBJECTIVE AND OBJECTIVE BOX
Start Medrol Dosepak tomorrow morning and take per directions. Cyclobenzaprine this is muscle relaxant 10 mg take 1 tablet at bedtime if that makes her sleepy take half of the tablet. Atorvastatin 10 mg 1 tablet daily to lower your cholesterol.   Low-fat INTERVAL HPI/OVERNIGHT EVENTS:  NAOE, pt NPO for angiogram today. Denies n/v, chest pain, SOB.    MEDICATIONS  (STANDING):  aspirin enteric coated 325 milliGRAM(s) Oral daily  atorvastatin 80 milliGRAM(s) Oral at bedtime  cilostazol 100 milliGRAM(s) Oral every 12 hours  dextrose 40% Gel 15 Gram(s) Oral once  dextrose 5%. 1000 milliLiter(s) (50 mL/Hr) IV Continuous <Continuous>  dextrose 5%. 1000 milliLiter(s) (100 mL/Hr) IV Continuous <Continuous>  dextrose 50% Injectable 12.5 Gram(s) IV Push once  dextrose 50% Injectable 25 Gram(s) IV Push once  dextrose 50% Injectable 25 Gram(s) IV Push once  enoxaparin Injectable 40 milliGRAM(s) SubCutaneous daily  fenofibrate Tablet 145 milliGRAM(s) Oral daily  glucagon  Injectable 1 milliGRAM(s) IntraMuscular once  insulin glargine Injectable (LANTUS) 15 Unit(s) SubCutaneous at bedtime  insulin lispro (ADMELOG) corrective regimen sliding scale   SubCutaneous three times a day before meals  insulin lispro (ADMELOG) corrective regimen sliding scale   SubCutaneous at bedtime  lactated ringers. 1000 milliLiter(s) (100 mL/Hr) IV Continuous <Continuous>  levothyroxine 88 MICROGram(s) Oral daily  lisinopril 40 milliGRAM(s) Oral daily  metoprolol succinate  milliGRAM(s) Oral daily  ranolazine 500 milliGRAM(s) Oral two times a day    MEDICATIONS  (PRN):  acetaminophen   Tablet .. 650 milliGRAM(s) Oral every 6 hours PRN Temp greater or equal to 38C (100.4F), Mild Pain (1 - 3)  benzonatate 100 milliGRAM(s) Oral every 8 hours PRN Cough  oxycodone    5 mG/acetaminophen 325 mG 1 Tablet(s) Oral every 6 hours PRN Severe Pain (7 - 10)      Vital Signs Last 24 Hrs  T(C): 36.5 (14 Feb 2021 17:05), Max: 36.6 (14 Feb 2021 12:06)  T(F): 97.7 (14 Feb 2021 17:05), Max: 97.9 (14 Feb 2021 12:06)  HR: 75 (14 Feb 2021 17:05) (72 - 91)  BP: 123/63 (14 Feb 2021 17:05) (102/66 - 128/54)  BP(mean): --  RR: 18 (14 Feb 2021 17:05) (18 - 18)  SpO2: 90% (14 Feb 2021 17:05) (90% - 95%)    Gen: NAD   Resp: breathing comfortably on RA  Card: RRR  Abd: Soft, ND, NT. No rebound or guarding.   Ext: WWP. Able to move all extremities equally.  RLE with erythema overlaying dorsum of first metatarsal. Associated TTP. Strength and sensation intact b/l.   Vasc: 1+ femoral pulses b/l. Unable to obtain signals to b/l DP/PT.    I&O's Detail      LABS:                        12.5   8.33  )-----------( 349      ( 13 Feb 2021 09:04 )             39.2     02-13    141  |  103  |  34.0<H>  ----------------------------<  165<H>  4.2   |  22.0  |  0.90    Ca    10.1      13 Feb 2021 09:04

## 2021-02-15 NOTE — PROGRESS NOTE ADULT - SUBJECTIVE AND OBJECTIVE BOX
Pt here for RLE revascularization. Risks and benefits of the procedure explained to the patient including but not limited to bleeding, infection, thrombosis, embolization, limb loss, mi, death. Risks of no surgical intervention explained to the patient including but not limited to progressive tissue loss, limb loss. Pt understood all that was explained and is agreeable to proceed.

## 2021-02-15 NOTE — PROGRESS NOTE ADULT - ASSESSMENT
The patient is a 80 year old female with a past medical history of diabetes mellitus, PVD, COPD, hypothyroidism, CAD and hypertension recently hospitalized and treated for right foot cellulitis. MRI of the foot was negative for OM who now presented for worsening erythema and pain of the right foot. Admitted for right foot cellulitis. Started on IV antibiotics, suspected secondary to PVD not active infection. Vascular surgery was consulted, status post axillo-femoral and fem-pop bypass with right lower extremity angiography; endarterectomy of the RSFA and common femoral.     Assessment/plan:    1. Right foot cellulitis with severe PAD:   Blood cultures negative x 2  Wound cultures + Staph aureus  Treated with IV zosyn per ID  Per podiatry changed secondary to PVD    2. Severe PAD; To continue Aspirin, cilostazol and statin  Status post intervention. Hypotensive on pressor support post procedure      3. Diabetes mellitus type 2; Continue lantus  pre-meal insulin  MOnitor BSL    4. Left knee effusion: seen by ortho, to follow up as outpatient.     5. History of CAD   Echo 3/2020: LVEF 55%, moderate MR, mild AS, mild TR  CARDIAC CATHETERIZATION: 6/2018: Normal LAD, LACX with 100% stenosis of the distal RCA at site of prior stent. Medical therapy was recommended.    Patient to be transferred to the SICU post procedure. Will sign off. Please re-call hospital service once patient medically stable to be transferred out ot the SICU

## 2021-02-16 NOTE — PROGRESS NOTE ADULT - ATTENDING COMMENTS
Pt seen and examined. Agree with above. Likely occlusion of fem-pop bypass. Ax-fem patent.   Foot perfused but toes ischemic.    Pt is extubated and weaned off pressors.    Given HD instability, post surgery and pt's medical co-morbidities It would risk high mortality to reoperate on this patient at this point to attemt to further improve the circulation.   Patient may require eventual TMA and possibly BKA   Care as per SICU  Will follow along

## 2021-02-16 NOTE — CONSULT NOTE ADULT - ASSESSMENT
80 year old female with peripheral vascular disease s/p bypass in 2003 of the femoral artery, admitted for cellulitis of RLE, with Surgery following for concern of rest pain. Significant PVD of right common and external iliac arteries noted on CTA. now s/p R ax fem/fem pop bypass.    Neuro: intubated. sedated on propofol   Pulm: AC 10/450/8/50. SBT in am with possible extubation  CV: hypotensive - weaned off phenylephrine. remains on levo - weaning per NIBP.   GI: NPO  : huynh in place for strict I/Os  Endo: SSI, resumed home synthroid  Heme/dvt: hep gtt at 400. 2U PRBC given intra op +1 U PRBC post op  ID: Ancef  Lines: RIJ TLC  Skin: R fem prevena. R Calf surgical dressing.

## 2021-02-16 NOTE — CONSULT NOTE ADULT - SUBJECTIVE AND OBJECTIVE BOX
SICU CONSULT NOTE    HPI:   80 year old female with peripheral vascular disease s/p bypass in 2003 of the femoral artery, admitted for cellulitis of RLE, with Surgery following for concern of rest pain. Significant PVD of right common and external iliac arteries noted on CTA. patient now s/p R ax-fem/fem-pop bypass. EBL 1500, IVF 2500, 2U PRBCs intra op. Pre operatively, patient did not have dopplerable signals. post operatively no dopplerable signals in DP or PT b/l. Patient admitted to SICU on pressors. Patient recieved 1L IVF bolus post operatively. Pressors being titrated based on NIBP as L radial a line inaccurate and No other possible access points.     PMH:  PVD (peripheral vascular disease)    DM (diabetes mellitus)    Type 2 diabetes mellitus with other specified complication, unspecified whether long term insulin use    Hyperlipidemia, unspecified hyperlipidemia type    Coronary artery disease, angina presence unspecified, unspecified vessel or lesion type, unspecified whether native or transplanted heart    Pulmonary emphysema, unspecified emphysema type      PSH:  R fem aneurysm patch repair  L carotid endarterectomy    Home Medications:  aspirin 325 mg oral delayed release tablet: 1 tab(s) orally once a day  atorvastatin 80 mg oral tablet: 1 tab(s) orally once a day (at bedtime)  cilostazol 100 mg oral tablet: 1 tab(s) orally every 12 hours  fenofibrate 145 mg oral tablet: 1 tab(s) orally once a day  levothyroxine 88 mcg (0.088 mg) oral tablet: 1 tab(s) orally once a day  lisinopril 40 mg oral tablet: 1 tab(s) orally once a day  Metoprolol Succinate  mg oral tablet, extended release: 1 tab(s) orally once a day  ranolazine 500 mg oral tablet, extended release: 1 tab(s) orally 2 times a day  Tresiba 100 units/mL subcutaneous solution: 22 unit(s) subcutaneous once a day (at bedtime)  Trulicity Pen 1.5 mg/0.5 mL subcutaneous solution: 1 application subcutaneous once a week    ALL: knda      FMH:  Denies family history of gastrointestinal malignancy       SOC Hx:   Denies etoh, tobacco, or drug use     Physical Exam:   Gen: intubated.  Neuro:  sedated on propofol. arousable. moving all extremities  RESP: CTABL, intubated. saturating well on AC 10/450/8/50  CVS: RRR,   GI: abd soft, obese, ND, no rebound/guarding  Extremities: 2+ peripheral pulses

## 2021-02-16 NOTE — PROGRESS NOTE ADULT - SUBJECTIVE AND OBJECTIVE BOX
INTERVAL HPI/OVERNIGHT EVENTS:    Patient seen this AM with continued resuscitation needs under SICU care. Patient now on fent/propofol on levo at this time via TLC. Consent obtained from daughter to transfuse in this post-operative period.     MEDICATIONS  (STANDING):  acetaminophen    Suspension .. 650 milliGRAM(s) Oral every 6 hours  ceFAZolin   IVPB 2000 milliGRAM(s) IV Intermittent every 8 hours  chlorhexidine 0.12% Liquid 15 milliLiter(s) Oral Mucosa every 12 hours  chlorhexidine 2% Cloths 1 Application(s) Topical <User Schedule>  dextrose 40% Gel 15 Gram(s) Oral once  dextrose 5%. 1000 milliLiter(s) (50 mL/Hr) IV Continuous <Continuous>  dextrose 5%. 1000 milliLiter(s) (100 mL/Hr) IV Continuous <Continuous>  dextrose 50% Injectable 25 Gram(s) IV Push once  dextrose 50% Injectable 12.5 Gram(s) IV Push once  dextrose 50% Injectable 25 Gram(s) IV Push once  fentaNYL   Infusion. 0.5 MICROgram(s)/kG/Hr (4.43 mL/Hr) IV Continuous <Continuous>  glucagon  Injectable 1 milliGRAM(s) IntraMuscular once  heparin  Infusion 400 Unit(s)/Hr (4 mL/Hr) IV Continuous <Continuous>  insulin glargine Injectable (LANTUS) 5 Unit(s) SubCutaneous at bedtime  insulin lispro (ADMELOG) corrective regimen sliding scale   SubCutaneous every 6 hours  levothyroxine Injectable 44 MICROGram(s) IV Push at bedtime  multiple electrolytes Injection Type 1 1000 milliLiter(s) (100 mL/Hr) IV Continuous <Continuous>  norepinephrine Infusion 0.05 MICROgram(s)/kG/Min (8.3 mL/Hr) IV Continuous <Continuous>  pantoprazole  Injectable 40 milliGRAM(s) IV Push daily  phenylephrine    Infusion 1 MICROgram(s)/kG/Min (33.2 mL/Hr) IV Continuous <Continuous>  propofol Infusion 10 MICROgram(s)/kG/Min (5.79 mL/Hr) IV Continuous <Continuous>    MEDICATIONS  (PRN):  ondansetron Injectable 4 milliGRAM(s) IV Push every 6 hours PRN Nausea      Vital Signs Last 24 Hrs  T(C): 37.2 (16 Feb 2021 00:00), Max: 37.2 (16 Feb 2021 00:00)  T(F): 99 (16 Feb 2021 00:00), Max: 99 (16 Feb 2021 00:00)  HR: 78 (16 Feb 2021 02:20) (70 - 100)  BP: 140/57 (16 Feb 2021 02:20) (64/27 - 154/58)  BP(mean): 80 (16 Feb 2021 02:20) (34 - 100)  RR: 15 (16 Feb 2021 02:20) (10 - 23)  SpO2: 100% (16 Feb 2021 02:20) (83% - 100%)    Physical Exam:  GEN: NAD, laying in bed, appears stated age  CV:  non-tachycardic  Pulm: no increased work of breathing sedated on mechanical vent  GI: soft, obese, non-tender  Vasc: unable to palpate distal pulses. No signals of right lower extremity obtained at this time, RLE cool to touch below level of knee. Delayed capillary refill   Skin: surgical sites with dressing  in place without expanding hematoma, no strikethrough of dressing, R groin prevena in place    I&O's Detail    15 Feb 2021 07:01  -  16 Feb 2021 02:32  --------------------------------------------------------  IN:    Dexmedetomidine: 30 mL    FentaNYL: 26.7 mL    Lactated Ringers: 400 mL    multiple electrolytes Injection Type 1 Bolus: 500 mL    multiple electrolytes Injection Type 1.: 200 mL    Norepinephrine: 87.5 mL    Phenylephrine: 51 mL    Propofol: 16 mL  Total IN: 1311.2 mL    OUT:    Indwelling Catheter - Urethral (mL): 755 mL  Total OUT: 755 mL    Total NET: 556.2 mL          LABS:                        9.7    21.43 )-----------( 232      ( 15 Feb 2021 23:18 )             29.4     02-15    140  |  107  |  23.0<H>  ----------------------------<  322<H>  5.2   |  20.0<L>  |  0.96    Ca    8.2<L>      15 Feb 2021 23:18  Phos  4.1     02-15  Mg     1.4     02-15      PT/INR - ( 15 Feb 2021 23:18 )   PT: 16.8 sec;   INR: 1.48 ratio         PTT - ( 15 Feb 2021 23:18 )  PTT:24.9 sec      RADIOLOGY & ADDITIONAL STUDIES:

## 2021-02-16 NOTE — CONSULT NOTE ADULT - ATTENDING COMMENTS
Patient was examined.  All documentation reviewed.  Discussed pathology and treatment plan with resident.  Reviewed written documentation and agreed with the above.  Patient will be follow while in house.
Patient was seen at bedside on 2/16/2021.  GCS11T.  Stable on Vent. Wean to extubate.  Hemodynamically stable off pressors.   Good UO. Decrease IV fluids.  On 400 units /hr Heparin GTT per primary team. After discussion with Primary team will advance to full therapeutic anticoagulation.  Hyperglycemic started on insuline drip.

## 2021-02-16 NOTE — PROGRESS NOTE ADULT - SUBJECTIVE AND OBJECTIVE BOX
[facility], THE [facility]ART [facility]ENTER                                   [address] [place] [address]                                                      PHONE: [phone]                                                         FAX: [phone]  [email or web address]  ---------------------------------------------------------------------------------------------------------------------------------    Overnight events/patient complaints: s/p right SFA and femoral endarterectomy with patch repair, Right ax-fem, fem-pop bypass, for critical limb ischemia with post operative blood loss and hypotension now extubated and off pressors. Absent DP Doppler signal and on heparin gtt     INTERPRETATION OF TELEMETRY (personally reviewed)    ECG:     ECHO:    STRESS TEST:    CARDIAC CATHETERIZATION:    I&O's Summary    15 Feb 2021 07:01  -  16 Feb 2021 07:00  --------------------------------------------------------  IN: 2944.6 mL / OUT: 1023 mL / NET: 1921.6 mL    16 Feb 2021 07:01  -  16 Feb 2021 17:17  --------------------------------------------------------  IN: 514 mL / OUT: 575 mL / NET: -61 mL        PAST MEDICAL & SURGICAL HISTORY:  PVD (peripheral vascular disease)    DM (diabetes mellitus)    Type 2 diabetes mellitus with other specified complication, unspecified whether long term insulin use    Hyperlipidemia, unspecified hyperlipidemia type    Coronary artery disease, angina presence unspecified, unspecified vessel or lesion type, unspecified whether native or transplanted heart    Pulmonary emphysema, unspecified emphysema type    No significant past surgical history        No Known Allergies    MEDICATIONS  (STANDING):  aspirin 325 milliGRAM(s) Oral daily  atorvastatin 80 milliGRAM(s) Oral at bedtime  ceFAZolin   IVPB 2000 milliGRAM(s) IV Intermittent every 8 hours  chlorhexidine 2% Cloths 1 Application(s) Topical <User Schedule>  dextrose 40% Gel 15 Gram(s) Oral once  dextrose 5%. 1000 milliLiter(s) (50 mL/Hr) IV Continuous <Continuous>  dextrose 5%. 1000 milliLiter(s) (100 mL/Hr) IV Continuous <Continuous>  dextrose 50% Injectable 25 Gram(s) IV Push once  dextrose 50% Injectable 12.5 Gram(s) IV Push once  dextrose 50% Injectable 25 Gram(s) IV Push once  fenofibrate Tablet 145 milliGRAM(s) Oral daily  glucagon  Injectable 1 milliGRAM(s) IntraMuscular once  heparin  Infusion 800 Unit(s)/Hr (8 mL/Hr) IV Continuous <Continuous>  insulin regular Infusion 4 Unit(s)/Hr (4 mL/Hr) IV Continuous <Continuous>  levothyroxine 88 MICROGram(s) Oral daily  multiple electrolytes Injection Type 1 1000 milliLiter(s) (100 mL/Hr) IV Continuous <Continuous>  senna 2 Tablet(s) Oral at bedtime    MEDICATIONS  (PRN):  ondansetron Injectable 4 milliGRAM(s) IV Push every 6 hours PRN Nausea  oxyCODONE    IR 5 milliGRAM(s) Oral every 4 hours PRN Moderate to severe pain (4-10)      Vital Signs Last 24 Hrs  T(C): 37 (16 Feb 2021 15:28), Max: 37.2 (16 Feb 2021 00:00)  T(F): 98.6 (16 Feb 2021 15:28), Max: 99 (16 Feb 2021 00:00)  HR: 88 (16 Feb 2021 16:00) (70 - 100)  BP: 142/58 (16 Feb 2021 16:00) (64/27 - 149/68)  BP(mean): 82 (16 Feb 2021 16:00) (34 - 93)  RR: 23 (16 Feb 2021 16:00) (10 - 25)  SpO2: 95% (16 Feb 2021 16:00) (83% - 100%)  ICU Vital Signs Last 24 Hrs    PHYSICAL EXAM:  General: Appears well developed, well nourished alert and cooperative.  HEENT: Head; normocephalic, atraumatic.Pupils reactive, cornea wnl. Neck supple, no nodes adenopathy, no JVD  CARDIOVASCULAR: Normal S1 and S2, 1/6 LION, no rub, gallop or lift.   LUNGS: No rales, rhonchi or wheeze. Normal breath sounds bilaterally.  ABDOMEN: Soft, nontender without mass or organomegaly. bowel sounds normoactive.  EXTREMITIES: No clubbing, cyanosis or edema.   SKIN: warm and dry with normal turgor.  NEURO: Alert/oriented x 3/normal motor exam.   PSYCH: normal affect.        LABS:                        10.3   15.69 )-----------( 197      ( 16 Feb 2021 03:22 )             32.5     02-16    141  |  107  |  23.0<H>  ----------------------------<  270<H>  5.1   |  23.0  |  0.91    Ca    7.9<L>      16 Feb 2021 03:22  Phos  3.3     02-16  Mg     1.4     02-16      JAMAAL HERR  CARDIAC MARKERS ( 15 Feb 2021 23:18 )  x     / <0.01 ng/mL / x     / x     / x      CARDIAC MARKERS ( 15 Feb 2021 17:08 )  x     / <0.01 ng/mL / x     / x     / x          PT/INR - ( 15 Feb 2021 23:18 )   PT: 16.8 sec;   INR: 1.48 ratio    PTT - ( 16 Feb 2021 08:18 )  PTT:27.9 sec      ASSESSMENT AND PLAN:  In summary, JAMAAL HERR is a 80y Female with prior history of prior stent to RCA with occlusion to be managed medically, HTN, PAD, L subclavian stenosis, mild-moderate AS recently hospitalized for cellulitis presents with recurrent cellulitis found to have critical limb ischemia now s/p axillo-bifemoral and femoropopliteal bypass and right SFA and femoral endarterectomy with patch repair.  Currently hemodynamically stable, off pressors and extubated     Critical limb ischemia/severe PAD/CAD  - s/p urgent intervention; continue heparin gtt, antiplatelet and statin therapy  - close monitoring of pulses, and continued monitoring to progressive limb ischemic changes   - Prior cath with known RCA occlusion to be medically managed  - continue Toprol XL, Lisinopril as hemodynamics have improved as well as Ranexa  - O2 support and wean as tolerated     Thank you for allowing [facility] to participate in the care of this patient.  Please feel free to call with any questions or concerns.

## 2021-02-17 NOTE — PROGRESS NOTE ADULT - ATTENDING COMMENTS
Pt seen and examined. No acute events. Pt has motor and sensory deficit of her right foot. No doppler signals in the foot.  Strong doppler over R Ax-fem, doppler pop.     Given CLI and high risk of amputation without further intervention and the morbidity associated with major limb amputation in this morbidly obese female, attempted thrombectomy/embolectomy is planned for Friday.  Risks and benefits of the procedure explained to the patient and family including but not limited to bleeding, infection, limb loss, MI and death. Patient understood all that was explained and is agreeable to proceed.   Scheduled for Friday  Medical and cardiac clearance  Discussed with SICU team and hospitalist

## 2021-02-17 NOTE — PROGRESS NOTE ADULT - SUBJECTIVE AND OBJECTIVE BOX
[facility], THE [facility]ART [facility]ENTER                                   [address] [place] [address]                                                      PHONE: [phone]                                                         FAX: [phone]  [email or web address]  ---------------------------------------------------------------------------------------------------------------------------------    Overnight events/patient complaints: continues to have non-dopplerable pulses in the left lower extremity and reports increased pain     INTERPRETATION OF TELEMETRY (personally reviewed)    I&O's Summary    16 Feb 2021 07:01  -  17 Feb 2021 07:00  --------------------------------------------------------  IN: 1515 mL / OUT: 1400 mL / NET: 115 mL    17 Feb 2021 07:01  -  17 Feb 2021 13:50  --------------------------------------------------------  IN: 613.5 mL / OUT: 575 mL / NET: 38.5 mL        PAST MEDICAL & SURGICAL HISTORY:  PVD (peripheral vascular disease)  DM (diabetes mellitus)  Type 2 diabetes mellitus with other specified complication, unspecified whether long term insulin use  Hyperlipidemia, unspecified hyperlipidemia type  Coronary artery disease, angina presence unspecified, unspecified vessel or lesion type, unspecified whether native or transplanted heart  Pulmonary emphysema, unspecified emphysema type    No significant past surgical history    No Known Allergies    MEDICATIONS  (STANDING):  aspirin 325 milliGRAM(s) Oral daily  atorvastatin 80 milliGRAM(s) Oral at bedtime  ceFAZolin   IVPB 2000 milliGRAM(s) IV Intermittent every 8 hours  chlorhexidine 2% Cloths 1 Application(s) Topical <User Schedule>  cilostazol 100 milliGRAM(s) Oral every 12 hours  fenofibrate Tablet 145 milliGRAM(s) Oral daily  glucagon  Injectable 1 milliGRAM(s) IntraMuscular once  heparin  Infusion 800 Unit(s)/Hr (12 mL/Hr) IV Continuous <Continuous>  insulin glargine Injectable (LANTUS) 20 Unit(s) SubCutaneous at bedtime  insulin lispro (ADMELOG) corrective regimen sliding scale   SubCutaneous three times a day before meals  insulin lispro Injectable (ADMELOG) 6 Unit(s) SubCutaneous before breakfast  insulin lispro Injectable (ADMELOG) 6 Unit(s) SubCutaneous before lunch  insulin lispro Injectable (ADMELOG) 6 Unit(s) SubCutaneous before dinner  levothyroxine 88 MICROGram(s) Oral daily  metoprolol tartrate 50 milliGRAM(s) Oral every 12 hours  polyethylene glycol 3350 17 Gram(s) Oral daily  ranolazine 500 milliGRAM(s) Oral two times a day  senna 2 Tablet(s) Oral at bedtime  tamsulosin 0.4 milliGRAM(s) Oral at bedtime    MEDICATIONS  (PRN):  ondansetron Injectable 4 milliGRAM(s) IV Push every 6 hours PRN Nausea  oxycodone    5 mG/acetaminophen 325 mG 1 Tablet(s) Oral every 4 hours PRN Moderate Pain (4 - 6)  oxycodone    5 mG/acetaminophen 325 mG 2 Tablet(s) Oral every 4 hours PRN Severe Pain (7 - 10)      Vital Signs Last 24 Hrs  T(C): 37.2 (17 Feb 2021 12:00), Max: 37.5 (17 Feb 2021 00:16)  T(F): 98.9 (17 Feb 2021 12:00), Max: 99.5 (17 Feb 2021 00:16)  HR: 100 (17 Feb 2021 13:00) (86 - 123)  BP: 116/61 (17 Feb 2021 12:00) (112/56 - 159/65)  BP(mean): 78 (17 Feb 2021 12:00) (73 - 91)  RR: 20 (17 Feb 2021 13:00) (15 - 37)  SpO2: 98% (17 Feb 2021 13:00) (92% - 98%)  ICU Vital Signs Last 24 Hrs    PHYSICAL EXAM:  General: Appears well developed, well nourished alert and cooperative.  HEENT: Head; normocephalic, atraumatic.Pupils reactive, cornea wnl. Neck supple, no nodes adenopathy, no JVD  CARDIOVASCULAR: Normal S1 and S2, 1/6 LION, no rub, gallop or lift.   LUNGS: No rales, rhonchi or wheeze. Normal breath sounds bilaterally.  ABDOMEN: Soft, nontender without mass or organomegaly. bowel sounds normoactive.  EXTREMITIES: No clubbing, cyanosis (+) anasarca   SKIN: warm and dry with normal turgor.  NEURO: Alert/oriented x 3/normal motor exam.   PSYCH: normal affect.        LABS:                        8.1    15.37 )-----------( 194      ( 17 Feb 2021 05:15 )             24.7     02-17    139  |  102  |  16.0  ----------------------------<  80  3.9   |  27.0  |  0.71    Ca    7.9<L>      17 Feb 2021 05:15  Phos  2.1     02-17  Mg     2.1     02-17      JAMAAL HERR  CARDIAC MARKERS ( 15 Feb 2021 23:18 )  x     / <0.01 ng/mL / x     / x     / x      CARDIAC MARKERS ( 15 Feb 2021 17:08 )  x     / <0.01 ng/mL / x     / x     / x          PT/INR - ( 15 Feb 2021 23:18 )   PT: 16.8 sec;   INR: 1.48 ratio         PTT - ( 17 Feb 2021 13:05 )  PTT:85.6 sec      RADIOLOGY & ADDITIONAL STUDIES:    ASSESSMENT AND PLAN:  In summary, JAMAAL HERR is a 80y Female with past medical history significant for CAD s/p prior stent to RCA with occlusion to be managed medically, HTN, PAD, L subclavian stenosis, mild-moderate AS recently hospitalized for cellulitis who presents with recurrent cellulitis found to have critical limb ischemia now s/p axillo-bifemoral and femoropopliteal bypass and right SFA and femoral endarterectomy with patch repair.  Currently hemodynamically stable, off pressors and extubated however with non-dopplerable pulses in the right distal lower extremity and now pending consideration for thrombectomy/embolectomy in an attempt for limb salvage      Critical limb ischemia/severe PAD/CAD  - s/p urgent intervention; continue heparin gtt, antiplatelet and statin therapy and now pending possible  thrombectomy/embolectomy in an attempt for limb salvage given increased morbidity with amputation   - close monitoring of pulses, and continued monitoring to progressive limb ischemic changes   - Prior cath with known RCA occlusion to be medically managed  - continue Toprol XL as well as Ranexa  - O2 support and wean as tolerated   - consider lasix 40mg IV given post resuscitation hypervolemia  - regarding her planned surgical procedure, she is at elevated risk however is without a direct cardiac contraindication for proceeding with potential limb salvaging intervention. No additional cardiac testing pre-operatively required   - glycemic control   - close monitoring of urine output s/p huynh placement     Thank you for allowing [facility] to participate in the care of this patient.  Please feel free to call with any questions or concerns.

## 2021-02-17 NOTE — PROGRESS NOTE ADULT - ASSESSMENT
A/P: 80 year old female with peripheral vascular disease s/p right femoral artery patch repair and endarterectomy with ax-fem, fem-pop PTFE bypass, admitted for cellulitis of RLE, with Surgery following for concern of rest pain. No major changes since initial evaluation.     -Neurovascular checks per SICU team  -Continue Ancef  -Continue low-rate heparin drip  -Elevate RLE  -Prevena to stay in place in right groin at least through 2/20  -Remainder of management per SICU

## 2021-02-17 NOTE — PROGRESS NOTE ADULT - ATTENDING COMMENTS
Patient was seen at bedside on 2/17/2021.  GCS15.  Extubated. Stable.  Hemodynamically stable off pressors. Start home meds including Lopressor.  Good UO. DC Basilio. Voiding trial.  PO diet. Glycemic control with home meds.  Fully heparinized with Heparin GTT per primary team.   Patient is scheduled for thrombectomy/graft embolectomy day after tomorrow for ischemic Rt lower extremity.

## 2021-02-17 NOTE — PROGRESS NOTE ADULT - SUBJECTIVE AND OBJECTIVE BOX
HPI/OVERNIGHT EVENTS: No acute events overnight. Patient extubated yesterday without issue. No new complaints or concerns. No fever, chills, chest pain, SOB, n/v/d.    MEDICATIONS  (STANDING):  aspirin 325 milliGRAM(s) Oral daily  atorvastatin 80 milliGRAM(s) Oral at bedtime  ceFAZolin   IVPB 2000 milliGRAM(s) IV Intermittent every 8 hours  chlorhexidine 2% Cloths 1 Application(s) Topical <User Schedule>  fenofibrate Tablet 145 milliGRAM(s) Oral daily  heparin  Infusion 800 Unit(s)/Hr (12 mL/Hr) IV Continuous <Continuous>  insulin glargine Injectable (LANTUS) 20 Unit(s) SubCutaneous at bedtime  insulin regular Infusion 4 Unit(s)/Hr (4 mL/Hr) IV Continuous <Continuous>  levothyroxine 88 MICROGram(s) Oral daily  multiple electrolytes Injection Type 1 1000 milliLiter(s) (75 mL/Hr) IV Continuous <Continuous>  senna 2 Tablet(s) Oral at bedtime    MEDICATIONS  (PRN):  acetaminophen   Tablet .. 325 milliGRAM(s) Oral every 4 hours PRN Mild Pain (1 - 3)  ondansetron Injectable 4 milliGRAM(s) IV Push every 6 hours PRN Nausea  oxycodone    5 mG/acetaminophen 325 mG 1 Tablet(s) Oral every 4 hours PRN Moderate Pain (4 - 6)  oxycodone    5 mG/acetaminophen 325 mG 2 Tablet(s) Oral every 4 hours PRN Severe Pain (7 - 10)      Vital Signs Last 24 Hrs  T(C): 37.5 (17 Feb 2021 00:16), Max: 37.5 (17 Feb 2021 00:16)  T(F): 99.5 (17 Feb 2021 00:16), Max: 99.5 (17 Feb 2021 00:16)  HR: 104 (17 Feb 2021 03:00) (80 - 105)  BP: 137/54 (17 Feb 2021 03:00) (88/52 - 159/65)  BP(mean): 76 (17 Feb 2021 03:00) (71 - 91)  RR: 29 (17 Feb 2021 03:00) (13 - 29)  SpO2: 96% (17 Feb 2021 03:00) (92% - 99%)    Constitutional: patient resting comfortably in bed, in no acute distress  HEENT: EOMI / PERRL b/l  Neck: No JVD, full ROM without pain  Respiratory: respirations are unlabored, no accessory muscle use  Cardiovascular: regular rate & rhythm  Gastrointestinal: Abdomen soft, non-tender, non-distended  Vasc: unable to palpate distal pulses. No signals of right lower extremity obtained at this time, RLE cool to touch below level of knee. Delayed capillary refill   Skin: surgical sites with dressing  in place without expanding hematoma, no strikethrough of dressing, R groin prevena in place      I&O's Detail    15 Feb 2021 07:01  -  16 Feb 2021 07:00  --------------------------------------------------------  IN:    Dexmedetomidine: 30 mL    FentaNYL: 80.1 mL    Heparin: 28 mL    IV PiggyBack: 100 mL    Lactated Ringers: 400 mL    multiple electrolytes Injection Type 1 Bolus: 1000 mL    multiple electrolytes Injection Type 1.: 800 mL    Norepinephrine: 107.5 mL    Phenylephrine: 51 mL    PRBCs (Packed Red Blood Cells): 300 mL    Propofol: 48 mL  Total IN: 2944.6 mL    OUT:    Indwelling Catheter - Urethral (mL): 1023 mL  Total OUT: 1023 mL    Total NET: 1921.6 mL      16 Feb 2021 07:01  -  17 Feb 2021 04:09  --------------------------------------------------------  IN:    Heparin: 152 mL    Insulin: 62 mL    IV PiggyBack: 50 mL    Oral Fluid: 390 mL  Total IN: 654 mL    OUT:    Indwelling Catheter - Urethral (mL): 1100 mL  Total OUT: 1100 mL    Total NET: -446 mL          LABS:                        8.9    15.30 )-----------( 196      ( 16 Feb 2021 17:28 )             27.5     02-16    141  |  107  |  23.0<H>  ----------------------------<  270<H>  5.1   |  23.0  |  0.91    Ca    7.9<L>      16 Feb 2021 03:22  Phos  3.3     02-16  Mg     1.4     02-16      PT/INR - ( 15 Feb 2021 23:18 )   PT: 16.8 sec;   INR: 1.48 ratio         PTT - ( 16 Feb 2021 23:41 )  PTT:56.0 sec

## 2021-02-17 NOTE — PROGRESS NOTE ADULT - SUBJECTIVE AND OBJECTIVE BOX
24h Events:  pt extubated successfully yesterday morning. Off vasopressors. Huynh removed yesterday afternoon, patient w/ urinary retention overnight, Huynh replaced. RLE remains cool, unable to doppler pulses. Vascular surgery aware. Insulin gtt started for blood gulose > 200 yesterday afternoon. Lantus started last night. Insulin gtt weaned off overnight. Continued on heparin gtt.    ICU Vital Signs Last 24 Hrs  T(C): 37.5 (17 Feb 2021 00:16), Max: 37.5 (17 Feb 2021 00:16)  T(F): 99.5 (17 Feb 2021 00:16), Max: 99.5 (17 Feb 2021 00:16)  HR: 104 (17 Feb 2021 03:00) (80 - 105)  BP: 137/54 (17 Feb 2021 03:00) (88/52 - 159/65)  BP(mean): 76 (17 Feb 2021 03:00) (71 - 91)  ABP: --  ABP(mean): --  RR: 29 (17 Feb 2021 03:00) (13 - 29)  SpO2: 96% (17 Feb 2021 03:00) (92% - 99%)    I&O's Detail    15 Feb 2021 07:01  -  16 Feb 2021 07:00  --------------------------------------------------------  IN:    Dexmedetomidine: 30 mL    FentaNYL: 80.1 mL    Heparin: 28 mL    IV PiggyBack: 100 mL    Lactated Ringers: 400 mL    multiple electrolytes Injection Type 1 Bolus: 1000 mL    multiple electrolytes Injection Type 1.: 800 mL    Norepinephrine: 107.5 mL    Phenylephrine: 51 mL    PRBCs (Packed Red Blood Cells): 300 mL    Propofol: 48 mL  Total IN: 2944.6 mL    OUT:    Indwelling Catheter - Urethral (mL): 1023 mL  Total OUT: 1023 mL    Total NET: 1921.6 mL    16 Feb 2021 07:01  -  17 Feb 2021 04:03  --------------------------------------------------------  IN:    Heparin: 152 mL    Insulin: 62 mL    IV PiggyBack: 50 mL    Oral Fluid: 390 mL  Total IN: 654 mL    OUT:    Indwelling Catheter - Urethral (mL): 1100 mL  Total OUT: 1100 mL    Total NET: -446 mL    ABG - ( 16 Feb 2021 00:36 )  pH, Arterial: 7.38  pH, Blood: x     /  pCO2: 38    /  pO2: 86    / HCO3: 22    / Base Excess: -2.4  /  SaO2: 97        MEDICATIONS  (STANDING):  aspirin 325 milliGRAM(s) Oral daily  atorvastatin 80 milliGRAM(s) Oral at bedtime  ceFAZolin   IVPB 2000 milliGRAM(s) IV Intermittent every 8 hours  chlorhexidine 2% Cloths 1 Application(s) Topical <User Schedule>  fenofibrate Tablet 145 milliGRAM(s) Oral daily  heparin  Infusion 800 Unit(s)/Hr (12 mL/Hr) IV Continuous <Continuous>  insulin glargine Injectable (LANTUS) 20 Unit(s) SubCutaneous at bedtime  insulin regular Infusion 4 Unit(s)/Hr (4 mL/Hr) IV Continuous <Continuous>  levothyroxine 88 MICROGram(s) Oral daily  multiple electrolytes Injection Type 1 1000 milliLiter(s) (75 mL/Hr) IV Continuous <Continuous>  senna 2 Tablet(s) Oral at bedtime    MEDICATIONS  (PRN):  acetaminophen   Tablet .. 325 milliGRAM(s) Oral every 4 hours PRN Mild Pain (1 - 3)  ondansetron Injectable 4 milliGRAM(s) IV Push every 6 hours PRN Nausea  oxycodone    5 mG/acetaminophen 325 mG 1 Tablet(s) Oral every 4 hours PRN Moderate Pain (4 - 6)  oxycodone    5 mG/acetaminophen 325 mG 2 Tablet(s) Oral every 4 hours PRN Severe Pain (7 - 10)    Physical Exam:    Gen: Resting comfortably in bed    HEENT: PERRL, EOMI    Neurological: Alert and oriented x3 without focal deficit    Neck: Trachea midline, no evidence of JVD, FROM without pain, neck symmetric    Pulmonary: CTAB with decreased breath sounds at the bases, strong cough    Cardiovascular: S1S2, no murmur, no rubs, no gallop    Gastrointestinal: Soft, non-tender, non-distended    : Huynh in place draining yellow urine    Extremities: b/l LE cool to touch, ulceration to R toes    Skin: see extremity exam    Musculoskeletal: FROM without pain, no deformity or areas of tenderness    LABS:  CBC Full  -  ( 16 Feb 2021 17:28 )  WBC Count : 15.30 K/uL  RBC Count : 2.97 M/uL  Hemoglobin : 8.9 g/dL  Hematocrit : 27.5 %  Platelet Count - Automated : 196 K/uL  Mean Cell Volume : 92.6 fl  Mean Cell Hemoglobin : 30.0 pg  Mean Cell Hemoglobin Concentration : 32.4 gm/dL  Auto Neutrophil # : x  Auto Lymphocyte # : x  Auto Monocyte # : x  Auto Eosinophil # : x  Auto Basophil # : x  Auto Neutrophil % : x  Auto Lymphocyte % : x  Auto Monocyte % : x  Auto Eosinophil % : x  Auto Basophil % : x    02-16    141  |  107  |  23.0<H>  ----------------------------<  270<H>  5.1   |  23.0  |  0.91    Ca    7.9<L>      16 Feb 2021 03:22  Phos  3.3     02-16  Mg     1.4     02-16    PT/INR - ( 15 Feb 2021 23:18 )   PT: 16.8 sec;   INR: 1.48 ratio      PTT - ( 16 Feb 2021 23:41 )  PTT:56.0 sec    RECENT CULTURES:    CARDIAC MARKERS ( 15 Feb 2021 23:18 )  x     / <0.01 ng/mL / x     / x     / x      CARDIAC MARKERS ( 15 Feb 2021 17:08 )  x     / <0.01 ng/mL / x     / x     / x          ASSESSMENT/PLAN:  80y Female w/ hx of PVD s/p R femoral bypass (2003), admitted with RLE ischemia s/p R ax to fem and fem-pop bypass.     Neuro: Pain control with percocet, delirium precautions, sleep hygiene with melatonin    CV: off vasopressors, remains normotensive, q1 hr vasc checks, continue home fenofibrate and statin    Pulm: continue aggressive pulm toilet, encourage cough and deep breathing, IS 10 x hr while awake    GI/Nutrition: tolerating DASH/DM diet, start bowel regimen with senna and Miralax for constipation    /Renal: Failed TOV, huynh replaced. Monitor UOP.    ID: ancef continued, monitor for fever, trend leukocytosis    Endo: off insulin gtt @ 4 AM, continue lantus, ISS re-adjusted, continue synthroid    Skin: Repositioning for DTI prevention while in bed    Heme/DVT Prophylaxis: SCDs, continue hep gtt, f/u 6:30 PTT,     Lines/Tubes: RIJ TLC can likely be removed today - pt HD stable, huynh    Dispo: DNR, Care per SICU

## 2021-02-18 NOTE — PROGRESS NOTE ADULT - SUBJECTIVE AND OBJECTIVE BOX
[facility]                                   [address] [place] [address]                                                      PHONE: [phone]                                                         FAX: [phone]  [email or web address]  ---------------------------------------------------------------------------------------------------------------------------------    reason for follow-up: PAD, hx CAD     Overnight events/patient complaints: Patient with an acute change in her clinical status overnight with sudden hypotension and tachycardia requiring initiation of pressor support. Patient additionally with worsening hypoxic respiratory failure now on venti mask.   Found to have troponin elevation to 0.29. She denies chest pain.     INTERPRETATION OF TELEMETRY (personally reviewed): sinus tachycardia     ECHO: < from: TTE Echo Complete w/ Contrast w/ Doppler (02.16.21 @ 11:56) >   1. Technically difficult study with poor endocardial visualization requiring use of IV echo contrast to enhance endocardial visualization.   2. Normal global left ventricular systolic function.   3. Left ventricular ejection fraction, by visual estimation, is 50 to 55%.   4. Mildly increased LV wall thickness.   5. Normal left ventricular internal cavity size.   6. Abnormal septal motion which may be due to conduction delay.   7. Mild thickening and calcification of the anterior and posterior mitral valve leaflets.   8. Moderate mitral annular calcification.   9. The pericardium was not well visualized.  10. The aortic valve is not well visualized, appears to have calcified leaflets with decreased opening.  11. Recommend clinical correlation with the above findings.    I&O's Summary    17 Feb 2021 07:01  -  18 Feb 2021 07:00  --------------------------------------------------------  IN: 3251.8 mL / OUT: 1325 mL / NET: 1926.8 mL    18 Feb 2021 07:01  -  18 Feb 2021 09:30  --------------------------------------------------------  IN: 97.4 mL / OUT: 40 mL / NET: 57.4 mL      PAST MEDICAL & SURGICAL HISTORY:  PVD (peripheral vascular disease)  DM (diabetes mellitus)  Type 2 diabetes mellitus with other specified complication, unspecified whether long term insulin use  Hyperlipidemia, unspecified hyperlipidemia type  Coronary artery disease, angina presence unspecified, unspecified vessel or lesion type, unspecified whether native or transplanted heart  Pulmonary emphysema, unspecified emphysema type    No Known Allergies    MEDICATIONS  (STANDING):  aspirin 325 milliGRAM(s) Oral daily  atorvastatin 80 milliGRAM(s) Oral at bedtime  ceFAZolin   IVPB 2000 milliGRAM(s) IV Intermittent every 8 hours  chlorhexidine 2% Cloths 1 Application(s) Topical <User Schedule>  dextrose 40% Gel 15 Gram(s) Oral once  dextrose 5%. 1000 milliLiter(s) (50 mL/Hr) IV Continuous <Continuous>  dextrose 5%. 1000 milliLiter(s) (100 mL/Hr) IV Continuous <Continuous>  dextrose 50% Injectable 25 Gram(s) IV Push once  dextrose 50% Injectable 12.5 Gram(s) IV Push once  dextrose 50% Injectable 25 Gram(s) IV Push once  fenofibrate Tablet 145 milliGRAM(s) Oral daily  glucagon  Injectable 1 milliGRAM(s) IntraMuscular once  heparin  Infusion. 1400 Unit(s)/Hr (14 mL/Hr) IV Continuous <Continuous>  hydrocortisone sodium succinate Injectable 100 milliGRAM(s) IV Push once  insulin glargine Injectable (LANTUS) 20 Unit(s) SubCutaneous at bedtime  insulin lispro (ADMELOG) corrective regimen sliding scale   SubCutaneous three times a day before meals  levothyroxine 88 MICROGram(s) Oral daily  metoprolol tartrate Injectable 2.5 milliGRAM(s) IV Push once  phenylephrine    Infusion 0.5 MICROgram(s)/kG/Min (18.1 mL/Hr) IV Continuous <Continuous>  polyethylene glycol 3350 17 Gram(s) Oral daily  senna 2 Tablet(s) Oral at bedtime  tamsulosin 0.4 milliGRAM(s) Oral at bedtime  vasopressin Infusion 0.04 Unit(s)/Min (2.4 mL/Hr) IV Continuous <Continuous>    MEDICATIONS  (PRN):  acetaminophen   Tablet .. 975 milliGRAM(s) Oral every 6 hours PRN Mild Pain (1 - 3)  heparin   Injectable 8000 Unit(s) IV Push every 6 hours PRN For aPTT less than 40  heparin   Injectable 4000 Unit(s) IV Push every 6 hours PRN For aPTT between 40 - 57  HYDROmorphone  Injectable 0.5 milliGRAM(s) IV Push every 4 hours PRN Breakthrough pain  ondansetron Injectable 4 milliGRAM(s) IV Push every 6 hours PRN Nausea  oxyCODONE    IR 5 milliGRAM(s) Oral every 6 hours PRN Moderate Pain (4 - 6)  oxyCODONE    IR 10 milliGRAM(s) Oral every 6 hours PRN Severe Pain (7 - 10)      Vital Signs Last 24 Hrs  T(C): 36.8 (18 Feb 2021 07:26), Max: 37.3 (18 Feb 2021 04:00)  T(F): 98.3 (18 Feb 2021 07:26), Max: 99.1 (18 Feb 2021 04:00)  HR: 121 (18 Feb 2021 09:00) (98 - 130)  BP: 97/53 (18 Feb 2021 08:00) (65/42 - 126/61)  BP(mean): 67 (18 Feb 2021 08:00) (48 - 89)  RR: 21 (18 Feb 2021 09:00) (17 - 44)  SpO2: 96% (18 Feb 2021 09:00) (91% - 99%)  ICU Vital Signs Last 24 Hrs    PHYSICAL EXAM:  General: tachyp[neic, chronically ill appearing   HEENT: Head; normocephalic, atraumatic.Pupils reactive, cornea wnl.  (+) JVD  CARDIOVASCULAR: Normal S1 and S2, 2/6 LION, no rub, gallop or lift.   LUNGS: tachypneic, poor effort, decreased at the bases   ABDOMEN: Soft, nontender without mass or organomegaly. bowel sounds normoactive.  EXTREMITIES: No clubbing, RLE with diminished pulses (+) anasarca   SKIN: warm and dry with normal turgor.  PSYCH: lethargic         LABS:                        7.1    13.79 )-----------( 215      ( 18 Feb 2021 08:27 )             21.2     02-18    135  |  99  |  16.0  ----------------------------<  78  4.0   |  28.0  |  0.75    Ca    7.4<L>      18 Feb 2021 08:28  Phos  2.5     02-18  Mg     2.0     02-18    TPro  4.7<L>  /  Alb  2.2<L>  /  TBili  0.3<L>  /  DBili  x   /  AST  339<H>  /  ALT  134<H>  /  AlkPhos  52  02-18    JAMAAL HERR  CARDIAC MARKERS ( 18 Feb 2021 08:28 )  x     / 0.29 ng/mL / 7352 U/L / x     / 27.2 ng/mL  CARDIAC MARKERS ( 18 Feb 2021 01:37 )  x     / 0.26 ng/mL / x     / x     / x          PT/INR - ( 18 Feb 2021 01:36 )   PT: 16.3 sec;   INR: 1.43 ratio         PTT - ( 18 Feb 2021 08:27 )  PTT:91.6 sec      RADIOLOGY & ADDITIONAL STUDIES:    ASSESSMENT AND PLAN:  In summary, JAMAAL HERR is a 80y Female with past medical history significant for CAD s/p prior stent to RCA with occlusion to be managed medically, HTN, PAD, L subclavian stenosis, mild-moderate AS recently hospitalized for cellulitis who presents with recurrent cellulitis found to have critical limb ischemia now s/p axillo-bifemoral and femoropopliteal bypass and right SFA and femoral endarterectomy with patch repair now hemodynamically unstable, with worsening hypoxic respiratory failure and troponin elevation     Troponin elevation/hypoxic respiratory failure  - suspect related to type II MI in the setting of critical illness, in patient with known residual obstructive CAD, however cannot rule out plaque rupture  - patient is already on heparin gtt, antiplatelet and statin therapy; continue  - Prior cath with known RCA occlusion to be medically managed  - TTE for interval LVEF assessment   - serial cardiac enzymes  - additionally noted to have Hgb <7, transfuse and serial CBC  - consider lasix 40mg IV given post resuscitation hypervolemia  - care per SICU  - GOC discussion re: intubation given her significant increased work of breathing      Critical limb ischemia/severe PAD/CAD  - s/p urgent intervention; continue heparin gtt, antiplatelet and statin therapy and now pending possible  thrombectomy/embolectomy in an attempt for limb salvage, however given current hemodynamic instability she is NOT optimized for the OR    - hold Toprol XL as well as Ranexa  - glycemic control   - close monitoring of urine output s/p huynh placement    Thank you for allowing [facility] to participate in the care of this patient.  Please feel free to call with any questions or concerns. Patient is critically ill and at increased risk for morbidity/mortality. Case discussed with SICU team. Time spent 35mins.

## 2021-02-18 NOTE — PROVIDER CONTACT NOTE (EICU) - ACTION/TREATMENT ORDERED:
E-alerted by bedside team requested NS 500cc bolus order and levophed order, order placed as requested

## 2021-02-18 NOTE — PROGRESS NOTE ADULT - ATTENDING COMMENTS
Pt seen and examined. Pt with acute decompensation overnight with BP drop requiring pressors. Likely NSTEMI   Vascular exam unchanged    Plan:  care as per SICU  Cardiology  Continue heparin gtt  Surgery likely to be cancelled tomorrow given new developments  Willl communicate with the SICU team throughout the day

## 2021-02-18 NOTE — PROGRESS NOTE ADULT - SUBJECTIVE AND OBJECTIVE BOX
24h Events:  Patient with no acute events yesterday. Tolerating diet, OOB to chair during the day, still with hyperglycemia - blood glucose > 200 x 3 throughout the day. Insulin gtt restarted yesterday evening. Overnight, Pt noted to be acutely  hypotensive with worsening tachycardia. Pt sleeping, but responding to loud voice. pt given small volume challenge without change in BP. POCUS exam revealed non variable IVC, POCUS echocardiography appeared hypodynamic. Levophed started to maintain MAP > 65. Stat labs sent, notable for elevated troponin (0.29). EKG w/ sinus tachycardia, QRS slightly widened (122 ms). pt had previously been therapeutic on hep gtt, however repeat PTT at this time subtherapeutic, rate of heparin gtt increased. Pt with worsening tachycardia on levophed. Transitioned to phenylephrine in effort to decrease beta adrenergic stimulation. Pt quickly requiring increasing doses of gila. Vasopressin added. Additionally, pt with increased FIO2 requirements, requiring 6L to maintain SpO2 88%. Pt placed on 50% venti mask with good response. Despite acute NSTEMI, patient denies chest pain, increased work of breathing, palpitations, dizziness, or headache.     ICU Vital Signs Last 24 Hrs  T(C): 36.7 (17 Feb 2021 23:36), Max: 37.3 (17 Feb 2021 08:00)  T(F): 98.1 (17 Feb 2021 23:36), Max: 99.1 (17 Feb 2021 08:00)  HR: 119 (18 Feb 2021 02:53) (100 - 128)  BP: 121/67 (18 Feb 2021 02:53) (65/42 - 150/83)  BP(mean): 85 (18 Feb 2021 02:53) (48 - 90)  ABP: --  ABP(mean): --  RR: 26 (18 Feb 2021 02:53) (15 - 37)  SpO2: 98% (18 Feb 2021 02:53) (91% - 99%)    I&O's Detail    16 Feb 2021 07:01  -  17 Feb 2021 07:00  --------------------------------------------------------  IN:    Heparin: 188 mL    Insulin: 62 mL    IV PiggyBack: 50 mL    multiple electrolytes Injection Type 1.: 825 mL    Oral Fluid: 390 mL  Total IN: 1515 mL    OUT:    Indwelling Catheter - Urethral (mL): 1400 mL  Total OUT: 1400 mL    Total NET: 115 mL    17 Feb 2021 07:01  -  18 Feb 2021 02:56  --------------------------------------------------------  IN:    Heparin: 230 mL    Insulin: 30 mL    IV PiggyBack: 416.5 mL    IV PiggyBack: 50 mL    multiple electrolytes Injection Type 1.: 75 mL    Sodium Chloride 0.9% Bolus: 1000 mL  Total IN: 1801.5 mL    OUT:    Indwelling Catheter - Urethral (mL): 1090 mL  Total OUT: 1090 mL    Total NET: 711.5 mL    MEDICATIONS  (STANDING):  aspirin 325 milliGRAM(s) Oral daily  atorvastatin 80 milliGRAM(s) Oral at bedtime  ceFAZolin   IVPB 2000 milliGRAM(s) IV Intermittent every 8 hours  chlorhexidine 2% Cloths 1 Application(s) Topical <User Schedule>  cilostazol 100 milliGRAM(s) Oral every 12 hours  fenofibrate Tablet 145 milliGRAM(s) Oral daily  gabapentin 100 milliGRAM(s) Oral every 6 hours  heparin  Infusion. 1400 Unit(s)/Hr (14 mL/Hr) IV Continuous <Continuous>  insulin glargine Injectable (LANTUS) 20 Unit(s) SubCutaneous at bedtime  insulin regular Infusion 5 Unit(s)/Hr (5 mL/Hr) IV Continuous <Continuous>  levothyroxine 88 MICROGram(s) Oral daily  metoprolol tartrate 50 milliGRAM(s) Oral every 12 hours  phenylephrine    Infusion 0.5 MICROgram(s)/kG/Min (18.1 mL/Hr) IV Continuous <Continuous>  polyethylene glycol 3350 17 Gram(s) Oral daily  ranolazine 500 milliGRAM(s) Oral two times a day  senna 2 Tablet(s) Oral at bedtime  tamsulosin 0.4 milliGRAM(s) Oral at bedtime  vasopressin Infusion 0.04 Unit(s)/Min (2.4 mL/Hr) IV Continuous <Continuous>    MEDICATIONS  (PRN):  acetaminophen   Tablet .. 975 milliGRAM(s) Oral every 6 hours PRN Mild Pain (1 - 3)  heparin   Injectable 8000 Unit(s) IV Push every 6 hours PRN For aPTT less than 40  heparin   Injectable 4000 Unit(s) IV Push every 6 hours PRN For aPTT between 40 - 57  HYDROmorphone  Injectable 0.5 milliGRAM(s) IV Push every 4 hours PRN Breakthrough pain  ondansetron Injectable 4 milliGRAM(s) IV Push every 6 hours PRN Nausea  oxyCODONE    IR 5 milliGRAM(s) Oral every 6 hours PRN Moderate Pain (4 - 6)  oxyCODONE    IR 10 milliGRAM(s) Oral every 6 hours PRN Severe Pain (7 - 10)    Physical Exam:    Gen: chronically ill appearing    HEENT: PERRL, EOMI    Neurological: lethargic, responding to loud voice, following simple commands    Neck: Trachea midline, no evidence of JVD, FROM without pain, neck symmetric    Pulmonary: strong cough, breath sounds diminished at bases, on 50% venti mask    Cardiovascular: tachycardic, sinus rhythm. No murmurs, rubs or gallops appreciated on auscultation    Gastrointestinal: obese, soft, non-tender    : Basilio in place draining yellow urine    Extremities: Without c/c/e    Skin: R axillary cutdown site with dressing in place, clean/dry/intact, RLE discolored with PVD ulceration to great toe    Musculoskeletal: FROM without pain, no deformity or areas of tenderness    LABS:  CBC Full  -  ( 18 Feb 2021 01:37 )  WBC Count : 12.12 K/uL  RBC Count : 2.42 M/uL  Hemoglobin : 7.3 g/dL  Hematocrit : 22.4 %  Platelet Count - Automated : 196 K/uL  Mean Cell Volume : 92.6 fl  Mean Cell Hemoglobin : 30.2 pg  Mean Cell Hemoglobin Concentration : 32.6 gm/dL  Auto Neutrophil # : 9.02 K/uL  Auto Lymphocyte # : 1.91 K/uL  Auto Monocyte # : 0.92 K/uL  Auto Eosinophil # : 0.08 K/uL  Auto Basophil # : 0.03 K/uL  Auto Neutrophil % : 74.4 %  Auto Lymphocyte % : 15.8 %  Auto Monocyte % : 7.6 %  Auto Eosinophil % : 0.7 %  Auto Basophil % : 0.2 %    02-18    137  |  101  |  17.0  ----------------------------<  133<H>  4.0   |  26.0  |  0.90    Ca    7.8<L>      18 Feb 2021 01:37  Phos  2.5     02-18  Mg     2.0     02-18    PT/INR - ( 18 Feb 2021 01:36 )   PT: 16.3 sec;   INR: 1.43 ratio      PTT - ( 18 Feb 2021 01:36 )  PTT:53.5 sec    RECENT CULTURES:    CARDIAC MARKERS ( 18 Feb 2021 01:37 )  x     / 0.26 ng/mL / x     / x     / x        ASSESSMENT/PLAN:  80y Female w/ severe peripheral vascular disease admitted with     Neuro: Pain control, delirium precautions, sleep hygiene     CV:     Pulm:     GI/Nutrition:     /Renal: [place] for strict I&O    ID:     Endo:     Skin: Repositioning for DTI prevention while in bed    Heme/DVT Prophylaxis: SCDs    Lines/Tubes:     Dispo:      24h Events:  Patient with no acute events yesterday. Tolerating diet, OOB to chair during the day, still with hyperglycemia - blood glucose > 200 x 3 throughout the day. Insulin gtt restarted yesterday evening. Overnight, Pt noted to be acutely  hypotensive with worsening tachycardia. Pt sleeping, but responding to loud voice. pt given small volume challenge without change in BP. POCUS exam revealed non variable IVC, POCUS echocardiography appeared hypodynamic. Levophed started to maintain MAP > 65. Stat labs sent, notable for elevated troponin (0.29). EKG w/ sinus tachycardia, QRS slightly widened (122 ms). pt had previously been therapeutic on hep gtt, however repeat PTT at this time subtherapeutic, rate of heparin gtt increased. Pt with worsening tachycardia on levophed. Transitioned to phenylephrine in effort to decrease beta adrenergic stimulation. Pt quickly requiring increasing doses of gila. Vasopressin added. Additionally, pt with increased FIO2 requirements, requiring 6L to maintain SpO2 88%. Pt placed on 50% venti mask with good response. Despite acute NSTEMI, patient denies chest pain, increased work of breathing, palpitations, dizziness, or headache.     ICU Vital Signs Last 24 Hrs  T(C): 36.7 (17 Feb 2021 23:36), Max: 37.3 (17 Feb 2021 08:00)  T(F): 98.1 (17 Feb 2021 23:36), Max: 99.1 (17 Feb 2021 08:00)  HR: 119 (18 Feb 2021 02:53) (100 - 128)  BP: 121/67 (18 Feb 2021 02:53) (65/42 - 150/83)  BP(mean): 85 (18 Feb 2021 02:53) (48 - 90)  ABP: --  ABP(mean): --  RR: 26 (18 Feb 2021 02:53) (15 - 37)  SpO2: 98% (18 Feb 2021 02:53) (91% - 99%)    I&O's Detail    16 Feb 2021 07:01  -  17 Feb 2021 07:00  --------------------------------------------------------  IN:    Heparin: 188 mL    Insulin: 62 mL    IV PiggyBack: 50 mL    multiple electrolytes Injection Type 1.: 825 mL    Oral Fluid: 390 mL  Total IN: 1515 mL    OUT:    Indwelling Catheter - Urethral (mL): 1400 mL  Total OUT: 1400 mL    Total NET: 115 mL    17 Feb 2021 07:01  -  18 Feb 2021 02:56  --------------------------------------------------------  IN:    Heparin: 230 mL    Insulin: 30 mL    IV PiggyBack: 416.5 mL    IV PiggyBack: 50 mL    multiple electrolytes Injection Type 1.: 75 mL    Sodium Chloride 0.9% Bolus: 1000 mL  Total IN: 1801.5 mL    OUT:    Indwelling Catheter - Urethral (mL): 1090 mL  Total OUT: 1090 mL    Total NET: 711.5 mL    MEDICATIONS  (STANDING):  aspirin 325 milliGRAM(s) Oral daily  atorvastatin 80 milliGRAM(s) Oral at bedtime  ceFAZolin   IVPB 2000 milliGRAM(s) IV Intermittent every 8 hours  chlorhexidine 2% Cloths 1 Application(s) Topical <User Schedule>  cilostazol 100 milliGRAM(s) Oral every 12 hours  fenofibrate Tablet 145 milliGRAM(s) Oral daily  gabapentin 100 milliGRAM(s) Oral every 6 hours  heparin  Infusion. 1400 Unit(s)/Hr (14 mL/Hr) IV Continuous <Continuous>  insulin glargine Injectable (LANTUS) 20 Unit(s) SubCutaneous at bedtime  insulin regular Infusion 5 Unit(s)/Hr (5 mL/Hr) IV Continuous <Continuous>  levothyroxine 88 MICROGram(s) Oral daily  metoprolol tartrate 50 milliGRAM(s) Oral every 12 hours  phenylephrine    Infusion 0.5 MICROgram(s)/kG/Min (18.1 mL/Hr) IV Continuous <Continuous>  polyethylene glycol 3350 17 Gram(s) Oral daily  ranolazine 500 milliGRAM(s) Oral two times a day  senna 2 Tablet(s) Oral at bedtime  tamsulosin 0.4 milliGRAM(s) Oral at bedtime  vasopressin Infusion 0.04 Unit(s)/Min (2.4 mL/Hr) IV Continuous <Continuous>    MEDICATIONS  (PRN):  acetaminophen   Tablet .. 975 milliGRAM(s) Oral every 6 hours PRN Mild Pain (1 - 3)  heparin   Injectable 8000 Unit(s) IV Push every 6 hours PRN For aPTT less than 40  heparin   Injectable 4000 Unit(s) IV Push every 6 hours PRN For aPTT between 40 - 57  HYDROmorphone  Injectable 0.5 milliGRAM(s) IV Push every 4 hours PRN Breakthrough pain  ondansetron Injectable 4 milliGRAM(s) IV Push every 6 hours PRN Nausea  oxyCODONE    IR 5 milliGRAM(s) Oral every 6 hours PRN Moderate Pain (4 - 6)  oxyCODONE    IR 10 milliGRAM(s) Oral every 6 hours PRN Severe Pain (7 - 10)    Physical Exam:    Gen: chronically ill appearing    HEENT: PERRL, EOMI    Neurological: lethargic, responding to loud voice, following simple commands    Neck: Trachea midline, no evidence of JVD, FROM without pain, neck symmetric    Pulmonary: strong cough, breath sounds diminished at bases, on 50% venti mask    Cardiovascular: tachycardic, sinus rhythm. No murmurs, rubs or gallops appreciated on auscultation    Gastrointestinal: obese, soft, non-tender    : Basilio in place draining yellow urine    Extremities: Without c/c/e    Skin: R axillary cutdown site with dressing in place, clean/dry/intact, RLE discolored with PVD ulceration to great toe    Musculoskeletal: FROM without pain, no deformity or areas of tenderness    LABS:  CBC Full  -  ( 18 Feb 2021 01:37 )  WBC Count : 12.12 K/uL  RBC Count : 2.42 M/uL  Hemoglobin : 7.3 g/dL  Hematocrit : 22.4 %  Platelet Count - Automated : 196 K/uL  Mean Cell Volume : 92.6 fl  Mean Cell Hemoglobin : 30.2 pg  Mean Cell Hemoglobin Concentration : 32.6 gm/dL  Auto Neutrophil # : 9.02 K/uL  Auto Lymphocyte # : 1.91 K/uL  Auto Monocyte # : 0.92 K/uL  Auto Eosinophil # : 0.08 K/uL  Auto Basophil # : 0.03 K/uL  Auto Neutrophil % : 74.4 %  Auto Lymphocyte % : 15.8 %  Auto Monocyte % : 7.6 %  Auto Eosinophil % : 0.7 %  Auto Basophil % : 0.2 %    02-18    137  |  101  |  17.0  ----------------------------<  133<H>  4.0   |  26.0  |  0.90    Ca    7.8<L>      18 Feb 2021 01:37  Phos  2.5     02-18  Mg     2.0     02-18    PT/INR - ( 18 Feb 2021 01:36 )   PT: 16.3 sec;   INR: 1.43 ratio      PTT - ( 18 Feb 2021 01:36 )  PTT:53.5 sec    RECENT CULTURES:    CARDIAC MARKERS ( 18 Feb 2021 01:37 )  x     / 0.26 ng/mL / x     / x     / x        ASSESSMENT/PLAN:  80y Female w/ severe peripheral vascular disease admitted with RLE cellulitis     Neuro: Pain control, delirium precautions, sleep hygiene     CV:     Pulm:     GI/Nutrition:     /Renal: Basilio for strict I&O    ID:     Endo:     Skin: Repositioning for DTI prevention while in bed    Heme/DVT Prophylaxis: SCDs    Lines/Tubes:     Dispo:      24h Events:  Patient with no acute events yesterday. Tolerating diet, OOB to chair during the day, still with hyperglycemia - blood glucose > 200 x 3 throughout the day. Insulin gtt restarted yesterday evening. Overnight, Pt noted to be acutely  hypotensive with worsening tachycardia. Pt sleeping, but responding to loud voice. pt given small volume challenge without change in BP. POCUS exam revealed non variable IVC, POCUS echocardiography appeared hypodynamic. Levophed started to maintain MAP > 65. Stat labs sent, notable for elevated troponin (0.29). EKG w/ sinus tachycardia, QRS slightly widened (122 ms). pt had previously been therapeutic on hep gtt, however repeat PTT at this time subtherapeutic, rate of heparin gtt increased. Pt with worsening tachycardia on levophed. Transitioned to phenylephrine in effort to decrease beta adrenergic stimulation. Pt quickly requiring increasing doses of gila. Vasopressin added. Additionally, pt with increased FIO2 requirements, requiring 6L to maintain SpO2 88%. Pt placed on 50% venti mask with good response. Despite acute NSTEMI, patient denies chest pain, increased work of breathing, palpitations, dizziness, or headache.     ICU Vital Signs Last 24 Hrs  T(C): 36.7 (17 Feb 2021 23:36), Max: 37.3 (17 Feb 2021 08:00)  T(F): 98.1 (17 Feb 2021 23:36), Max: 99.1 (17 Feb 2021 08:00)  HR: 119 (18 Feb 2021 02:53) (100 - 128)  BP: 121/67 (18 Feb 2021 02:53) (65/42 - 150/83)  BP(mean): 85 (18 Feb 2021 02:53) (48 - 90)  ABP: --  ABP(mean): --  RR: 26 (18 Feb 2021 02:53) (15 - 37)  SpO2: 98% (18 Feb 2021 02:53) (91% - 99%)    I&O's Detail    16 Feb 2021 07:01  -  17 Feb 2021 07:00  --------------------------------------------------------  IN:    Heparin: 188 mL    Insulin: 62 mL    IV PiggyBack: 50 mL    multiple electrolytes Injection Type 1.: 825 mL    Oral Fluid: 390 mL  Total IN: 1515 mL    OUT:    Indwelling Catheter - Urethral (mL): 1400 mL  Total OUT: 1400 mL    Total NET: 115 mL    17 Feb 2021 07:01  -  18 Feb 2021 02:56  --------------------------------------------------------  IN:    Heparin: 230 mL    Insulin: 30 mL    IV PiggyBack: 416.5 mL    IV PiggyBack: 50 mL    multiple electrolytes Injection Type 1.: 75 mL    Sodium Chloride 0.9% Bolus: 1000 mL  Total IN: 1801.5 mL    OUT:    Indwelling Catheter - Urethral (mL): 1090 mL  Total OUT: 1090 mL    Total NET: 711.5 mL    MEDICATIONS  (STANDING):  aspirin 325 milliGRAM(s) Oral daily  atorvastatin 80 milliGRAM(s) Oral at bedtime  ceFAZolin   IVPB 2000 milliGRAM(s) IV Intermittent every 8 hours  chlorhexidine 2% Cloths 1 Application(s) Topical <User Schedule>  cilostazol 100 milliGRAM(s) Oral every 12 hours  fenofibrate Tablet 145 milliGRAM(s) Oral daily  gabapentin 100 milliGRAM(s) Oral every 6 hours  heparin  Infusion. 1400 Unit(s)/Hr (14 mL/Hr) IV Continuous <Continuous>  insulin glargine Injectable (LANTUS) 20 Unit(s) SubCutaneous at bedtime  insulin regular Infusion 5 Unit(s)/Hr (5 mL/Hr) IV Continuous <Continuous>  levothyroxine 88 MICROGram(s) Oral daily  metoprolol tartrate 50 milliGRAM(s) Oral every 12 hours  phenylephrine    Infusion 0.5 MICROgram(s)/kG/Min (18.1 mL/Hr) IV Continuous <Continuous>  polyethylene glycol 3350 17 Gram(s) Oral daily  ranolazine 500 milliGRAM(s) Oral two times a day  senna 2 Tablet(s) Oral at bedtime  tamsulosin 0.4 milliGRAM(s) Oral at bedtime  vasopressin Infusion 0.04 Unit(s)/Min (2.4 mL/Hr) IV Continuous <Continuous>    MEDICATIONS  (PRN):  acetaminophen   Tablet .. 975 milliGRAM(s) Oral every 6 hours PRN Mild Pain (1 - 3)  heparin   Injectable 8000 Unit(s) IV Push every 6 hours PRN For aPTT less than 40  heparin   Injectable 4000 Unit(s) IV Push every 6 hours PRN For aPTT between 40 - 57  HYDROmorphone  Injectable 0.5 milliGRAM(s) IV Push every 4 hours PRN Breakthrough pain  ondansetron Injectable 4 milliGRAM(s) IV Push every 6 hours PRN Nausea  oxyCODONE    IR 5 milliGRAM(s) Oral every 6 hours PRN Moderate Pain (4 - 6)  oxyCODONE    IR 10 milliGRAM(s) Oral every 6 hours PRN Severe Pain (7 - 10)    Physical Exam:    Gen: chronically ill appearing    HEENT: PERRL, EOMI    Neurological: lethargic, responding to loud voice, following simple commands    Neck: Trachea midline, no evidence of JVD, FROM without pain, neck symmetric    Pulmonary: strong cough, breath sounds diminished at bases, on 50% venti mask    Cardiovascular: tachycardic, sinus rhythm. No murmurs, rubs or gallops appreciated on auscultation    Gastrointestinal: obese, soft, non-tender    : Huynh in place draining yellow urine    Extremities: Without c/c/e    Skin: R axillary cutdown site with dressing in place, clean/dry/intact, RLE discolored with PVD ulceration to great toe    Musculoskeletal: FROM without pain, no deformity or areas of tenderness    LABS:  CBC Full  -  ( 18 Feb 2021 01:37 )  WBC Count : 12.12 K/uL  RBC Count : 2.42 M/uL  Hemoglobin : 7.3 g/dL  Hematocrit : 22.4 %  Platelet Count - Automated : 196 K/uL  Mean Cell Volume : 92.6 fl  Mean Cell Hemoglobin : 30.2 pg  Mean Cell Hemoglobin Concentration : 32.6 gm/dL  Auto Neutrophil # : 9.02 K/uL  Auto Lymphocyte # : 1.91 K/uL  Auto Monocyte # : 0.92 K/uL  Auto Eosinophil # : 0.08 K/uL  Auto Basophil # : 0.03 K/uL  Auto Neutrophil % : 74.4 %  Auto Lymphocyte % : 15.8 %  Auto Monocyte % : 7.6 %  Auto Eosinophil % : 0.7 %  Auto Basophil % : 0.2 %    02-18    137  |  101  |  17.0  ----------------------------<  133<H>  4.0   |  26.0  |  0.90    Ca    7.8<L>      18 Feb 2021 01:37  Phos  2.5     02-18  Mg     2.0     02-18    PT/INR - ( 18 Feb 2021 01:36 )   PT: 16.3 sec;   INR: 1.43 ratio      PTT - ( 18 Feb 2021 01:36 )  PTT:53.5 sec    RECENT CULTURES:    CARDIAC MARKERS ( 18 Feb 2021 01:37 )  x     / 0.26 ng/mL / x     / x     / x        ASSESSMENT/PLAN:  80y Female w/ DM, HLD, CAD, L subclavian steel, RCA stent, and severe peripheral vascular disease admitted with RLE cellulitis, surgery consulted for RLE ischemia. Pt went to the OR on 2/15 with vascular surgery and underwent a R ax-fem/fem-pop bypass, and admitted to SICU post operatively. Pt remains w/ cool RLE without signals, plan to return to OR for possible embolectomy. Overnight, pt with acute hypotension and elevated troponin, concerning for NSTEMI.     Neuro: Pain control with multimodal analgesia, gabapentin and PRN percocet for moderate and severe pain.     CV: troponin elevated to 0.29, EKG w/o significant changes. Lactate negative. Cardiology aware - recs trend troponin. Maintain MAP > 65. Pt currently on gila and vaso. trend CVP. Mixed venous with O2 sat 51%. Unable to place A - line due to multiple bypasses and subclavian steel (L). Pt remains tachycardic, though denies any chest pain/palpitations. Stat echo ordered.    Pulm: Continue 50% venti mask, nebs PRN for COPD, encourage cough and deep breating, pulm toilet / chest PT    GI/Nutrition: NPO for now given acute change in clinical condition and high pressor requirements. Continue bowel reg with senna and Miralax    /Renal: Huynh for strict I&O, closely monitor UOP. Replete lytes as needed.    ID: continue Ancef, trend leukocytosis, monitor for fevers. Will continue to monitor for s/sx of infx - though less concerned for septic shock in the setting of low O2 venous saturation and elevated troponin. Will send blood cultures today.     Endo: continue insulin gtt for hyperglycemia    Skin: Repositioning for DTI prevention while in bed    Heme/DVT Prophylaxis: SCDs, heparing gtt - rate increased, f/u PTT in 6 hours, continue     Lines/Tubes: RIJ TLC, huynh    Dispo: DNR, care per SICU

## 2021-02-18 NOTE — PROGRESS NOTE ADULT - ASSESSMENT
A/P: 80 year old female with peripheral vascular disease s/p right femoral artery patch repair and endarterectomy with ax-fem, fem-pop PTFE bypass, admitted for cellulitis of RLE. Downgrade held due to acute decompensation, now w/ concern for possible NSTEMI and cardiogenic shock.     - f/u cardiology recs.   -Neurovascular checks per SICU team  -Continue Ancef  -Continue hep gtt  -Elevate RLE  -Prevena to stay in place in right groin at least through 2/20  -Remainder of management per SICU

## 2021-02-18 NOTE — PROGRESS NOTE ADULT - ATTENDING COMMENTS
Patient was seen on 2/18/2021 in SICU.  Patient was doing well on 2/17/2021 all day but early morning had episode of hypotension and hypoxia requiring vasopressor support and supplemental O2. EKG no acute changes except sinus tachycardia. Troponins bumped to 0.26. No chest pain. Initial impression was NSTEMI but after reviewing the case with cardiology, it seems les likely. However will continue to trend Trops. Repeat Echo today. Patient is already on Aspirin, anticoags and Metoprolol and statins. Patient has room for hydration and also would also order blood transfusion because repeat H/H 7.1.  A-a gradient  ~150, expected low 20s but clinically not dyspnoic on 40% FiO2. CXR clear no pneumothorax. Plan is to proceed with CT angio of chest although the probability is low. Family wants to hold off angio until family is here.  Patient is breathing spontaneously without distress but getting drowsy and I had discussion with her about DNR and DNI because patient has indicated choice of DNI in the past. Right she is not decided and would like to think about it.  UO is good. Cr 0.9. Continue NPO and IV fluids.  Insuline sliding scale. Synthroid. Add Hydrocortisone for stress dose.  No ID issues.  Continue Heparin gtt.

## 2021-02-18 NOTE — PROGRESS NOTE ADULT - SUBJECTIVE AND OBJECTIVE BOX
Vascular Surgery Progress Note:  Patient with acute decompensation overngiht. FOund to be having likely NSTEMI. On Phenylephrine + vasopressin. Cardiology reconsulted, recs pending.      Medications:   aspirin 325 milliGRAM(s) Oral daily  atorvastatin 80 milliGRAM(s) Oral at bedtime  ceFAZolin   IVPB 2000 milliGRAM(s) IV Intermittent every 8 hours  chlorhexidine 2% Cloths 1 Application(s) Topical <User Schedule>  cilostazol 100 milliGRAM(s) Oral every 12 hours  fenofibrate Tablet 145 milliGRAM(s) Oral daily  gabapentin 100 milliGRAM(s) Oral every 6 hours  heparin  Infusion. 1400 Unit(s)/Hr (14 mL/Hr) IV Continuous <Continuous>  insulin glargine Injectable (LANTUS) 20 Unit(s) SubCutaneous at bedtime  insulin regular Infusion 5 Unit(s)/Hr (5 mL/Hr) IV Continuous <Continuous>  levothyroxine 88 MICROGram(s) Oral daily  metoprolol tartrate 50 milliGRAM(s) Oral every 12 hours  phenylephrine    Infusion 0.5 MICROgram(s)/kG/Min (18.1 mL/Hr) IV Continuous <Continuous>  polyethylene glycol 3350 17 Gram(s) Oral daily  ranolazine 500 milliGRAM(s) Oral two times a day  senna 2 Tablet(s) Oral at bedtime  tamsulosin 0.4 milliGRAM(s) Oral at bedtime  vasopressin Infusion 0.04 Unit(s)/Min (2.4 mL/Hr) IV Continuous <Continuous>    acetaminophen   Tablet .. 975 milliGRAM(s) Oral every 6 hours PRN Mild Pain (1 - 3)  heparin   Injectable 8000 Unit(s) IV Push every 6 hours PRN For aPTT less than 40  heparin   Injectable 4000 Unit(s) IV Push every 6 hours PRN For aPTT between 40 - 57  HYDROmorphone  Injectable 0.5 milliGRAM(s) IV Push every 4 hours PRN Breakthrough pain  ondansetron Injectable 4 milliGRAM(s) IV Push every 6 hours PRN Nausea  oxyCODONE    IR 5 milliGRAM(s) Oral every 6 hours PRN Moderate Pain (4 - 6)  oxyCODONE    IR 10 milliGRAM(s) Oral every 6 hours PRN Severe Pain (7 - 10)      Objective:   T(F): 99.1 (02-18 @ 04:00), Max: 99.1 (02-17 @ 08:00)  HR: 128 (02-18 @ 05:00) (100 - 130)  BP: 101/58 (02-18 @ 05:00) (65/42 - 130/58)  BP(mean): 71 (02-18 @ 05:00) (48 - 89)  ABP: --  ABP(mean): --  RR: 23 (02-18 @ 05:00) (17 - 37)  SpO2: 98% (02-18 @ 05:00) (91% - 99%)      Physical Exam:   Constitutional: patient resting comfortably in bed, in no acute distress  HEENT: EOMI / PERRL b/l  Neck: No JVD, full ROM without pain  Respiratory: respirations are unlabored, no accessory muscle use  Cardiovascular: regular rate & rhythm  Gastrointestinal: Abdomen soft, non-tender, non-distended  Vasc: unable to palpate distal pulses. No signals of right lower extremity obtained at this time, RLE cool to touch below level of knee. Delayed capillary refill   Skin: surgical sites with dressing  in place without expanding hematoma, no strikethrough of dressing, R groin prevena in place      I&O's    02-16 @ 07:01  -  02-17 @ 07:00  --------------------------------------------------------  IN:    Heparin: 188 mL    Insulin: 62 mL    IV PiggyBack: 50 mL    multiple electrolytes Injection Type 1.: 825 mL    Oral Fluid: 390 mL  Total IN: 1515 mL    OUT:    Indwelling Catheter - Urethral (mL): 1400 mL  Total OUT: 1400 mL    Total NET: 115 mL      02-17 @ 07:01  -  02-18 @ 05:38  --------------------------------------------------------  IN:    Heparin: 230 mL    Insulin: 30 mL    IV PiggyBack: 416.5 mL    IV PiggyBack: 100 mL    multiple electrolytes Injection Type 1.: 75 mL    Phenylephrine: 131.2 mL    Sodium Chloride 0.9% Bolus: 1000 mL    Vasopressin: 7.2 mL  Total IN: 1989.9 mL    OUT:    Indwelling Catheter - Urethral (mL): 1235 mL  Total OUT: 1235 mL    Total NET: 754.9 mL          LABS:                        7.3    12.12 )-----------( 196      ( 18 Feb 2021 01:37 )             22.4     02-18    137  |  101  |  17.0  ----------------------------<  133<H>  4.0   |  26.0  |  0.90    Ca    7.8<L>      18 Feb 2021 01:37  Phos  2.5     02-18  Mg     2.0     02-18      PT/INR - ( 18 Feb 2021 01:36 )   PT: 16.3 sec;   INR: 1.43 ratio         PTT - ( 18 Feb 2021 01:36 )  PTT:53.5 sec      MICROBIOLOGY:       PATHOLOGY:

## 2021-02-18 NOTE — PROGRESS NOTE ADULT - SUBJECTIVE AND OBJECTIVE BOX
the patient is a 80y Female with past medical history significant for CAD, HTN, DM, PAD, L subclavian stenosis, mild-moderate AS.    She was recently hospitalized for cellulitis and now presents with recurrent cellulitis and found to have critical limb ischemia.  She is s/p axillo-bifemoral and femoropopliteal bypass and right SFA and femoral endarterectomy with patch repair.    She acutely decompensated overnight, requiring vasopressor support with phenylephrine and vasopressin.  She is now hemodynamically unstable, with worsening hypoxic respiratory failure and troponin elevation.    NKDA.    pt is currently on a heparin infusion.   EF is 50% with mild to moderate aortic stenosis.    H/H .7.1/21  K- 4.0  troponin is 0.3    pt is planned to have a right leg embolectomy , possible angiogram for limb salvage on 2/19, however at the present time  she is not optimzed fot the operating room. An NSTEMI and cardiogenic shock is likely. Pt is severely anemic and should be transfused PRBCS. ABG showed a base excess of 5      ASA 4    Dr. Zhao Rossi

## 2021-02-19 NOTE — PROGRESS NOTE ADULT - ASSESSMENT
80 year old female with peripheral vascular disease s/p right femoral artery patch repair and endarterectomy with ax-fem, fem-pop PTFE bypass, admitted for cellulitis of RLE. Downgrade held due to acute decompensation, now w/ concern for possible NSTEMI and cardiogenic shock.     - f/u cardiology recs.   -Neurovascular checks per SICU team  -Continue Ancef  -Continue hep gtt  -Elevate RLE  -Prevena to stay in place in right groin at least through 2/20  -Remainder of management per SICU  - Surgery cancelled as anesthesia requires additional optimization given patient's high risk for poor outcome for surgical procedure

## 2021-02-19 NOTE — PROGRESS NOTE ADULT - SUBJECTIVE AND OBJECTIVE BOX
[facility] - [facility]                                   [address] [place] [address]                                                      PHONE: [phone]                                                         FAX: [phone]  [email or web address]  ---------------------------------------------------------------------------------------------------------------------------------    Overnight events/patient complaints: events noted ? NSTEMI howeevr no acute ECG changes or normal EF on echo, asx without angina      No Known Allergies    MEDICATIONS  (STANDING):  aspirin 325 milliGRAM(s) Oral daily  atorvastatin 80 milliGRAM(s) Oral at bedtime  ceFAZolin   IVPB 2000 milliGRAM(s) IV Intermittent every 8 hours  chlorhexidine 2% Cloths 1 Application(s) Topical <User Schedule>  dextrose 40% Gel 15 Gram(s) Oral once  dextrose 5%. 1000 milliLiter(s) (50 mL/Hr) IV Continuous <Continuous>  dextrose 5%. 1000 milliLiter(s) (100 mL/Hr) IV Continuous <Continuous>  dextrose 50% Injectable 25 Gram(s) IV Push once  dextrose 50% Injectable 12.5 Gram(s) IV Push once  dextrose 50% Injectable 25 Gram(s) IV Push once  fenofibrate Tablet 145 milliGRAM(s) Oral daily  glucagon  Injectable 1 milliGRAM(s) IntraMuscular once  heparin  Infusion. 1400 Unit(s)/Hr (14 mL/Hr) IV Continuous <Continuous>  insulin glargine Injectable (LANTUS) 22 Unit(s) SubCutaneous at bedtime  insulin lispro (ADMELOG) corrective regimen sliding scale   SubCutaneous three times a day before meals  levothyroxine 88 MICROGram(s) Oral daily  metoprolol tartrate 25 milliGRAM(s) Oral two times a day  multiple electrolytes Injection Type 1 1000 milliLiter(s) (75 mL/Hr) IV Continuous <Continuous>  norepinephrine Infusion 0.05 MICROgram(s)/kG/Min (9.05 mL/Hr) IV Continuous <Continuous>  polyethylene glycol 3350 17 Gram(s) Oral daily  senna 2 Tablet(s) Oral at bedtime  tamsulosin 0.4 milliGRAM(s) Oral at bedtime    MEDICATIONS  (PRN):  acetaminophen   Tablet .. 975 milliGRAM(s) Oral every 6 hours PRN Mild Pain (1 - 3)  heparin   Injectable 8000 Unit(s) IV Push every 6 hours PRN For aPTT less than 40  heparin   Injectable 4000 Unit(s) IV Push every 6 hours PRN For aPTT between 40 - 57  HYDROmorphone  Injectable 0.5 milliGRAM(s) IV Push every 4 hours PRN Breakthrough pain  ondansetron Injectable 4 milliGRAM(s) IV Push every 6 hours PRN Nausea  oxyCODONE    IR 5 milliGRAM(s) Oral every 6 hours PRN Moderate Pain (4 - 6)  oxyCODONE    IR 10 milliGRAM(s) Oral every 6 hours PRN Severe Pain (7 - 10)      Vital Signs Last 24 Hrs  T(C): 36.4 (19 Feb 2021 08:08), Max: 36.9 (18 Feb 2021 11:49)  T(F): 97.6 (19 Feb 2021 08:08), Max: 98.4 (18 Feb 2021 11:49)  HR: 104 (19 Feb 2021 09:00) (98 - 118)  BP: --  BP(mean): --  RR: 20 (19 Feb 2021 09:00) (15 - 35)  SpO2: 95% (19 Feb 2021 09:00) (92% - 99%)  Daily     Daily   ICU Vital Signs Last 24 Hrs  JAMAAL HERR  I&O's Detail    18 Feb 2021 07:01  -  19 Feb 2021 07:00  --------------------------------------------------------  IN:    Heparin Infusion: 324 mL    Insulin: 3 mL    IV PiggyBack: 50 mL    multiple electrolytes Injection Type 1.: 978 mL    Norepinephrine: 90.5 mL    Phenylephrine: 220 mL    PRBCs (Packed Red Blood Cells): 600 mL    Vasopressin: 9.6 mL  Total IN: 2275.1 mL    OUT:    Indwelling Catheter - Urethral (mL): 995 mL  Total OUT: 995 mL    Total NET: 1280.1 mL      19 Feb 2021 07:01  -  19 Feb 2021 09:23  --------------------------------------------------------  IN:    Heparin Infusion: 42 mL    multiple electrolytes Injection Type 1.: 225 mL    Oral Fluid: 120 mL  Total IN: 387 mL    OUT:    Indwelling Catheter - Urethral (mL): 100 mL    Norepinephrine: 0 mL  Total OUT: 100 mL    Total NET: 287 mL        I&O's Summary    18 Feb 2021 07:01  -  19 Feb 2021 07:00  --------------------------------------------------------  IN: 2275.1 mL / OUT: 995 mL / NET: 1280.1 mL    19 Feb 2021 07:01  -  19 Feb 2021 09:23  --------------------------------------------------------  IN: 387 mL / OUT: 100 mL / NET: 287 mL      Drug Dosing Weight  JAMAAL HERR      PHYSICAL EXAM:  General: Appears well developed, well nourished alert and cooperative.  HEENT: Head; normocephalic, atraumatic.  Eyes: Pupils reactive, cornea wnl.  Neck: Supple, no nodes adenopathy, no NVD or carotid bruit or thyromegaly.  CARDIOVASCULAR: Normal S1 and S2, No murmur, rub, gallop or lift.   LUNGS: No rales, rhonchi or wheeze. Normal breath sounds bilaterally.  ABDOMEN: Soft, nontender without mass or organomegaly. bowel sounds normoactive.  EXTREMITIES: No clubbing, cyanosis or edema. Distal pulses wnl.   SKIN: warm and dry with normal turgor.  NEURO: Alert/oriented x 3/normal motor exam. No pathologic reflexes.    PSYCH: normal affect.        LABS:                        8.6    11.68 )-----------( 222      ( 19 Feb 2021 05:24 )             26.1     02-19    135  |  99  |  23.0<H>  ----------------------------<  196<H>  4.6   |  24.0  |  0.87    Ca    7.5<L>      19 Feb 2021 05:24  Phos  2.9     02-19  Mg     2.3     02-19    TPro  5.1<L>  /  Alb  2.2<L>  /  TBili  0.5  /  DBili  x   /  AST  324<H>  /  ALT  121<H>  /  AlkPhos  56  02-18    JAMAAL HERR  CARDIAC MARKERS ( 18 Feb 2021 22:02 )  x     / 0.27 ng/mL / x     / x     / x      CARDIAC MARKERS ( 18 Feb 2021 15:41 )  x     / 0.35 ng/mL / 7333 U/L / x     / 39.0 ng/mL  CARDIAC MARKERS ( 18 Feb 2021 08:28 )  x     / 0.29 ng/mL / 7352 U/L / x     / 27.2 ng/mL  CARDIAC MARKERS ( 18 Feb 2021 01:37 )  x     / 0.26 ng/mL / x     / x     / x          PT/INR - ( 18 Feb 2021 01:36 )   PT: 16.3 sec;   INR: 1.43 ratio         PTT - ( 19 Feb 2021 05:24 )  PTT:67.9 sec      RADIOLOGY & ADDITIONAL STUDIES:    INTERPRETATION OF TELEMETRY (personally reviewed):    ECG: NSR inc LBBB    ECHO:< from: TTE Echo Complete w/ Contrast w/ Doppler (02.16.21 @ 11:56) >  Summary:   1. Technically difficult study with poor endocardial visualization requiring use of IV echo contrast to enhance endocardial visualization.   2. Normal global left ventricular systolic function.   3. Left ventricular ejection fraction, by visual estimation, is 50 to 55%.   4. Mildly increased LV wall thickness.   5. Normal left ventricular internal cavity size.   6. Abnormal septal motion which may be due to conduction delay.   7. Mild thickening and calcification of the anterior and posterior mitral valve leaflets.   8. Moderate mitral annular calcification.   9. The pericardium was not well visualized.  10. The aortic valve is not well visualized, appears to have calcified leaflets with decreased opening.  11. Recommend clinical correlation with the above findings.    Shani Naik MD Electronically signed on 2/16/2021 at 2:11:14 PM          < end of copied text >

## 2021-02-19 NOTE — CONSULT NOTE ADULT - CONSULT REQUESTED DATE/TIME
08-Feb-2021 15:54
12-Feb-2021 11:21
11-Feb-2021 01:35
19-Feb-2021 07:47
10-Feb-2021 13:43
09-Feb-2021 18:00
16-Feb-2021 01:30

## 2021-02-19 NOTE — CONSULT NOTE ADULT - ASSESSMENT
80F with DM, CAD, admitted with recurrent cellultis, found to have severe PVD, s/p right femoral artery endarterectomy, ax-fem, fem pop bypass, with thrombosed right subclavian graft, acute cardiogenic shock, and likely NSTEMI.     #1 RLE pain - managed on current regimen with neurontin and PO oxycodone, post operatively will likely need maintenance meds as well.   #2  80F with DM, CAD, admitted with recurrent cellultis, found to have severe PVD, s/p right femoral artery endarterectomy, ax-fem, fem pop bypass, with thrombosed right subclavian graft, acute cardiogenic shock, and likely NSTEMI.     #1 RLE pain - managed on current regimen with neurontin and PO oxycodone, post operatively will likely need maintenance meds as well.   #2 Shock - possible cardiac - wean pressors as tolerated. normal echo. cardio following.   #3 thrombosed graft - possible thrombectomy if continues to be medically stable.   #4 Palliative care encounter - patient is alert and oriented and understands her medical condition. She is okay with whatever procedure must be done if it is life saving. She would like to continue all medical therapeis that can help her get back some kind of meaningful quality of life. She is even okay if they need to amputate. Prior baseline is with a rollator and walker but she has alot of help at home. She realizes that she is very sick and that things may not go the way we want them to, and if she decompensates to a point where she will not have meaningful interactions or quality of life then she would want optimal pain relief and relief of suffering. Will continue to follow along.

## 2021-02-19 NOTE — CONSULT NOTE ADULT - CONSULT REASON
Left Knee swelling
"80yF severe PVD, Chronic RCA occlusion now with worsening resp distress and pressor requirements."
CAD, PAD, Pre-op cardiovascular evaluation
ICU monitoring and resuscitation
Cellulitis
PVD
R foot pain

## 2021-02-19 NOTE — PROGRESS NOTE ADULT - SUBJECTIVE AND OBJECTIVE BOX
INTERVAL HPI/OVERNIGHT EVENTS:    Patient not optimized for surgery per anesthesia and received additional transfusions. CTA ruled out PE, but with complete occlusion of axillary bypass. In setting of patient's recent NSTEMI, patient requires further care prior to surgical intervention.       MEDICATIONS  (STANDING):  aspirin 325 milliGRAM(s) Oral daily  atorvastatin 80 milliGRAM(s) Oral at bedtime  ceFAZolin   IVPB 2000 milliGRAM(s) IV Intermittent every 8 hours  chlorhexidine 2% Cloths 1 Application(s) Topical <User Schedule>  dextrose 40% Gel 15 Gram(s) Oral once  dextrose 5%. 1000 milliLiter(s) (50 mL/Hr) IV Continuous <Continuous>  dextrose 5%. 1000 milliLiter(s) (100 mL/Hr) IV Continuous <Continuous>  dextrose 50% Injectable 25 Gram(s) IV Push once  dextrose 50% Injectable 12.5 Gram(s) IV Push once  dextrose 50% Injectable 25 Gram(s) IV Push once  fenofibrate Tablet 145 milliGRAM(s) Oral daily  glucagon  Injectable 1 milliGRAM(s) IntraMuscular once  heparin  Infusion. 1400 Unit(s)/Hr (14 mL/Hr) IV Continuous <Continuous>  hydrocortisone sodium succinate Injectable 50 milliGRAM(s) IV Push every 8 hours  insulin glargine Injectable (LANTUS) 30 Unit(s) SubCutaneous at bedtime  insulin lispro (ADMELOG) corrective regimen sliding scale   SubCutaneous three times a day before meals  levothyroxine 88 MICROGram(s) Oral daily  metoprolol tartrate Injectable 5 milliGRAM(s) IV Push every 6 hours  multiple electrolytes Injection Type 1 1000 milliLiter(s) (75 mL/Hr) IV Continuous <Continuous>  norepinephrine Infusion 0.05 MICROgram(s)/kG/Min (9.05 mL/Hr) IV Continuous <Continuous>  polyethylene glycol 3350 17 Gram(s) Oral daily  senna 2 Tablet(s) Oral at bedtime  tamsulosin 0.4 milliGRAM(s) Oral at bedtime    MEDICATIONS  (PRN):  acetaminophen   Tablet .. 975 milliGRAM(s) Oral every 6 hours PRN Mild Pain (1 - 3)  heparin   Injectable 8000 Unit(s) IV Push every 6 hours PRN For aPTT less than 40  heparin   Injectable 4000 Unit(s) IV Push every 6 hours PRN For aPTT between 40 - 57  HYDROmorphone  Injectable 0.5 milliGRAM(s) IV Push every 4 hours PRN Breakthrough pain  ondansetron Injectable 4 milliGRAM(s) IV Push every 6 hours PRN Nausea  oxyCODONE    IR 5 milliGRAM(s) Oral every 6 hours PRN Moderate Pain (4 - 6)  oxyCODONE    IR 10 milliGRAM(s) Oral every 6 hours PRN Severe Pain (7 - 10)      Vital Signs Last 24 Hrs  T(C): 36.4 (18 Feb 2021 16:04), Max: 37.3 (18 Feb 2021 04:00)  T(F): 97.5 (18 Feb 2021 16:04), Max: 99.1 (18 Feb 2021 04:00)  HR: 115 (19 Feb 2021 00:00) (98 - 130)  BP: 97/53 (18 Feb 2021 08:00) (65/42 - 124/75)  BP(mean): 67 (18 Feb 2021 08:00) (51 - 89)  RR: 30 (19 Feb 2021 00:00) (15 - 44)  SpO2: 96% (19 Feb 2021 00:00) (93% - 99%)    Physical Exam:    Physical Exam:   Constitutional: patient resting comfortably in bed, in no acute distress  HEENT: EOMI / PERRL b/l  Neck: No JVD, full ROM without pain  Respiratory: respirations are unlabored, no accessory muscle use  Cardiovascular: regular rate & rhythm  Gastrointestinal: Abdomen soft, non-tender, non-distended  Vasc: unable to palpate distal pulses. No signals of right lower extremity obtained at this time, RLE cool to touch below level of knee. Delayed capillary refill   Skin: surgical sites with dressing  in place without expanding hematoma, no strikethrough of dressing, R groin prevena in place        I&O's Detail    17 Feb 2021 07:01  -  18 Feb 2021 07:00  --------------------------------------------------------  IN:    Heparin: 230 mL    Heparin Infusion: 70 mL    Insulin: 55 mL    IV PiggyBack: 150 mL    IV PiggyBack: 416.5 mL    multiple electrolytes Injection Type 1 Bolus: 2000 mL    multiple electrolytes Injection Type 1.: 75 mL    Phenylephrine: 243.3 mL    Vasopressin: 12 mL  Total IN: 3251.8 mL    OUT:    Indwelling Catheter - Urethral (mL): 1325 mL  Total OUT: 1325 mL    Total NET: 1926.8 mL      18 Feb 2021 07:01  -  19 Feb 2021 02:06  --------------------------------------------------------  IN:    Heparin Infusion: 240 mL    Insulin: 3 mL    IV PiggyBack: 50 mL    multiple electrolytes Injection Type 1.: 525 mL    Norepinephrine: 90.5 mL    Phenylephrine: 220 mL    PRBCs (Packed Red Blood Cells): 600 mL    Vasopressin: 9.6 mL  Total IN: 1738.1 mL    OUT:    Indwelling Catheter - Urethral (mL): 755 mL  Total OUT: 755 mL    Total NET: 983.1 mL          LABS:                        9.0    12.57 )-----------( 188      ( 18 Feb 2021 15:41 )             27.5     02-18    133<L>  |  98  |  21.0<H>  ----------------------------<  182<H>  4.4   |  22.0  |  0.82    Ca    7.4<L>      18 Feb 2021 23:07  Phos  2.9     02-18  Mg     2.1     02-18    TPro  5.1<L>  /  Alb  2.2<L>  /  TBili  0.5  /  DBili  x   /  AST  324<H>  /  ALT  121<H>  /  AlkPhos  56  02-18    PT/INR - ( 18 Feb 2021 01:36 )   PT: 16.3 sec;   INR: 1.43 ratio         PTT - ( 18 Feb 2021 22:02 )  PTT:55.4 sec      RADIOLOGY & ADDITIONAL STUDIES:

## 2021-02-19 NOTE — CONSULT NOTE ADULT - SUBJECTIVE AND OBJECTIVE BOX
HPI:    PERTINENT PMH REVIEWED: Yes No    PAST MEDICAL & SURGICAL HISTORY:  PVD (peripheral vascular disease)    DM (diabetes mellitus)    Type 2 diabetes mellitus with other specified complication, unspecified whether long term insulin use    Hyperlipidemia, unspecified hyperlipidemia type    Coronary artery disease, angina presence unspecified, unspecified vessel or lesion type, unspecified whether native or transplanted heart    Pulmonary emphysema, unspecified emphysema type    No significant past surgical history        SOCIAL HISTORY:                                     Admitted from:  home  SNF  [facility]     Surrogate/HCP/Guardian: Phone#:    FAMILY HISTORY:  Family history of liver cancer (Mother)    Family history of stomach cancer (Father)        Baseline ADLs (prior to admission):  Independent/ Dependent      Allergies    No Known Allergies    Intolerances        Present Symptoms:     Dyspnea: 0 1 2 3   Nausea/Vomiting: Yes No  Anxiety:  Yes No  Depression: Yes No  Fatigue: Yes No  Loss of appetite: Yes No    Pain:             Character-            Duration-            Effect-            Factors-            Frequency-            Location-            Severity-    Review of Systems: Reviewed                     Negative:                     Positive:  Unable to obtain due to poor mentation   All others negative    MEDICATIONS  (STANDING):  aspirin 325 milliGRAM(s) Oral daily  atorvastatin 80 milliGRAM(s) Oral at bedtime  ceFAZolin   IVPB 2000 milliGRAM(s) IV Intermittent every 8 hours  chlorhexidine 2% Cloths 1 Application(s) Topical <User Schedule>  dextrose 40% Gel 15 Gram(s) Oral once  dextrose 5%. 1000 milliLiter(s) (50 mL/Hr) IV Continuous <Continuous>  dextrose 5%. 1000 milliLiter(s) (100 mL/Hr) IV Continuous <Continuous>  dextrose 50% Injectable 25 Gram(s) IV Push once  dextrose 50% Injectable 12.5 Gram(s) IV Push once  dextrose 50% Injectable 25 Gram(s) IV Push once  fenofibrate Tablet 145 milliGRAM(s) Oral daily  glucagon  Injectable 1 milliGRAM(s) IntraMuscular once  heparin  Infusion. 1400 Unit(s)/Hr (14 mL/Hr) IV Continuous <Continuous>  hydrocortisone sodium succinate Injectable 50 milliGRAM(s) IV Push every 8 hours  insulin glargine Injectable (LANTUS) 30 Unit(s) SubCutaneous at bedtime  insulin lispro (ADMELOG) corrective regimen sliding scale   SubCutaneous three times a day before meals  levothyroxine 88 MICROGram(s) Oral daily  metoprolol tartrate Injectable 5 milliGRAM(s) IV Push every 6 hours  multiple electrolytes Injection Type 1 1000 milliLiter(s) (75 mL/Hr) IV Continuous <Continuous>  norepinephrine Infusion 0.05 MICROgram(s)/kG/Min (9.05 mL/Hr) IV Continuous <Continuous>  polyethylene glycol 3350 17 Gram(s) Oral daily  senna 2 Tablet(s) Oral at bedtime  tamsulosin 0.4 milliGRAM(s) Oral at bedtime    MEDICATIONS  (PRN):  acetaminophen   Tablet .. 975 milliGRAM(s) Oral every 6 hours PRN Mild Pain (1 - 3)  heparin   Injectable 8000 Unit(s) IV Push every 6 hours PRN For aPTT less than 40  heparin   Injectable 4000 Unit(s) IV Push every 6 hours PRN For aPTT between 40 - 57  HYDROmorphone  Injectable 0.5 milliGRAM(s) IV Push every 4 hours PRN Breakthrough pain  ondansetron Injectable 4 milliGRAM(s) IV Push every 6 hours PRN Nausea  oxyCODONE    IR 5 milliGRAM(s) Oral every 6 hours PRN Moderate Pain (4 - 6)  oxyCODONE    IR 10 milliGRAM(s) Oral every 6 hours PRN Severe Pain (7 - 10)      PHYSICAL EXAM:    Vital Signs Last 24 Hrs  T(C): 36.4 (18 Feb 2021 16:04), Max: 36.9 (18 Feb 2021 11:49)  T(F): 97.5 (18 Feb 2021 16:04), Max: 98.4 (18 Feb 2021 11:49)  HR: 98 (19 Feb 2021 07:00) (98 - 121)  BP: 97/53 (18 Feb 2021 08:00) (97/53 - 97/53)  BP(mean): 67 (18 Feb 2021 08:00) (67 - 67)  RR: 15 (19 Feb 2021 07:00) (15 - 35)  SpO2: 96% (19 Feb 2021 07:00) (92% - 99%)    General: alert  oriented x ____ lethargic agitated                  cachexia  nonverbal  coma    Karnofsky:  %    HEENT: normal  dry mouth  ET tube/trach    Lungs: comfortable tachypnea/labored breathing  excessive secretions    CV: normal  tachycardia    GI: normal  distended  tender  no BS               PEG/NG/OG tube  constipation  last BM:     : normal  incontinent  oliguria/anuria  huynh    MSK: normal  weakness  edema             ambulatory  bedbound/wheelchair bound    Skin: normal  pressure ulcers- Stage_____  no rash    LABS:                        8.6    11.68 )-----------( 222      ( 19 Feb 2021 05:24 )             26.1     02-19    135  |  99  |  23.0<H>  ----------------------------<  196<H>  4.6   |  24.0  |  0.87    Ca    7.5<L>      19 Feb 2021 05:24  Phos  2.9     02-19  Mg     2.3     02-19    TPro  5.1<L>  /  Alb  2.2<L>  /  TBili  0.5  /  DBili  x   /  AST  324<H>  /  ALT  121<H>  /  AlkPhos  56  02-18    PT/INR - ( 18 Feb 2021 01:36 )   PT: 16.3 sec;   INR: 1.43 ratio         PTT - ( 19 Feb 2021 05:24 )  PTT:67.9 sec    I&O's Summary    18 Feb 2021 07:01  -  19 Feb 2021 07:00  --------------------------------------------------------  IN: 2275.1 mL / OUT: 995 mL / NET: 1280.1 mL        RADIOLOGY & ADDITIONAL STUDIES:    ADVANCE DIRECTIVES:   DNR YES NO  Completed on:                     PIETRO  YES NO   Completed on:  Living Will  YES NO   Completed on:       HPI: 80F with PMH as listed admitted 2/7 with recurrent cellulitis. Concerns for PVD, vascular surgery consulted, ABIs confirmed severe PVD. Went to OR 2/15 found to have occluded R CFA, proximal SFA s/p fem pop bypass/endarterectomy. Post op, transferred to SICU for shock requiring vasopressors and ventilator dependence. Extubated 2/16. CTA showing no PE but with right subclavian graft completely thrombosed. Course further complicated by likely NSTEMI, worsening respiratory status, and shock. ? Planned for OR today for amputation.     PERTINENT PMH REVIEWED: Yes     PAST MEDICAL & SURGICAL HISTORY:  PVD (peripheral vascular disease)  DM (diabetes mellitus)  Type 2 diabetes mellitus with other specified complication, unspecified whether long term insulin use  Hyperlipidemia, unspecified hyperlipidemia type  Coronary artery disease, angina presence unspecified, unspecified vessel or lesion type, unspecified whether native or transplanted heart  Pulmonary emphysema, unspecified emphysema type  No significant past surgical history    SOCIAL HISTORY:                                     Admitted from:  home      Surrogate -     FAMILY HISTORY:  Family history of liver cancer (Mother)    Family history of stomach cancer (Father)        Baseline ADLs (prior to admission):  Independent/ Dependent      Allergies    No Known Allergies    Intolerances        Present Symptoms:     Dyspnea: 0 1 2 3   Nausea/Vomiting: Yes No  Anxiety:  Yes No  Depression: Yes No  Fatigue: Yes No  Loss of appetite: Yes No    Pain:             Character-            Duration-            Effect-            Factors-            Frequency-            Location-            Severity-    Review of Systems: Reviewed                     Negative:                     Positive:  Unable to obtain due to poor mentation   All others negative    MEDICATIONS  (STANDING):  aspirin 325 milliGRAM(s) Oral daily  atorvastatin 80 milliGRAM(s) Oral at bedtime  ceFAZolin   IVPB 2000 milliGRAM(s) IV Intermittent every 8 hours  chlorhexidine 2% Cloths 1 Application(s) Topical <User Schedule>  dextrose 40% Gel 15 Gram(s) Oral once  dextrose 5%. 1000 milliLiter(s) (50 mL/Hr) IV Continuous <Continuous>  dextrose 5%. 1000 milliLiter(s) (100 mL/Hr) IV Continuous <Continuous>  dextrose 50% Injectable 25 Gram(s) IV Push once  dextrose 50% Injectable 12.5 Gram(s) IV Push once  dextrose 50% Injectable 25 Gram(s) IV Push once  fenofibrate Tablet 145 milliGRAM(s) Oral daily  glucagon  Injectable 1 milliGRAM(s) IntraMuscular once  heparin  Infusion. 1400 Unit(s)/Hr (14 mL/Hr) IV Continuous <Continuous>  hydrocortisone sodium succinate Injectable 50 milliGRAM(s) IV Push every 8 hours  insulin glargine Injectable (LANTUS) 30 Unit(s) SubCutaneous at bedtime  insulin lispro (ADMELOG) corrective regimen sliding scale   SubCutaneous three times a day before meals  levothyroxine 88 MICROGram(s) Oral daily  metoprolol tartrate Injectable 5 milliGRAM(s) IV Push every 6 hours  multiple electrolytes Injection Type 1 1000 milliLiter(s) (75 mL/Hr) IV Continuous <Continuous>  norepinephrine Infusion 0.05 MICROgram(s)/kG/Min (9.05 mL/Hr) IV Continuous <Continuous>  polyethylene glycol 3350 17 Gram(s) Oral daily  senna 2 Tablet(s) Oral at bedtime  tamsulosin 0.4 milliGRAM(s) Oral at bedtime    MEDICATIONS  (PRN):  acetaminophen   Tablet .. 975 milliGRAM(s) Oral every 6 hours PRN Mild Pain (1 - 3)  heparin   Injectable 8000 Unit(s) IV Push every 6 hours PRN For aPTT less than 40  heparin   Injectable 4000 Unit(s) IV Push every 6 hours PRN For aPTT between 40 - 57  HYDROmorphone  Injectable 0.5 milliGRAM(s) IV Push every 4 hours PRN Breakthrough pain  ondansetron Injectable 4 milliGRAM(s) IV Push every 6 hours PRN Nausea  oxyCODONE    IR 5 milliGRAM(s) Oral every 6 hours PRN Moderate Pain (4 - 6)  oxyCODONE    IR 10 milliGRAM(s) Oral every 6 hours PRN Severe Pain (7 - 10)      PHYSICAL EXAM:    Vital Signs Last 24 Hrs  T(C): 36.4 (18 Feb 2021 16:04), Max: 36.9 (18 Feb 2021 11:49)  T(F): 97.5 (18 Feb 2021 16:04), Max: 98.4 (18 Feb 2021 11:49)  HR: 98 (19 Feb 2021 07:00) (98 - 121)  BP: 97/53 (18 Feb 2021 08:00) (97/53 - 97/53)  BP(mean): 67 (18 Feb 2021 08:00) (67 - 67)  RR: 15 (19 Feb 2021 07:00) (15 - 35)  SpO2: 96% (19 Feb 2021 07:00) (92% - 99%)    General: alert  oriented x ____ lethargic agitated                  cachexia  nonverbal  coma    Karnofsky:  %    HEENT: normal  dry mouth  ET tube/trach    Lungs: comfortable tachypnea/labored breathing  excessive secretions    CV: normal  tachycardia    GI: normal  distended  tender  no BS               PEG/NG/OG tube  constipation  last BM:     : normal  incontinent  oliguria/anuria  huynh    MSK: normal  weakness  edema             ambulatory  bedbound/wheelchair bound    Skin: normal  pressure ulcers- Stage_____  no rash    LABS:                        8.6    11.68 )-----------( 222      ( 19 Feb 2021 05:24 )             26.1     02-19    135  |  99  |  23.0<H>  ----------------------------<  196<H>  4.6   |  24.0  |  0.87    Ca    7.5<L>      19 Feb 2021 05:24  Phos  2.9     02-19  Mg     2.3     02-19    TPro  5.1<L>  /  Alb  2.2<L>  /  TBili  0.5  /  DBili  x   /  AST  324<H>  /  ALT  121<H>  /  AlkPhos  56  02-18    PT/INR - ( 18 Feb 2021 01:36 )   PT: 16.3 sec;   INR: 1.43 ratio         PTT - ( 19 Feb 2021 05:24 )  PTT:67.9 sec    I&O's Summary    18 Feb 2021 07:01  -  19 Feb 2021 07:00  --------------------------------------------------------  IN: 2275.1 mL / OUT: 995 mL / NET: 1280.1 mL        RADIOLOGY & ADDITIONAL STUDIES:    ADVANCE DIRECTIVES:   DNR YES NO  Completed on:                     MOLST  YES NO   Completed on:  Living Will  YES NO   Completed on:       HPI: 80F with PMH as listed admitted 2/7 with recurrent cellulitis. Concerns for PVD, vascular surgery consulted, ABIs confirmed severe PVD. Went to OR 2/15 found to have occluded R CFA, proximal SFA s/p fem pop bypass/endarterectomy. Post op, transferred to SICU for shock requiring vasopressors and ventilator dependence. Extubated 2/16. CTA showing no PE but with right subclavian graft completely thrombosed. Course further complicated by likely NSTEMI, worsening respiratory status, and shock. much more alert today.     PERTINENT PMH REVIEWED: Yes     PAST MEDICAL & SURGICAL HISTORY:  PVD (peripheral vascular disease)  DM (diabetes mellitus)  Type 2 diabetes mellitus with other specified complication, unspecified whether long term insulin use  Hyperlipidemia, unspecified hyperlipidemia type  Coronary artery disease, angina presence unspecified, unspecified vessel or lesion type, unspecified whether native or transplanted heart  Pulmonary emphysema, unspecified emphysema type  No significant past surgical history    SOCIAL HISTORY:                                     Admitted from:  home      Surrogate - two daughters Rachael and Renetta (one here and one in [facility] - spouse has dementia and recent neurosurgery.     FAMILY HISTORY:  Family history of liver cancer (Mother)    Family history of stomach cancer (Father)    Baseline ADLs (prior to admission):  Dependent  - rollator and walker     Allergies    No Known Allergies    Present Symptoms:     Dyspnea: 0   Nausea/Vomiting: No  Anxiety:  No  Depression: No  Fatigue: Yes   Loss of appetite: No    Pain: right foot shock like and pressure like - relieved with medications             Character-            Duration-            Effect-            Factors-            Frequency-            Location-            Severity-    Review of Systems: Reviewed                     Negative: no shortness of breath              All others negative    MEDICATIONS  (STANDING):  aspirin 325 milliGRAM(s) Oral daily  atorvastatin 80 milliGRAM(s) Oral at bedtime  ceFAZolin   IVPB 2000 milliGRAM(s) IV Intermittent every 8 hours  chlorhexidine 2% Cloths 1 Application(s) Topical <User Schedule>  dextrose 40% Gel 15 Gram(s) Oral once  dextrose 5%. 1000 milliLiter(s) (50 mL/Hr) IV Continuous <Continuous>  dextrose 5%. 1000 milliLiter(s) (100 mL/Hr) IV Continuous <Continuous>  dextrose 50% Injectable 25 Gram(s) IV Push once  dextrose 50% Injectable 12.5 Gram(s) IV Push once  dextrose 50% Injectable 25 Gram(s) IV Push once  fenofibrate Tablet 145 milliGRAM(s) Oral daily  glucagon  Injectable 1 milliGRAM(s) IntraMuscular once  heparin  Infusion. 1400 Unit(s)/Hr (14 mL/Hr) IV Continuous <Continuous>  hydrocortisone sodium succinate Injectable 50 milliGRAM(s) IV Push every 8 hours  insulin glargine Injectable (LANTUS) 30 Unit(s) SubCutaneous at bedtime  insulin lispro (ADMELOG) corrective regimen sliding scale   SubCutaneous three times a day before meals  levothyroxine 88 MICROGram(s) Oral daily  metoprolol tartrate Injectable 5 milliGRAM(s) IV Push every 6 hours  multiple electrolytes Injection Type 1 1000 milliLiter(s) (75 mL/Hr) IV Continuous <Continuous>  norepinephrine Infusion 0.05 MICROgram(s)/kG/Min (9.05 mL/Hr) IV Continuous <Continuous>  polyethylene glycol 3350 17 Gram(s) Oral daily  senna 2 Tablet(s) Oral at bedtime  tamsulosin 0.4 milliGRAM(s) Oral at bedtime    MEDICATIONS  (PRN):  acetaminophen   Tablet .. 975 milliGRAM(s) Oral every 6 hours PRN Mild Pain (1 - 3)  heparin   Injectable 8000 Unit(s) IV Push every 6 hours PRN For aPTT less than 40  heparin   Injectable 4000 Unit(s) IV Push every 6 hours PRN For aPTT between 40 - 57  HYDROmorphone  Injectable 0.5 milliGRAM(s) IV Push every 4 hours PRN Breakthrough pain  ondansetron Injectable 4 milliGRAM(s) IV Push every 6 hours PRN Nausea  oxyCODONE    IR 5 milliGRAM(s) Oral every 6 hours PRN Moderate Pain (4 - 6)  oxyCODONE    IR 10 milliGRAM(s) Oral every 6 hours PRN Severe Pain (7 - 10)      PHYSICAL EXAM:    Vital Signs Last 24 Hrs  T(C): 36.4 (18 Feb 2021 16:04), Max: 36.9 (18 Feb 2021 11:49)  T(F): 97.5 (18 Feb 2021 16:04), Max: 98.4 (18 Feb 2021 11:49)  HR: 98 (19 Feb 2021 07:00) (98 - 121)  BP: 97/53 (18 Feb 2021 08:00) (97/53 - 97/53)  BP(mean): 67 (18 Feb 2021 08:00) (67 - 67)  RR: 15 (19 Feb 2021 07:00) (15 - 35)  SpO2: 96% (19 Feb 2021 07:00) (92% - 99%)    General: alert and oriented x 4     Karnofsky:  40 %    HEENT: normal      Lungs: comfortable     CV: normal      GI: normal      : lino    MSK: bedbound    Skin: no rash    LABS:                      8.6    11.68 )-----------( 222      ( 19 Feb 2021 05:24 )             26.1     02-19    135  |  99  |  23.0<H>  ----------------------------<  196<H>  4.6   |  24.0  |  0.87    Ca    7.5<L>      19 Feb 2021 05:24  Phos  2.9     02-19  Mg     2.3     02-19    TPro  5.1<L>  /  Alb  2.2<L>  /  TBili  0.5  /  DBili  x   /  AST  324<H>  /  ALT  121<H>  /  AlkPhos  56  02-18    PT/INR - ( 18 Feb 2021 01:36 )   PT: 16.3 sec;   INR: 1.43 ratio       PTT - ( 19 Feb 2021 05:24 )  PTT:67.9 sec    I&O's Summary    18 Feb 2021 07:01  -  19 Feb 2021 07:00  --------------------------------------------------------  IN: 2275.1 mL / OUT: 995 mL / NET: 1280.1 mL    RADIOLOGY & ADDITIONAL STUDIES:    < from: CT Angio Chest w/ IV Cont (02.18.21 @ 18:14) >  IMPRESSION:    No pulmonary embolism.    There is a partially imaged right subclavian vascular graft which is completely thrombosed.    < end of copied text >    < from: Xray Chest 1 View- PORTABLE-Urgent (Xray Chest 1 View- PORTABLE-Urgent .) (02.18.21 @ 08:00) >    IMPRESSION:    LEFT infrahilar/ retrocardiac airspace disease. RIGHT IJ catheter tip in SVC.    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    Severe narrowing in the distal right superficial femoral artery and proximal popliteal artery; popliteal artery is patent at the level of the tibial plateau.    Patent right posterior tibial artery in thefoot which supplies the plantar arch; short segment of the posterior tibial artery which is not opacified at the ankle which may be secondary to motion as opposed to an occlusion.    Right anterior tibial and peroneal arteries patent to the level of the ankle and not opacified in the foot which may be flow related and related to the timing of the contrast.      High-grade stenoses in the left common iliac, external iliac, and common femoral arteries as described above with additional areas of severe narrowing left superficial femoral artery.    High-grade narrowing in the proximal left popliteal artery which is patent at the level of the tibial plateau.    Left calf arteries opacified to the level of the ankle and not opacified in the foot which flow-related and the timing of the contrast.    An arterial Doppler could be obtained to further evaluate the calf arteries and dorsalis pedis artery.    The findings were discussed with Dr. Alcala at 10:36 AM on 2/12/2021.    < end of copied text >    ADVANCE DIRECTIVES: DNR, trial of intubation

## 2021-02-19 NOTE — PROGRESS NOTE ADULT - ATTENDING COMMENTS
Patient was seen on 2/19/2021 in SICU.  Patient is doing better. No chest pain. No dyspnoea. Troponins peaked at 0.4 and now trending down to 0.27. Patient is off pressors. Hemodynamically stable. NSTEMI as considered yesterday but less likely. Patient is already on Aspirin, anticoags and Metoprolol and statins.  Saturating well on 30% FM. CTA yesterday evening neg for PE or any other acute findings.  UO is good. Cr 0.9. Continue IV fluids and start PO clears.  Insuline sliding scale with home dose Lantus 22 units q HS. Synthroid.   No ID issues.  Continue Heparin gtt.

## 2021-02-19 NOTE — PROGRESS NOTE ADULT - ATTENDING COMMENTS
Pt seen and examined. Pt still with HD instability overnight. Sen by cardiology and is not cleared for the procedure today. Pt remains on heparin gtt. CTA done reveals that the Ax fem is now occluded now as well.     R forefoot more ischemic.  Still cap refill of the heel noted on the right    Given these recent findings and inability to revascularize due to overall medical condition and NSTEMI will likely result in AKA given the progression of ischemia.  I have discussed this with the patient and family  Will tentatively reschedule for Monday

## 2021-02-19 NOTE — PROGRESS NOTE ADULT - SUBJECTIVE AND OBJECTIVE BOX
INTERVAL HPI/OVERNIGHT EVENTS:    Chest X-ray improved aeration.   Troponins continue to uptrend 0.29>0.35 yesterday morning. Cardiology evaluated and state likely demand ischemia but not acute MI.  Pt received 2 Unit PRBC given for Hb 7.1 with adequate response of Hgb to 9.0.  With transfusion, patient was weaned off vaso and gila and remained on 0.05 of levophed.  Weaned off of insulin Gtt and insulin sliding scale adjusted with optimized lantus dosing.  Remains on hydrocortisone 50 mg q8h.  Pt with tachycardia to 120 bpm.  CT angio of chest performed and negative for PE.  Lopressor given for rate control and with controlled HR, pressors were weaned off.  Standing Lopressor added with hold parameters.  Trop again trending down from 0.35 to 0.27.  Pt denies chest pain or SOB.  Tentative plan for OR today for amputation with vascular.        MEDICATIONS  (STANDING):  aspirin 325 milliGRAM(s) Oral daily  atorvastatin 80 milliGRAM(s) Oral at bedtime  ceFAZolin   IVPB 2000 milliGRAM(s) IV Intermittent every 8 hours  chlorhexidine 2% Cloths 1 Application(s) Topical <User Schedule>  dextrose 40% Gel 15 Gram(s) Oral once  dextrose 5%. 1000 milliLiter(s) (50 mL/Hr) IV Continuous <Continuous>  dextrose 5%. 1000 milliLiter(s) (100 mL/Hr) IV Continuous <Continuous>  dextrose 50% Injectable 25 Gram(s) IV Push once  dextrose 50% Injectable 12.5 Gram(s) IV Push once  dextrose 50% Injectable 25 Gram(s) IV Push once  fenofibrate Tablet 145 milliGRAM(s) Oral daily  glucagon  Injectable 1 milliGRAM(s) IntraMuscular once  heparin  Infusion. 1400 Unit(s)/Hr (14 mL/Hr) IV Continuous <Continuous>  hydrocortisone sodium succinate Injectable 50 milliGRAM(s) IV Push every 8 hours  insulin glargine Injectable (LANTUS) 30 Unit(s) SubCutaneous at bedtime  insulin lispro (ADMELOG) corrective regimen sliding scale   SubCutaneous three times a day before meals  levothyroxine 88 MICROGram(s) Oral daily  metoprolol tartrate Injectable 5 milliGRAM(s) IV Push every 6 hours  multiple electrolytes Injection Type 1 1000 milliLiter(s) (75 mL/Hr) IV Continuous <Continuous>  norepinephrine Infusion 0.05 MICROgram(s)/kG/Min (9.05 mL/Hr) IV Continuous <Continuous>  polyethylene glycol 3350 17 Gram(s) Oral daily  senna 2 Tablet(s) Oral at bedtime  tamsulosin 0.4 milliGRAM(s) Oral at bedtime    MEDICATIONS  (PRN):  acetaminophen   Tablet .. 975 milliGRAM(s) Oral every 6 hours PRN Mild Pain (1 - 3)  heparin   Injectable 8000 Unit(s) IV Push every 6 hours PRN For aPTT less than 40  heparin   Injectable 4000 Unit(s) IV Push every 6 hours PRN For aPTT between 40 - 57  HYDROmorphone  Injectable 0.5 milliGRAM(s) IV Push every 4 hours PRN Breakthrough pain  ondansetron Injectable 4 milliGRAM(s) IV Push every 6 hours PRN Nausea  oxyCODONE    IR 5 milliGRAM(s) Oral every 6 hours PRN Moderate Pain (4 - 6)  oxyCODONE    IR 10 milliGRAM(s) Oral every 6 hours PRN Severe Pain (7 - 10)      Drug Dosing Weight  Height (cm): 142.2 (09 Feb 2021 04:03)  Weight (kg): 96.5 (15 Feb 2021 23:00)  BMI (kg/m2): 47.7 (15 Feb 2021 23:00)  BSA (m2): 1.82 (15 Feb 2021 23:00)      PAST MEDICAL & SURGICAL HISTORY:  PVD (peripheral vascular disease)    DM (diabetes mellitus)    Type 2 diabetes mellitus with other specified complication, unspecified whether long term insulin use    Hyperlipidemia, unspecified hyperlipidemia type    Coronary artery disease, angina presence unspecified, unspecified vessel or lesion type, unspecified whether native or transplanted heart    Pulmonary emphysema, unspecified emphysema type    No significant past surgical history        ICU Vital Signs Last 24 Hrs  T(C): 36.4 (18 Feb 2021 16:04), Max: 36.9 (18 Feb 2021 11:49)  T(F): 97.5 (18 Feb 2021 16:04), Max: 98.4 (18 Feb 2021 11:49)  HR: 105 (19 Feb 2021 05:00) (98 - 122)  BP: 97/53 (18 Feb 2021 08:00) (80/43 - 118/65)  BP(mean): 67 (18 Feb 2021 08:00) (56 - 80)  ABP: 119/56 (19 Feb 2021 05:00) (91/48 - 137/64)  ABP(mean): 79 (19 Feb 2021 05:00) (64 - 90)  RR: 23 (19 Feb 2021 05:00) (15 - 44)  SpO2: 93% (19 Feb 2021 05:00) (92% - 99%)      ABG - ( 18 Feb 2021 14:54 )  pH, Arterial: 7.43  pH, Blood: x     /  pCO2: 37    /  pO2: 84    / HCO3: 25    / Base Excess: 0.5   /  SaO2: 97                  I&O's Detail    17 Feb 2021 07:01  -  18 Feb 2021 07:00  --------------------------------------------------------  IN:    Heparin: 230 mL    Heparin Infusion: 70 mL    Insulin: 55 mL    IV PiggyBack: 150 mL    IV PiggyBack: 416.5 mL    multiple electrolytes Injection Type 1 Bolus: 2000 mL    multiple electrolytes Injection Type 1.: 75 mL    Phenylephrine: 243.3 mL    Vasopressin: 12 mL  Total IN: 3251.8 mL    OUT:    Indwelling Catheter - Urethral (mL): 1325 mL  Total OUT: 1325 mL    Total NET: 1926.8 mL      18 Feb 2021 07:01  -  19 Feb 2021 05:26  --------------------------------------------------------  IN:    Heparin Infusion: 296 mL    Insulin: 3 mL    IV PiggyBack: 50 mL    multiple electrolytes Injection Type 1.: 828 mL    Norepinephrine: 90.5 mL    Phenylephrine: 220 mL    PRBCs (Packed Red Blood Cells): 600 mL    Vasopressin: 9.6 mL  Total IN: 2097.1 mL    OUT:    Indwelling Catheter - Urethral (mL): 940 mL  Total OUT: 940 mL    Total NET: 1157.1 mL      Physical Exam:    Gen: chronically ill appearing    HEENT: PERRL, EOMI    Neurological: Alert and oriented.  Non-focal exam.      Neck: Trachea midline, no evidence of JVD, FROM without pain, neck symmetric    Pulmonary: strong cough, breath sounds diminished at bases, on Venti mask    Cardiovascular: tachycardic, sinus rhythm. No murmurs, rubs or gallops appreciated on auscultation    Gastrointestinal: obese, soft, non-tender    : Basilio in place draining yellow urine    Extremities: Without c/c/e    Skin: R axillary cutdown site with dressing in place, clean/dry/intact, RLE discolored with PVD ulceration to great toe    Musculoskeletal: FROM without pain, no deformity or areas of tenderness      LABS:  CBC Full  -  ( 18 Feb 2021 15:41 )  WBC Count : 12.57 K/uL  RBC Count : 3.01 M/uL  Hemoglobin : 9.0 g/dL  Hematocrit : 27.5 %  Platelet Count - Automated : 188 K/uL  Mean Cell Volume : 91.4 fl  Mean Cell Hemoglobin : 29.9 pg  Mean Cell Hemoglobin Concentration : 32.7 gm/dL  Auto Neutrophil # : 10.92 K/uL  Auto Lymphocyte # : 0.44 K/uL  Auto Monocyte # : 1.09 K/uL  Auto Eosinophil # : 0.11 K/uL  Auto Basophil # : 0.00 K/uL  Auto Neutrophil % : 85.2 %  Auto Lymphocyte % : 3.5 %  Auto Monocyte % : 8.7 %  Auto Eosinophil % : 0.9 %  Auto Basophil % : 0.0 %    02-18    133<L>  |  98  |  21.0<H>  ----------------------------<  182<H>  4.4   |  22.0  |  0.82    Ca    7.4<L>      18 Feb 2021 23:07  Phos  2.9     02-18  Mg     2.1     02-18    TPro  5.1<L>  /  Alb  2.2<L>  /  TBili  0.5  /  DBili  x   /  AST  324<H>  /  ALT  121<H>  /  AlkPhos  56  02-18    PT/INR - ( 18 Feb 2021 01:36 )   PT: 16.3 sec;   INR: 1.43 ratio         PTT - ( 18 Feb 2021 22:02 )  PTT:55.4 sec

## 2021-02-19 NOTE — PROGRESS NOTE ADULT - ASSESSMENT
Assessment  postop LE revascularization awaiting thrombectomy/ limb sparing  Demand ischemia vs NSTEMI, asx without angina nor CHF no acute ECG changes  baseline Inc LBBB  Aortic sclerosis with normal EF  remote PCI with closure and single vessel CAd on med therapy post cath  COPD      rec  Cont IV heparin asa statin  increase lopressor 25 Q 6  given thrombectomy as limb sparing procedure if clinical status stable may proceed in 24- 48 hours on current medical therapy

## 2021-02-19 NOTE — SBIRT NOTE ADULT - NSSBIRTALCPOSREINDET_GEN_A_CORE
Pt states she only drinks ETOH @ holiday times. Pt states she will only have 1 drink, never more than 2 drinks. Pt states she does not have an ETOH problem. Pt is not interested in substance abuse resources.

## 2021-02-19 NOTE — PROGRESS NOTE ADULT - ASSESSMENT
80y Female w/ DM, HLD, CAD, L subclavian steel, RCA stent, and severe peripheral vascular disease admitted with RLE cellulitis, surgery consulted for RLE ischemia. Pt went to the OR on 2/15 with vascular surgery and underwent a R ax-fem/fem-pop bypass, and admitted to SICU post operatively. Pt remains w/ cool RLE without signals, plan to return to OR for possible embolectomy. Overnight, pt with acute hypotension and elevated troponin, concerning for NSTEMI.     Neuro: Pain control with multimodal analgesia, gabapentin and PRN percocet for moderate and severe pain.     CV: Troponin remains elevated but currently trending down and now 0.29.  Cardiology attribute to demand ischemia.  Remains on hep gtt and ASA.  Maintain MAP > 65.  Started on lopressor with elevated heart rate despite appearing resuscitated.  Weaned off pressors once rate controlled.      Pulm: FiO2 weaned to 30% on venti mask.  Nebs PRN for COPD.  Continue pulmonary toilet and encourage cough and deep breathing.  Chest PT and OOB to chair    GI/Nutrition: NPO for now given acute change in clinical condition and high pressor requirements. Continue bowel reg with senna and Miralax    /Renal: Huynh for strict I&O, closely monitor UOP. Replete lytes as needed.    ID: Continue Ancef, trend leukocytosis, monitor for fevers. Will continue to monitor for s/sx of infx.  F/up blood cultures    Endo:  Transitioned from insulin gtt to ISS with Lantus 30 unit.  Blood glucose currently controlled.  Continue to monitor.  Hydrocortisone 50 mg tid.  Consider tapering now off pressors    Skin: Repositioning for DTI prevention while in bed    Heme/DVT Prophylaxis: SCDs, heparing gtt - rate increased, f/u PTT in 6 hours, continue     Lines/Tubes: RIJ TLC, huynh    Dispo: DNR, care per SICU

## 2021-02-20 NOTE — PROGRESS NOTE ADULT - SUBJECTIVE AND OBJECTIVE BOX
Acute Care Surgery / Trauma Surgery   [facility] [place]  ===============================  Interval/Overnight Events: no major events yesterday. advanced to CLD. oxygenation and respiratory status improved. Off vasopressors and maintaining pressures adequately. Home Lantus resumed. FSGs improving. Cards eval rec's clear for OR in 24-48 Hrs. duonebs given overnight for cough and sob with improvement. troponins remain elevated.     VITAL SIGNS:  T(C): 36.7 (21 @ 01:44), Max: 36.7 (21 @ 01:44)  HR: 98 (21 @ 01:00) (96 - 122)  BP: --  ABP: 124/59 (21 @ 01:00) (116/59 - 151/65)  ABP(mean): 82 (21 @ 01:00) (73 - 95)  RR: 20 (21 @ 01:00) (15 - 34)  SpO2: 96% (21 @ 01:00) (93% - 98%)  CVP(mm Hg): 13 (21 @ 01:00) (3 - 19)    NEUROLOGY:  Neurologic Medications:  acetaminophen   Tablet .. 975 milliGRAM(s) Oral every 6 hours PRN  aspirin 325 milliGRAM(s) Oral daily  HYDROmorphone  Injectable 0.5 milliGRAM(s) IV Push every 4 hours PRN  ondansetron Injectable 4 milliGRAM(s) IV Push every 6 hours PRN  oxyCODONE    IR 5 milliGRAM(s) Oral every 6 hours PRN  oxyCODONE    IR 10 milliGRAM(s) Oral every 6 hours PRN    Exam: alert and oriented x 3 following commands. GCS 15    RESPIRATORY:  Respiratory Medications:  albuterol/ipratropium for Nebulization 3 milliLiter(s) Nebulizer every 6 hours    Exam: saturating well on nasal cannula  ABG - ( 2021 09:05 )  pH: 7.46  /  pCO2: 39    /  pO2: 76    / HCO3: 28    / Base Excess: 3.7   /  SaO2: 96    / Lactate: x          CARDIOVASCULAR  Cardiovascular Medications:  metoprolol tartrate 25 milliGRAM(s) Oral every 6 hours  tamsulosin 0.4 milliGRAM(s) Oral at bedtime    Exam:  VBG - ( 2021 08:13 )  pH: 7.44  /  pCO2: 45    /  pO2: 33    / HCO3: x     / Base Excess: 5.5   /  SvO2: x     / Lactate: 1.1      Cardiac Rhythm:	 NSR. hemodynamically stable     Metabolic/FLUIDS/ELECTROLYTES/NUTRITION:  Gastrointestinal Medications:  dextrose 5%. 1000 milliLiter(s) IV Continuous <Continuous>  dextrose 5%. 1000 milliLiter(s) IV Continuous <Continuous>  multiple electrolytes Injection Type 1 1000 milliLiter(s) IV Continuous <Continuous>  polyethylene glycol 3350 17 Gram(s) Oral daily  senna 2 Tablet(s) Oral at bedtime    I&O's Detail  80y  Daily Weight in k (2021 01:44)      135  |  99  |  23.0<H>  ----------------------------<  196<H>  4.6   |  24.0  |  0.87    Ca    7.5<L>      2021 05:24  Phos  2.9       Mg     2.3         TPro  5.1<L>  /  Alb  2.2<L>  /  TBili  0.5  /  DBili  x   /  AST  324<H>  /  ALT  121<H>  /  AlkPhos  56        Diet: CLD diabetic    HEMATOLOGIC:  Hematologic/Oncologic Medications:  heparin   Injectable 8000 Unit(s) IV Push every 6 hours PRN  heparin   Injectable 4000 Unit(s) IV Push every 6 hours PRN  heparin  Infusion. 1400 Unit(s)/Hr IV Continuous <Continuous>    [x] DVT Prophylaxis:                                              8.6                   Neurophils% (auto):   79.0   ( @ 05:24):    11.68)-----------(222          Lymphocytes% (auto):  9.2                                           26.1                   Eosinphils% (auto):   0.2      Manual%: Neutrophils x    ; Lymphocytes x    ; Eosinophils x    ; Bands%: x    ; Blasts x          (  @ 11:41 )   PT: x    ;   INR: x      aPTT: 59.8 sec    Transfusions:	[ ] PRBC	[ ] Platelets	[ ] FFP		[ ] Cryoprecipitate      INFECTIOUS DISEASE:  Antimicrobials/Immunologic Medications:  ceFAZolin   IVPB 2000 milliGRAM(s) IV Intermittent every 8 hours    RECENT CULTURES:        PATIENT CARE ACCESS DEVICES:  [ ] Peripheral IV  [x] Central Venous Line	[x ] R	[ ] L	[x] IJ	[ ] Fem	[ ] SC	Placed:   [x] Arterial Line		[x] R	[ ] L	[ ] Fem	[ ] Rad	[x] Ax	Placed:   [ ] PICC:					[ ] Mediport  [ ] Urinary Catheter, Date Placed:   [ ] Necessity of urinary, arterial, and venous catheters discussed    OTHER MEDICATIONS:  Endocrine/Metabolic Medications:  atorvastatin 80 milliGRAM(s) Oral at bedtime  dextrose 40% Gel 15 Gram(s) Oral once  dextrose 50% Injectable 25 Gram(s) IV Push once  dextrose 50% Injectable 12.5 Gram(s) IV Push once  dextrose 50% Injectable 25 Gram(s) IV Push once  fenofibrate Tablet 145 milliGRAM(s) Oral daily  glucagon  Injectable 1 milliGRAM(s) IntraMuscular once  insulin glargine Injectable (LANTUS) 22 Unit(s) SubCutaneous at bedtime  insulin lispro (ADMELOG) corrective regimen sliding scale   SubCutaneous three times a day before meals  levothyroxine 88 MICROGram(s) Oral daily    Genitourinary: huynh in place    Topical/Other Medications:  chlorhexidine 2% Cloths 1 Application(s) Topical <User Schedule>      IMAGING STUDIES:    ASSESSMENT/PLAN:  80y Female w/ DM, HLD, CAD, L subclavian steel, RCA stent, and severe peripheral vascular disease admitted with RLE cellulitis, surgery consulted for RLE ischemia. Pt went to the OR on 2/15 with vascular surgery and underwent a R ax-fem/fem-pop bypass, and admitted to SICU post operatively. Pt remains w/ cool RLE without signals, plan to return to OR for possible embolectomy. Overnight, pt with acute hypotension and elevated troponin, concerning for NSTEMI.     Neuro: Pain control with multimodal analgesia, gabapentin and PRN oxycodone for moderate and severe pain.     CV: Troponin remains elevated.  Cardiology attribute to demand ischemia.  Remains on hep gtt and ASA.  Maintain MAP > 65.  resumed home home metoprolol      Pulm: Wean NC as tolerated - on 5L saturating well.  Nebs PRN for COPD.  Continue pulmonary toilet and encourage cough and deep breathing.  Chest PT and OOB to chair    GI/Nutrition: CLD. Continue bowel reg with senna and Miralax    /Renal: Huynh for strict I&O, closely monitor UOP. Replete lytes as needed.    ID: Continue Ancef, trend leukocytosis, monitor for fevers. Will continue to monitor for s/sx of infx    Endo:  resumed home lantus 22 nightly. Blood glucose currently controlled.  Continue to monitor.  off pressors    Skin: Repositioning for DTI prevention while in bed    Heme/DVT Prophylaxis: SCDs, heparing gtt - rate increased, f/u PTT in 6 hours, continue     Lines/Tubes: RIJ TLC, huynh    Dispo: DNR, care per SICU

## 2021-02-20 NOTE — PROGRESS NOTE ADULT - ASSESSMENT
80 year old female with peripheral vascular disease s/p right femoral artery patch repair and endarterectomy with ax-fem, fem-pop PTFE bypass, admitted for cellulitis of RLE. Downgrade held due to acute decompensation, now w/ concern for NSTEMI and cardiogenic shock.  Recent CTA showing occlusion of graft.   - plan for OR on monday 2/22 for AKA  - appreciate cards recs for optimization  - appreciate sicu care

## 2021-02-20 NOTE — PROGRESS NOTE ADULT - SUBJECTIVE AND OBJECTIVE BOX
HPI/OVERNIGHT EVENTS:  No events overnight. Pt remains on venti mask, no pressors. moderate pain but controlled w/ pain meds. continues to have elevation in troponin with latest being 0.4. cards on board.     MEDICATIONS  (STANDING):  albuterol/ipratropium for Nebulization 3 milliLiter(s) Nebulizer every 6 hours  aspirin 325 milliGRAM(s) Oral daily  atorvastatin 80 milliGRAM(s) Oral at bedtime  ceFAZolin   IVPB 2000 milliGRAM(s) IV Intermittent every 8 hours  chlorhexidine 2% Cloths 1 Application(s) Topical <User Schedule>  dextrose 40% Gel 15 Gram(s) Oral once  dextrose 5%. 1000 milliLiter(s) (50 mL/Hr) IV Continuous <Continuous>  dextrose 5%. 1000 milliLiter(s) (100 mL/Hr) IV Continuous <Continuous>  dextrose 50% Injectable 25 Gram(s) IV Push once  dextrose 50% Injectable 12.5 Gram(s) IV Push once  dextrose 50% Injectable 25 Gram(s) IV Push once  fenofibrate Tablet 145 milliGRAM(s) Oral daily  glucagon  Injectable 1 milliGRAM(s) IntraMuscular once  heparin  Infusion. 1400 Unit(s)/Hr (14 mL/Hr) IV Continuous <Continuous>  insulin glargine Injectable (LANTUS) 22 Unit(s) SubCutaneous at bedtime  insulin lispro (ADMELOG) corrective regimen sliding scale   SubCutaneous three times a day before meals  levothyroxine 88 MICROGram(s) Oral daily  metoprolol tartrate 25 milliGRAM(s) Oral every 6 hours  multiple electrolytes Injection Type 1 1000 milliLiter(s) (75 mL/Hr) IV Continuous <Continuous>  polyethylene glycol 3350 17 Gram(s) Oral daily  senna 2 Tablet(s) Oral at bedtime  tamsulosin 0.4 milliGRAM(s) Oral at bedtime    MEDICATIONS  (PRN):  acetaminophen   Tablet .. 975 milliGRAM(s) Oral every 6 hours PRN Mild Pain (1 - 3)  heparin   Injectable 8000 Unit(s) IV Push every 6 hours PRN For aPTT less than 40  heparin   Injectable 4000 Unit(s) IV Push every 6 hours PRN For aPTT between 40 - 57  HYDROmorphone  Injectable 0.5 milliGRAM(s) IV Push every 4 hours PRN Breakthrough pain  ondansetron Injectable 4 milliGRAM(s) IV Push every 6 hours PRN Nausea  oxyCODONE    IR 5 milliGRAM(s) Oral every 6 hours PRN Moderate Pain (4 - 6)  oxyCODONE    IR 10 milliGRAM(s) Oral every 6 hours PRN Severe Pain (7 - 10)      Vital Signs Last 24 Hrs  T(C): 36.6 (19 Feb 2021 19:45), Max: 36.6 (19 Feb 2021 19:45)  T(F): 97.8 (19 Feb 2021 19:45), Max: 97.8 (19 Feb 2021 19:45)  HR: 111 (19 Feb 2021 23:00) (96 - 122)  BP: --  BP(mean): --  RR: 26 (19 Feb 2021 23:00) (15 - 34)  SpO2: 94% (19 Feb 2021 23:00) (93% - 98%)    Constitutional: patient resting comfortably in bed, in no acute distress  HEENT: EOMI / PERRL b/l  Neck: No JVD, full ROM without pain  Respiratory: respirations are unlabored, no accessory muscle use  Cardiovascular: regular rate & rhythm  Gastrointestinal: Abdomen soft, non-tender, non-distended  Vasc: unable to palpate distal pulses. No signals of right lower extremity obtained at this time, RLE cool to touch below level of knee. Delayed capillary refill   Skin: surgical sites with dressing  in place without expanding hematoma, no strikethrough of dressing, R groin prevena in place      I&O's Detail    18 Feb 2021 07:01  -  19 Feb 2021 07:00  --------------------------------------------------------  IN:    Heparin Infusion: 324 mL    Insulin: 3 mL    IV PiggyBack: 50 mL    multiple electrolytes Injection Type 1.: 978 mL    Norepinephrine: 90.5 mL    Phenylephrine: 220 mL    PRBCs (Packed Red Blood Cells): 600 mL    Vasopressin: 9.6 mL  Total IN: 2275.1 mL    OUT:    Indwelling Catheter - Urethral (mL): 995 mL  Total OUT: 995 mL    Total NET: 1280.1 mL      19 Feb 2021 07:01  -  20 Feb 2021 01:17  --------------------------------------------------------  IN:    Heparin Infusion: 224 mL    IV PiggyBack: 50 mL    multiple electrolytes Injection Type 1.: 1200 mL    Oral Fluid: 1860 mL  Total IN: 3334 mL    OUT:    Indwelling Catheter - Urethral (mL): 1030 mL    Norepinephrine: 0 mL  Total OUT: 1030 mL    Total NET: 2304 mL          LABS:                        8.6    11.68 )-----------( 222      ( 19 Feb 2021 05:24 )             26.1     02-19    135  |  99  |  23.0<H>  ----------------------------<  196<H>  4.6   |  24.0  |  0.87    Ca    7.5<L>      19 Feb 2021 05:24  Phos  2.9     02-19  Mg     2.3     02-19    TPro  5.1<L>  /  Alb  2.2<L>  /  TBili  0.5  /  DBili  x   /  AST  324<H>  /  ALT  121<H>  /  AlkPhos  56  02-18    PT/INR - ( 18 Feb 2021 01:36 )   PT: 16.3 sec;   INR: 1.43 ratio         PTT - ( 19 Feb 2021 11:41 )  PTT:59.8 sec

## 2021-02-20 NOTE — PROGRESS NOTE ADULT - SUBJECTIVE AND OBJECTIVE BOX
[facility], THE HEART CENTER                                   [address] [place] [address]                                                      PHONE: [phone]                                                         FAX: [phone]  [email or web address]  ---------------------------------------------------------------------------------------------------------------------------------    Overnight events/patient complaints: hemodynamics have improved, stable off pressors     INTERPRETATION OF TELEMETRY (personally reviewed): tachycardic      ECG: e< from: 12 Lead ECG (02.18.21 @ 07:45) >   Sinus tachycardia  Possible Anterior infarct , age undetermined  ST & T wave abnormality, consider lateral ischemia  Abnormal ECG    ECHO: < from: TTE Echo Complete w/o Contrast w/ Doppler (02.18.21 @ 14:09) >   1. Endocardial visualization was enhanced with intravenous echo contrast.   2. Technically difficult study.   3. Normal global left ventricular systolic function.   4. Left ventricular ejection fraction, by visual estimation, is 55 to 60%.   5. Basal and mid inferior wall is abnormal as described above.   6. Normal right ventricular size and function.   7. Mild thickening and calcification of the anterior and posterior mitral valve leaflets.   8. Moderate mitral annular calcification.   9. Moderate to severe mitral valve regurgitation.  10. Sclerotic aortic valve with decreased opening.  11. Poorly visualized mitralvalve with moderate stenosis and regurgitation.    STRESS TEST:    CARDIAC CATHETERIZATION:    I&O's Summary    19 Feb 2021 07:01  -  20 Feb 2021 07:00  --------------------------------------------------------  IN: 4274 mL / OUT: 1605 mL / NET: 2669 mL    20 Feb 2021 07:01  -  20 Feb 2021 09:13  --------------------------------------------------------  IN: 263 mL / OUT: 225 mL / NET: 38 mL      No Known Allergies    MEDICATIONS  (STANDING):  aspirin 325 milliGRAM(s) Oral daily  atorvastatin 80 milliGRAM(s) Oral at bedtime  ceFAZolin   IVPB 2000 milliGRAM(s) IV Intermittent every 8 hours  chlorhexidine 2% Cloths 1 Application(s) Topical <User Schedule>  dextrose 40% Gel 15 Gram(s) Oral once  dextrose 5%. 1000 milliLiter(s) (50 mL/Hr) IV Continuous <Continuous>  dextrose 5%. 1000 milliLiter(s) (100 mL/Hr) IV Continuous <Continuous>  dextrose 50% Injectable 25 Gram(s) IV Push once  dextrose 50% Injectable 12.5 Gram(s) IV Push once  dextrose 50% Injectable 25 Gram(s) IV Push once  fenofibrate Tablet 145 milliGRAM(s) Oral daily  glucagon  Injectable 1 milliGRAM(s) IntraMuscular once  heparin  Infusion. 1400 Unit(s)/Hr (14 mL/Hr) IV Continuous <Continuous>  insulin glargine Injectable (LANTUS) 22 Unit(s) SubCutaneous at bedtime  insulin lispro (ADMELOG) corrective regimen sliding scale   SubCutaneous Before meals and at bedtime  ipratropium    for Nebulization 500 MICROGram(s) Nebulizer every 6 hours  levalbuterol Inhalation 1.25 milliGRAM(s) Inhalation every 8 hours  levothyroxine 88 MICROGram(s) Oral daily  metoprolol tartrate 25 milliGRAM(s) Oral every 6 hours  polyethylene glycol 3350 17 Gram(s) Oral daily  senna 2 Tablet(s) Oral at bedtime  tamsulosin 0.4 milliGRAM(s) Oral at bedtime    MEDICATIONS  (PRN):  acetaminophen   Tablet .. 975 milliGRAM(s) Oral every 6 hours PRN Mild Pain (1 - 3)  heparin   Injectable 8000 Unit(s) IV Push every 6 hours PRN For aPTT less than 40  heparin   Injectable 4000 Unit(s) IV Push every 6 hours PRN For aPTT between 40 - 57  HYDROmorphone  Injectable 0.5 milliGRAM(s) IV Push every 4 hours PRN Breakthrough pain  ondansetron Injectable 4 milliGRAM(s) IV Push every 6 hours PRN Nausea  oxyCODONE    IR 5 milliGRAM(s) Oral every 6 hours PRN Moderate Pain (4 - 6)  oxyCODONE    IR 10 milliGRAM(s) Oral every 6 hours PRN Severe Pain (7 - 10)      Vital Signs Last 24 Hrs  T(C): 36.4 (20 Feb 2021 07:45), Max: 36.7 (20 Feb 2021 01:44)  T(F): 97.6 (20 Feb 2021 07:45), Max: 98 (20 Feb 2021 01:44)  HR: 111 (20 Feb 2021 08:49) (96 - 122)  BP: --  BP(mean): --  RR: 24 (20 Feb 2021 08:00) (16 - 34)  SpO2: 99% (20 Feb 2021 08:49) (90% - 99%)  ICU Vital Signs Last 24 Hrs    PHYSICAL EXAM:  General: chronically ill appearing   HEENT: Head; normocephalic, atraumatic. Pupils reactive, cornea wnl.  (+) JVD  CARDIOVASCULAR: Normal S1 and S2, 2/6 LION, no rub, gallop or lift.   LUNGS: tachypneic, poor effort, decreased at the bases   ABDOMEN: Soft, nontender without mass or organomegaly. bowel sounds normoactive.  EXTREMITIES: No clubbing, RLE with diminished pulses (+) anasarca   SKIN: warm and dry with normal turgor.  PSYCH: lethargic         LABS:                        8.7    11.96 )-----------( 269      ( 20 Feb 2021 04:21 )             26.8     02-20    133<L>  |  98  |  21.0<H>  ----------------------------<  192<H>  4.5   |  25.0  |  0.84    Ca    7.6<L>      20 Feb 2021 04:21  Phos  2.3     02-20  Mg     2.4     02-20    TPro  5.4<L>  /  Alb  2.4<L>  /  TBili  0.4  /  DBili  x   /  AST  143<H>  /  ALT  35<H>  /  AlkPhos  61  02-20    JAMAAL HERR  CARDIAC MARKERS ( 20 Feb 2021 04:21 )  x     / 0.44 ng/mL / x     / x     / x      CARDIAC MARKERS ( 19 Feb 2021 21:07 )  x     / 0.40 ng/mL / x     / x     / x      CARDIAC MARKERS ( 19 Feb 2021 11:19 )  x     / 0.36 ng/mL / x     / x     / x      CARDIAC MARKERS ( 18 Feb 2021 22:02 )  x     / 0.27 ng/mL / x     / x     / x      CARDIAC MARKERS ( 18 Feb 2021 15:41 )  x     / 0.35 ng/mL / 7333 U/L / x     / 39.0 ng/mL      PTT - ( 20 Feb 2021 04:21 )  PTT:36.4 sec      RADIOLOGY & ADDITIONAL STUDIES:    ASSESSMENT AND PLAN:  In summary, JAMAAL HERR is a 80y Female with past medical history significant for CAD s/p prior stent to RCA with occlusion to be managed medically, HTN, PAD, L subclavian stenosis, mild-moderate AS recently hospitalized for cellulitis who presents with recurrent cellulitis found to have critical limb ischemia now s/p axillo-bifemoral and femoropopliteal bypass and right SFA and femoral endarterectomy with patch repair now hemodynamically unstable, with worsening hypoxic respiratory failure and troponin elevation     Troponin elevation/hypoxic respiratory failure  - suspect related to type II MI in the setting of critical illness, in patient with known residual obstructive CAD, however cannot rule out plaque rupture though unlikely given the absence of anginal symtoms   - patient is already on heparin gtt, antiplatelet and statin therapy; continue  - Prior cath with known RCA occlusion to be medically managed  - serial cardiac enzymes noted   - transfuse for Hgb <7  - consider lasix 40mg IV given post resuscitation hypervolemia  - care per SICU    Critical limb ischemia/severe PAD/CAD  - s/p urgent intervention; continue heparin gtt, antiplatelet and statin therapy and now pending possible  thrombectomy/embolectomy in an attempt for limb salvage, may proceed to the OR on present cardiac therapy  - patient is at elevated risk, however given the need for limb salvage she should proceed with the planned surgical intervention   - glycemic control   - close monitoring of urine output s/p huynh placement    Thank you for allowing [facility] to participate in the care of this patient.  Please feel free to call with any questions or concerns.                  [facility], THE HEART [facility]ENTER                                   [address] [place] [address]                                                      PHONE: [phone]                                                         FAX: [phone]  [email or web address]  ---------------------------------------------------------------------------------------------------------------------------------    Overnight events/patient complaints: hemodynamics have improved, stable off pressors however on BiPAP      INTERPRETATION OF TELEMETRY (personally reviewed): tachycardic      ECG: e< from: 12 Lead ECG (02.18.21 @ 07:45) >   Sinus tachycardia  Possible Anterior infarct , age undetermined  ST & T wave abnormality, consider lateral ischemia  Abnormal ECG    ECHO: < from: TTE Echo Complete w/o Contrast w/ Doppler (02.18.21 @ 14:09) >   1. Endocardial visualization was enhanced with intravenous echo contrast.   2. Technically difficult study.   3. Normal global left ventricular systolic function.   4. Left ventricular ejection fraction, by visual estimation, is 55 to 60%.   5. Basal and mid inferior wall is abnormal as described above.   6. Normal right ventricular size and function.   7. Mild thickening and calcification of the anterior and posterior mitral valve leaflets.   8. Moderate mitral annular calcification.   9. Moderate to severe mitral valve regurgitation.  10. Sclerotic aortic valve with decreased opening.  11. Poorly visualized mitralvalve with moderate stenosis and regurgitation.    STRESS TEST:    CARDIAC CATHETERIZATION:    I&O's Summary    19 Feb 2021 07:01  -  20 Feb 2021 07:00  --------------------------------------------------------  IN: 4274 mL / OUT: 1605 mL / NET: 2669 mL    20 Feb 2021 07:01  -  20 Feb 2021 09:13  --------------------------------------------------------  IN: 263 mL / OUT: 225 mL / NET: 38 mL      No Known Allergies    MEDICATIONS  (STANDING):  aspirin 325 milliGRAM(s) Oral daily  atorvastatin 80 milliGRAM(s) Oral at bedtime  ceFAZolin   IVPB 2000 milliGRAM(s) IV Intermittent every 8 hours  chlorhexidine 2% Cloths 1 Application(s) Topical <User Schedule>  dextrose 40% Gel 15 Gram(s) Oral once  dextrose 5%. 1000 milliLiter(s) (50 mL/Hr) IV Continuous <Continuous>  dextrose 5%. 1000 milliLiter(s) (100 mL/Hr) IV Continuous <Continuous>  dextrose 50% Injectable 25 Gram(s) IV Push once  dextrose 50% Injectable 12.5 Gram(s) IV Push once  dextrose 50% Injectable 25 Gram(s) IV Push once  fenofibrate Tablet 145 milliGRAM(s) Oral daily  glucagon  Injectable 1 milliGRAM(s) IntraMuscular once  heparin  Infusion. 1400 Unit(s)/Hr (14 mL/Hr) IV Continuous <Continuous>  insulin glargine Injectable (LANTUS) 22 Unit(s) SubCutaneous at bedtime  insulin lispro (ADMELOG) corrective regimen sliding scale   SubCutaneous Before meals and at bedtime  ipratropium    for Nebulization 500 MICROGram(s) Nebulizer every 6 hours  levalbuterol Inhalation 1.25 milliGRAM(s) Inhalation every 8 hours  levothyroxine 88 MICROGram(s) Oral daily  metoprolol tartrate 25 milliGRAM(s) Oral every 6 hours  polyethylene glycol 3350 17 Gram(s) Oral daily  senna 2 Tablet(s) Oral at bedtime  tamsulosin 0.4 milliGRAM(s) Oral at bedtime    MEDICATIONS  (PRN):  acetaminophen   Tablet .. 975 milliGRAM(s) Oral every 6 hours PRN Mild Pain (1 - 3)  heparin   Injectable 8000 Unit(s) IV Push every 6 hours PRN For aPTT less than 40  heparin   Injectable 4000 Unit(s) IV Push every 6 hours PRN For aPTT between 40 - 57  HYDROmorphone  Injectable 0.5 milliGRAM(s) IV Push every 4 hours PRN Breakthrough pain  ondansetron Injectable 4 milliGRAM(s) IV Push every 6 hours PRN Nausea  oxyCODONE    IR 5 milliGRAM(s) Oral every 6 hours PRN Moderate Pain (4 - 6)  oxyCODONE    IR 10 milliGRAM(s) Oral every 6 hours PRN Severe Pain (7 - 10)      Vital Signs Last 24 Hrs  T(C): 36.4 (20 Feb 2021 07:45), Max: 36.7 (20 Feb 2021 01:44)  T(F): 97.6 (20 Feb 2021 07:45), Max: 98 (20 Feb 2021 01:44)  HR: 111 (20 Feb 2021 08:49) (96 - 122)  BP: --  BP(mean): --  RR: 24 (20 Feb 2021 08:00) (16 - 34)  SpO2: 99% (20 Feb 2021 08:49) (90% - 99%)  ICU Vital Signs Last 24 Hrs    PHYSICAL EXAM:  General: chronically ill appearing   HEENT: Head; normocephalic, atraumatic. Pupils reactive, cornea wnl.  (+) JVD  CARDIOVASCULAR: Normal S1 and S2, 2/6 LION, no rub, gallop or lift.   LUNGS: tachypneic, poor effort, decreased at the bases   ABDOMEN: Soft, nontender without mass or organomegaly. bowel sounds normoactive.  EXTREMITIES: No clubbing, RLE with diminished pulses (+) anasarca   SKIN: warm and dry with normal turgor.  PSYCH: lethargic         LABS:                        8.7    11.96 )-----------( 269      ( 20 Feb 2021 04:21 )             26.8     02-20    133<L>  |  98  |  21.0<H>  ----------------------------<  192<H>  4.5   |  25.0  |  0.84    Ca    7.6<L>      20 Feb 2021 04:21  Phos  2.3     02-20  Mg     2.4     02-20    TPro  5.4<L>  /  Alb  2.4<L>  /  TBili  0.4  /  DBili  x   /  AST  143<H>  /  ALT  35<H>  /  AlkPhos  61  02-20    JAMAAL EHRR  CARDIAC MARKERS ( 20 Feb 2021 04:21 )  x     / 0.44 ng/mL / x     / x     / x      CARDIAC MARKERS ( 19 Feb 2021 21:07 )  x     / 0.40 ng/mL / x     / x     / x      CARDIAC MARKERS ( 19 Feb 2021 11:19 )  x     / 0.36 ng/mL / x     / x     / x      CARDIAC MARKERS ( 18 Feb 2021 22:02 )  x     / 0.27 ng/mL / x     / x     / x      CARDIAC MARKERS ( 18 Feb 2021 15:41 )  x     / 0.35 ng/mL / 7333 U/L / x     / 39.0 ng/mL      PTT - ( 20 Feb 2021 04:21 )  PTT:36.4 sec      RADIOLOGY & ADDITIONAL STUDIES:    ASSESSMENT AND PLAN:  In summary, JAMAAL HERR is a 80y Female with past medical history significant for CAD s/p prior stent to RCA with occlusion to be managed medically, HTN, PAD, L subclavian stenosis, mild-moderate AS recently hospitalized for cellulitis who presents with recurrent cellulitis found to have critical limb ischemia now s/p axillo-bifemoral and femoropopliteal bypass and right SFA and femoral endarterectomy with patch repair now hemodynamically unstable, with worsening hypoxic respiratory failure and troponin elevation     Troponin elevation/hypoxic respiratory failure  - suspect related to type II MI in the setting of critical illness, in patient with known residual obstructive CAD, however cannot rule out plaque rupture though unlikely given the absence of anginal symtoms   - patient is already on heparin gtt, antiplatelet and statin therapy; continue  - Prior cath with known RCA occlusion to be medically managed  - serial cardiac enzymes noted   - transfuse for Hgb <7  - consider lasix 40mg IV given post resuscitation hypervolemia with hypoxic respiratory failure  - wean BiPAP as tolerated   - care per SICU    Critical limb ischemia/severe PAD/CAD  - s/p urgent intervention; continue heparin gtt, antiplatelet and statin therapy and now pending possible  thrombectomy/embolectomy in an attempt for limb salvage, may proceed to the OR on present cardiac therapy  - patient is at elevated risk, however given the need for limb salvage she should proceed with the planned surgical intervention   - glycemic control   - close monitoring of urine output s/p huynh placement    Thank you for allowing [facility] to participate in the care of this patient.  Please feel free to call with any questions or concerns.

## 2021-02-20 NOTE — PROGRESS NOTE ADULT - ATTENDING COMMENTS
Patient was seen on 2/20/2021 in SICU.  Patient seemed more dyspnoic saturating 92-94 % on Nasal canula. but clinically has distress. ABG SHOWS 7.42/44/28/96%.  CXR bilateral basal infiltrates Vs pleural effusions slightly worse than before. No sputum. No fever. WBC 11.9. Precalcitonin levels 0.26.  Pneumonia is possible but seems unlikely at this time. She is also fluid positive therefore will give lasix 40 mg. If her respiratory distress doesnot improve will give her BiPAP for support.  Troponins are trending up again to 0.44 and then 0.50. Mild tachycardia also 100.120. Patient is already on Beta blockers, statin, Aspirin and full anticoagulation. Will appreciate cardiology input. continue trending Troponins.    Patient was given Lasix and had a good response. More Than 2 liters UO since lasix. Her dyspnoea has improved. Will try another dose as needed.    Hold IV fluids if she tolerates PO.    Adjust Heparin gtt per protocol to goal.    Insuline sliding scale with home dose Lantus 22 units q HS. Synthroid.   Antibiotics per Vascular only Ancef. No ID issues.

## 2021-02-21 NOTE — PROGRESS NOTE ADULT - SUBJECTIVE AND OBJECTIVE BOX
[facility], THE HEART CENTER                                   [address] [place] [address]                                                      PHONE: [phone]                                                         FAX: [phone]  [email or web address]  ---------------------------------------------------------------------------------------------------------------------------------    Overnight events/patient complaints: Oxygen requirements have been de-escalated. Overnight with increasing resting leg pain with increased mottling of the right foot and bullae to the right calf. She is now planned for emergent AKA.     INTERPRETATION OF TELEMETRY (personally reviewed)    ECG: e< from: 12 Lead ECG (02.18.21 @ 07:45) >  Sinus tachycardia. Possible Anterior infarct , age undetermined. ST & T wave abnormality, consider lateral ischemia. Abnormal ECG    ECHO: < from: TTE Echo Complete w/o Contrast w/ Doppler (02.18.21 @ 14:09) >   1. Endocardial visualization was enhanced with intravenous echo contrast.   2. Technically difficult study.   3. Normal global left ventricular systolic function.   4. Left ventricular ejection fraction, by visual estimation, is 55 to 60%.   5. Basal and mid inferior wall is abnormal as described above.   6. Normal right ventricular size and function.   7. Mild thickening and calcification of the anterior and posterior mitral valve leaflets.   8. Moderate mitral annular calcification.   9. Moderate to severe mitral valve regurgitation.  10. Sclerotic aortic valve with decreased opening.  11. Poorly visualized mitralvalve with moderate stenosis and regurgitation.    I&O's Summary    20 Feb 2021 07:01  -  21 Feb 2021 07:00  --------------------------------------------------------  IN: 1370 mL / OUT: 4170 mL / NET: -2800 mL    21 Feb 2021 07:01  -  21 Feb 2021 11:37  --------------------------------------------------------  IN: 52 mL / OUT: 530 mL / NET: -478 mL        PAST MEDICAL & SURGICAL HISTORY:  PVD (peripheral vascular disease)  DM (diabetes mellitus)  Type 2 diabetes mellitus with other specified complication, unspecified whether long term insulin use  Hyperlipidemia, unspecified hyperlipidemia type  Coronary artery disease, angina presence unspecified, unspecified vessel or lesion type, unspecified whether native or transplanted heart  Pulmonary emphysema, unspecified emphysema type    No Known Allergies    MEDICATIONS  (STANDING):  atorvastatin 80 milliGRAM(s) Oral at bedtime  ceFAZolin   IVPB 2000 milliGRAM(s) IV Intermittent every 8 hours  chlorhexidine 2% Cloths 1 Application(s) Topical <User Schedule>  dextrose 40% Gel 15 Gram(s) Oral once  dextrose 5%. 1000 milliLiter(s) (50 mL/Hr) IV Continuous <Continuous>  dextrose 5%. 1000 milliLiter(s) (100 mL/Hr) IV Continuous <Continuous>  dextrose 50% Injectable 25 Gram(s) IV Push once  dextrose 50% Injectable 12.5 Gram(s) IV Push once  dextrose 50% Injectable 25 Gram(s) IV Push once  fenofibrate Tablet 145 milliGRAM(s) Oral daily  glucagon  Injectable 1 milliGRAM(s) IntraMuscular once  insulin glargine Injectable (LANTUS) 30 Unit(s) SubCutaneous at bedtime  insulin lispro (ADMELOG) corrective regimen sliding scale   SubCutaneous Before meals and at bedtime  ipratropium    for Nebulization 500 MICROGram(s) Nebulizer every 6 hours  levalbuterol Inhalation 1.25 milliGRAM(s) Inhalation every 6 hours  levothyroxine 88 MICROGram(s) Oral daily  melatonin 3 milliGRAM(s) Oral at bedtime  metoprolol tartrate 25 milliGRAM(s) Oral every 6 hours  polyethylene glycol 3350 17 Gram(s) Oral daily  senna 2 Tablet(s) Oral at bedtime  tamsulosin 0.4 milliGRAM(s) Oral at bedtime    MEDICATIONS  (PRN):  acetaminophen   Tablet .. 975 milliGRAM(s) Oral every 6 hours PRN Mild Pain (1 - 3)  HYDROmorphone  Injectable 0.5 milliGRAM(s) IV Push every 4 hours PRN Breakthrough pain  metoprolol tartrate Injectable 5 milliGRAM(s) IV Push once PRN HR >110  ondansetron Injectable 4 milliGRAM(s) IV Push every 6 hours PRN Nausea  oxyCODONE    IR 5 milliGRAM(s) Oral every 6 hours PRN Moderate Pain (4 - 6)  oxyCODONE    IR 10 milliGRAM(s) Oral every 6 hours PRN Severe Pain (7 - 10)      Vital Signs Last 24 Hrs  T(C): 36.3 (21 Feb 2021 07:13), Max: 36.9 (20 Feb 2021 15:48)  T(F): 97.4 (21 Feb 2021 07:13), Max: 98.5 (20 Feb 2021 15:48)  HR: 108 (21 Feb 2021 11:00) (84 - 120)  BP: --  BP(mean): --  RR: 16 (21 Feb 2021 11:00) (13 - 32)  SpO2: 96% (21 Feb 2021 11:00) (90% - 100%)  ICU Vital Signs Last 24 Hrs    PHYSICAL EXAM:  General: Appears well developed  HEENT: Head; normocephalic, atraumatic. Pupils reactive, cornea wnl. Neck supple, no nodes adenopathy, no JVD  CARDIOVASCULAR: Normal S1 and S2, 2/6 LION, no rub, gallop or lift.   LUNGS: No rales, rhonchi or wheeze. Normal breath sounds bilaterally.  ABDOMEN: Soft, nontender without mass or organomegaly. bowel sounds normoactive.  EXTREMITIES: (+) anasarca, mottling of the right foot and bullae to the right calf  SKIN: warm and dry with normal turgor.  NEURO: Alert/oriented x 3  PSYCH: normal affect.        LABS:                        8.5    11.75 )-----------( 312      ( 21 Feb 2021 10:58 )             26.4     02-21    137  |  100  |  18.0  ----------------------------<  109<H>  4.2   |  27.0  |  0.72    Ca    8.1<L>      21 Feb 2021 10:58  Phos  3.0     02-21  Mg     2.2     02-21    TPro  5.4<L>  /  Alb  2.5<L>  /  TBili  0.6  /  DBili  x   /  AST  105<H>  /  ALT  13  /  AlkPhos  62  02-21    JAMAAL HERR  CARDIAC MARKERS ( 21 Feb 2021 10:58 )  x     / 0.62 ng/mL / x     / x     / x      CARDIAC MARKERS ( 21 Feb 2021 03:44 )  x     / 0.78 ng/mL / x     / x     / x      CARDIAC MARKERS ( 20 Feb 2021 20:21 )  x     / 0.57 ng/mL / x     / x     / x      CARDIAC MARKERS ( 20 Feb 2021 11:43 )  x     / 0.50 ng/mL / x     / x     / x      CARDIAC MARKERS ( 20 Feb 2021 04:21 )  x     / 0.44 ng/mL / x     / x     / x      CARDIAC MARKERS ( 19 Feb 2021 21:07 )  x     / 0.40 ng/mL / x     / x     / x          PT/INR - ( 21 Feb 2021 10:58 )   PT: 14.8 sec;   INR: 1.29 ratio         PTT - ( 21 Feb 2021 10:58 )  PTT:61.4 sec      RADIOLOGY & ADDITIONAL STUDIES:    ASSESSMENT AND PLAN:  In summary, JAMAAL HERR is a 80y Female with past medical history significant for CAD s/p prior stent to RCA with occlusion to be managed medically, HTN, PAD, L subclavian stenosis, mild-moderate AS recently hospitalized for cellulitis who presents with recurrent cellulitis found to have critical limb ischemia now s/p axillo-bifemoral and femoropopliteal bypass and right SFA and femoral endarterectomy with patch repair now hemodynamically unstable, with worsening hypoxic respiratory failure and troponin elevation     Troponin elevation/hypoxic respiratory failure  - suspect related to type II MI in the setting of critical illness, and possibly rhabdo (from ischemic limb) in patient with known residual obstructive CAD. Doubt plaque rupture given the absence of anginal symptoms   - patient is already on heparin gtt, antiplatelet and statin therapy; continue  - Prior cath with known RCA occlusion to be medically managed  - serial cardiac enzymes noted   - transfuse for Hgb <7  - continue lasix 40mg IV given post resuscitation hypervolemia with hypoxic respiratory failure  - O2 support   - care per SICU    Critical limb ischemia/severe PAD/CAD  - ischemia is progressing and with increasing mottling/pain; now pending R AKA    - patient is at elevated risk, however given the need for limb salvage she should proceed with the planned surgical intervention. She is currently optimized from a cardiac perspective. Discussed with primary team and anesthesia   - glycemic control   - close monitoring of urine output s/p huynh placement      Thank you for allowing [facility] to participate in the care of this patient.  Please feel free to call with any questions or concerns.

## 2021-02-21 NOTE — BRIEF OPERATIVE NOTE - OPERATION/FINDINGS
Healthy muscle beds, sig edema
Occluded R CFA, proximal SFA, External iliac artery.  After the bypass patent pt and peroneal. Outflow to the foot via PT.   Patent DFA,

## 2021-02-21 NOTE — PROGRESS NOTE ADULT - SUBJECTIVE AND OBJECTIVE BOX
Pt post op from explant of fem-pop bypass graft and Rt LE AKA.  Pt awake, off sedation, following commands, FVTe 370.  Tolerating PS 5/5/.40.  Planned extubation of pt.  Once extubated, pt immediately became apneic and bradycardic to 30's.  Maintained organized rhythm w/ palpable pulse.  Atropine x1 dose given with return of sinus rhythm.  Respiratory therapy present throughout, bagged with PEEP valve.  SPO2 to 100%.  Anesthesia called for emergent reintubation.    Pt reintubated with RSI by anesthesia without incident.      ICU Vital Signs Last 24 Hrs  T(C): 36.8 (21 Feb 2021 15:26), Max: 36.8 (21 Feb 2021 15:26)  T(F): 98.2 (21 Feb 2021 15:26), Max: 98.2 (21 Feb 2021 15:26)  HR: 108 (21 Feb 2021 15:00) (84 - 108)  BP: --  BP(mean): --  ABP: 161/70 (21 Feb 2021 15:00) (100/51 - 183/84)  ABP(mean): 105 (21 Feb 2021 15:00) (66 - 123)  RR: 16 (21 Feb 2021 15:00) (13 - 32)  SpO2: 95% (21 Feb 2021 15:00) (93% - 100%)                              9.1    11.33 )-----------( 289      ( 21 Feb 2021 14:40 )             28.1         02-21    139  |  102  |  18.0  ----------------------------<  132<H>  4.4   |  26.0  |  0.64    Ca    7.9<L>      21 Feb 2021 14:40  Phos  3.4     02-21  Mg     2.1     02-21    TPro  5.4<L>  /  Alb  2.5<L>  /  TBili  0.6  /  DBili  x   /  AST  105<H>  /  ALT  13  /  AlkPhos  62  02-21          Dr Escobar at bedside Pt post op from explant of fem-pop bypass graft and Rt LE AKA.  Pt awake, off sedation, following commands, FVTe 370.  Tolerating PS 5/5/.40.  Planned extubation of pt.  Once extubated, pt immediately became apneic and bradycardic to 30's.  Maintained organized rhythm w/ palpable pulse.  Atropine x1 dose given with return of sinus rhythm.  Respiratory therapy present throughout, bagged with PEEP valve.  SPO2 to 100%.  Anesthesia called for emergent reintubation.    Pt reintubated with RSI by anesthesia without incident.      ICU Vital Signs Last 24 Hrs  T(C): 36.8 (21 Feb 2021 15:26), Max: 36.8 (21 Feb 2021 15:26)  T(F): 98.2 (21 Feb 2021 15:26), Max: 98.2 (21 Feb 2021 15:26)  HR: 108 (21 Feb 2021 15:00) (84 - 108)  BP: --  BP(mean): --  ABP: 161/70 (21 Feb 2021 15:00) (100/51 - 183/84)  ABP(mean): 105 (21 Feb 2021 15:00) (66 - 123)  RR: 16 (21 Feb 2021 15:00) (13 - 32)  SpO2: 95% (21 Feb 2021 15:00) (93% - 100%)                              9.1    11.33 )-----------( 289      ( 21 Feb 2021 14:40 )             28.1         02-21    139  |  102  |  18.0  ----------------------------<  132<H>  4.4   |  26.0  |  0.64    Ca    7.9<L>      21 Feb 2021 14:40  Phos  3.4     02-21  Mg     2.1     02-21    TPro  5.4<L>  /  Alb  2.5<L>  /  TBili  0.6  /  DBili  x   /  AST  105<H>  /  ALT  13  /  AlkPhos  62  02-21          Dr Escobar at bedside, primary team made aware

## 2021-02-21 NOTE — PROGRESS NOTE ADULT - SUBJECTIVE AND OBJECTIVE BOX
INTERVAL HPI/OVERNIGHT EVENTS/SUBJECTIVE: Tachycardia with Duoneb, changed to Xopenex. Increased WOB, placed on BiPAP and given Lasix 40mg with improvement. Previna removed. Patient on high flow nasal cannula overnight then transitioned to 5L nasal cannula in the AM. Lantus increased to 32U and sliding scale increased with controlled glucose readings. Patient continues to have pain to right leg. Bullae located on lateral side of right calf. Pt denies chest pain, n/v, or fever.    ICU Vital Signs Last 24 Hrs  T(C): 36.7 (21 Feb 2021 04:44), Max: 36.9 (20 Feb 2021 15:48)  T(F): 98 (21 Feb 2021 04:44), Max: 98.5 (20 Feb 2021 15:48)  HR: 93 (21 Feb 2021 05:00) (84 - 120)  BP: --  BP(mean): --  ABP: 114/51 (21 Feb 2021 05:00) (100/51 - 177/80)  ABP(mean): 72 (21 Feb 2021 05:00) (66 - 116)  RR: 18 (21 Feb 2021 05:00) (13 - 32)  SpO2: 98% (21 Feb 2021 05:00) (90% - 100%)      I&O's Detail    19 Feb 2021 07:01  -  20 Feb 2021 07:00  --------------------------------------------------------  IN:    Heparin Infusion: 344 mL    IV PiggyBack: 170 mL    IV PiggyBack: 100 mL    multiple electrolytes Injection Type 1.: 1800 mL    Oral Fluid: 1860 mL  Total IN: 4274 mL    OUT:    Indwelling Catheter - Urethral (mL): 1605 mL    Norepinephrine: 0 mL  Total OUT: 1605 mL    Total NET: 2669 mL      20 Feb 2021 07:01  -  21 Feb 2021 05:56  --------------------------------------------------------  IN:    Albumin 5%  - 250 mL: 250 mL    Heparin Infusion: 309 mL    IV PiggyBack: 340 mL    IV PiggyBack: 50 mL    multiple electrolytes Injection Type 1.: 75 mL    Oral Fluid: 270 mL  Total IN: 1294 mL    OUT:    Indwelling Catheter - Urethral (mL): 3980 mL  Total OUT: 3980 mL    Total NET: -2686 mL            ABG - ( 20 Feb 2021 08:36 )  pH, Arterial: 7.42  pH, Blood: x     /  pCO2: 44    /  pO2: 82    / HCO3: 28    / Base Excess: 3.7   /  SaO2: 96                  MEDICATIONS  (STANDING):  aspirin 325 milliGRAM(s) Oral daily  atorvastatin 80 milliGRAM(s) Oral at bedtime  ceFAZolin   IVPB 2000 milliGRAM(s) IV Intermittent every 8 hours  chlorhexidine 2% Cloths 1 Application(s) Topical <User Schedule>  dextrose 40% Gel 15 Gram(s) Oral once  dextrose 5%. 1000 milliLiter(s) (50 mL/Hr) IV Continuous <Continuous>  dextrose 5%. 1000 milliLiter(s) (100 mL/Hr) IV Continuous <Continuous>  dextrose 50% Injectable 25 Gram(s) IV Push once  dextrose 50% Injectable 12.5 Gram(s) IV Push once  dextrose 50% Injectable 25 Gram(s) IV Push once  fenofibrate Tablet 145 milliGRAM(s) Oral daily  glucagon  Injectable 1 milliGRAM(s) IntraMuscular once  heparin  Infusion. 1400 Unit(s)/Hr (14 mL/Hr) IV Continuous <Continuous>  insulin glargine Injectable (LANTUS) 30 Unit(s) SubCutaneous at bedtime  insulin lispro (ADMELOG) corrective regimen sliding scale   SubCutaneous Before meals and at bedtime  ipratropium    for Nebulization 500 MICROGram(s) Nebulizer every 6 hours  levalbuterol Inhalation 1.25 milliGRAM(s) Inhalation every 6 hours  levothyroxine 88 MICROGram(s) Oral daily  melatonin 3 milliGRAM(s) Oral at bedtime  metoprolol tartrate 25 milliGRAM(s) Oral every 6 hours  polyethylene glycol 3350 17 Gram(s) Oral daily  senna 2 Tablet(s) Oral at bedtime  tamsulosin 0.4 milliGRAM(s) Oral at bedtime    MEDICATIONS  (PRN):  acetaminophen   Tablet .. 975 milliGRAM(s) Oral every 6 hours PRN Mild Pain (1 - 3)  heparin   Injectable 8000 Unit(s) IV Push every 6 hours PRN For aPTT less than 40  heparin   Injectable 4000 Unit(s) IV Push every 6 hours PRN For aPTT between 40 - 57  HYDROmorphone  Injectable 0.5 milliGRAM(s) IV Push every 4 hours PRN Breakthrough pain  metoprolol tartrate Injectable 5 milliGRAM(s) IV Push once PRN HR >110  ondansetron Injectable 4 milliGRAM(s) IV Push every 6 hours PRN Nausea  oxyCODONE    IR 5 milliGRAM(s) Oral every 6 hours PRN Moderate Pain (4 - 6)  oxyCODONE    IR 10 milliGRAM(s) Oral every 6 hours PRN Severe Pain (7 - 10)      NUTRITION/IVF:     CENTRAL LINE:  LOCATION:   DATE INSERTED:  CVP:  SCVO2:    ZAMBRANO:   DATE INSERTED:    A-LINE:    LOCATION:   DATE INSERTED:   SVV:  CO/CI:     CHEST TUBE:  LOCATION:  DATE INSERTED: OUTPUT/24 HRS:  SUCTION/WATER SEAL:     NG/OG TUBE:  DATE INSERTED:  OUTPUT/24 HRS:    MISC:     PHYSICAL EXAM:     Gen: On high flow nasal cannula, sitting up in bed, in NAD.     Neurological: A&Ox3, GCS 15, No focal deficit     Neck: Supple. NT AT, FROM no pain.  No JVD. No meningeal signs    Pulmonary: On high flow nasal cannula, no rales, ronchi, or wheezes. No accessory muscles use for respiration.      Cardiovascular: RRR, S1, S2, No Murmurs, rubs or gallops noted.    Gastrointestinal: ND, Soft, NT.     Extremities: Ulcer at base of right first metatarsal with surrounding erythema and necrotic changes. 10cm x 3cm fluid filled bulla on right lateral aspect of lower leg. RLE is cool with absent pulse, decreased sensation, and tenderness to palpation.              LABS:  CBC Full  -  ( 21 Feb 2021 03:44 )  WBC Count : 11.41 K/uL  RBC Count : 2.75 M/uL  Hemoglobin : 8.3 g/dL  Hematocrit : 25.9 %  Platelet Count - Automated : 297 K/uL  Mean Cell Volume : 94.2 fl  Mean Cell Hemoglobin : 30.2 pg  Mean Cell Hemoglobin Concentration : 32.0 gm/dL  Auto Neutrophil # : 8.10 K/uL  Auto Lymphocyte # : 1.47 K/uL  Auto Monocyte # : 1.12 K/uL  Auto Eosinophil # : 0.16 K/uL  Auto Basophil # : 0.03 K/uL  Auto Neutrophil % : 71.0 %  Auto Lymphocyte % : 12.9 %  Auto Monocyte % : 9.8 %  Auto Eosinophil % : 1.4 %  Auto Basophil % : 0.3 %    02-21    137  |  100  |  20.0  ----------------------------<  81  3.9   |  27.0  |  0.77    Ca    7.9<L>      21 Feb 2021 03:44  Phos  3.4     02-21  Mg     2.2     02-21    TPro  5.2<L>  /  Alb  2.3<L>  /  TBili  0.4  /  DBili  x   /  AST  104<H>  /  ALT  16  /  AlkPhos  62  02-21    PTT - ( 21 Feb 2021 02:45 )  PTT:73.2 sec    RECENT CULTURES:  02-18 .Blood Blood XXXX XXXX   No growth at 48 hours        LIVER FUNCTIONS - ( 21 Feb 2021 03:44 )  Alb: 2.3 g/dL / Pro: 5.2 g/dL / ALK PHOS: 62 U/L / ALT: 16 U/L / AST: 104 U/L / GGT: x           CARDIAC MARKERS ( 21 Feb 2021 03:44 )  x     / 0.78 ng/mL / x     / x     / x      CARDIAC MARKERS ( 20 Feb 2021 20:21 )  x     / 0.57 ng/mL / x     / x     / x      CARDIAC MARKERS ( 20 Feb 2021 11:43 )  x     / 0.50 ng/mL / x     / x     / x      CARDIAC MARKERS ( 20 Feb 2021 04:21 )  x     / 0.44 ng/mL / x     / x     / x      CARDIAC MARKERS ( 19 Feb 2021 21:07 )  x     / 0.40 ng/mL / x     / x     / x      CARDIAC MARKERS ( 19 Feb 2021 11:19 )  x     / 0.36 ng/mL / x     / x     / x          CAPILLARY BLOOD GLUCOSE      RADIOLOGY & ADDITIONAL STUDIES:    ASSESSMENT/PLAN:  80 y.o. female  with pmhx of PVD, DMII, HLD, CAD, emphysema, L subclavian steel syndrome s/p ax-fem-fem-pop bypass. Pt continues to have cool RLE, without pulses and diminished sensation. RLL is tender to palpation.         Neurological: Continue Oxycodone and Dilaudid PRN for pain mgmt.     Pulmonary: Patient transitioned from high flow nasal cannula to nasual cannula on 5L. Continuous pulse oximetry monitoring. Continue Xopenex PRN. Elevate head of bed 35-40 degrees.     Cardiovascular:  Troponins continue to stay elevated, Continue heparin gtt and aspirin. continue home medication Metoprolol 25mg q 6hrs.    Gastrointestinal: Continue senna and miralax    Genitourinary: Monitor I and Os strictly.     Heme: Heparin ggt and SCD's. Monitor PTT's    Endo: Increased bedtime Lantus to 32U, blood glucose currently Mildly low.  Will reduce Lantus back to 30 QHS and decrease ISS.  Monitor blood glucose AC/ HS.  I will determine later about further adjustment to lantus tonight..     ID: Continue Ancef, monitor for signs/sxs of infection.     Skin:     Lines/ Tubes: RIJ TLC, R axillary line    Dispo: Care as above.  Plan for OR Monday for Amputation if pt agrees.         CRITICAL CARE TIME SPENT:

## 2021-02-21 NOTE — BRIEF OPERATIVE NOTE - NSICDXBRIEFPOSTOP_GEN_ALL_CORE_FT
POST-OP DIAGNOSIS:  Athscl nonbiol bypass of the extrm w gangrene, right leg 21-Feb-2021 13:45:44  Surendra Gonsalez

## 2021-02-21 NOTE — PROGRESS NOTE ADULT - ASSESSMENT
A/P: 80 year old female with peripheral vascular disease s/p right femoral artery patch repair and endarterectomy with ax-fem, fem-pop PTFE bypass, admitted for cellulitis of RLE. Recent CTA showing occlusion of graft. Remains in SICU due to concern for NSTEMI and cardiogenic shock, although no longer requiring pressors to maintain adequate MAPs.     -Anticipate OR on 2/22 for right AKA  -Check right groin wound daily  -Appreciate cards recs for optimization  -Remainder of management per SICU

## 2021-02-21 NOTE — BRIEF OPERATIVE NOTE - NSICDXBRIEFPREOP_GEN_ALL_CORE_FT
PRE-OP DIAGNOSIS:  Athscl nonbiol bypass of the extrm w gangrene, right leg 21-Feb-2021 13:45:17  Surendra Gonsalez  
PRE-OP DIAGNOSIS:  Iliac artery occlusion 15-Feb-2021 15:38:49  Israel Wells  Femoral artery occlusion 15-Feb-2021 15:38:38  Israel Wells

## 2021-02-21 NOTE — PROGRESS NOTE ADULT - ATTENDING COMMENTS
worsening limb ischemia, pain in thigh currently, worse than last night, foot is mottled, rising troponin, stat labs to be sent, including type and cross 2prbc,   plan for OR today, pending cardiology clearance, to be added on OR sched for Dr. Gonsalez for R TANNA, d/w patient, pt is agreeable, and understands plan for surgery, all questions answered, pt at elevated risk, d/w resident

## 2021-02-21 NOTE — PROGRESS NOTE ADULT - SUBJECTIVE AND OBJECTIVE BOX
HPI/OVERNIGHT EVENTS: No acute events overnight. Patient on high flow NC on evaluation. Patient continues to note pain in right foot, with control with pain medication. No other complaints. Prevena vac taken down from right groin wound. Tolerating CLD, voiding and passing BMs. No f/c/n/v, chest pain, SOB. Troponins persistently elevated, with most recent value 0.5.    MEDICATIONS  (STANDING):  aspirin 325 milliGRAM(s) Oral daily  atorvastatin 80 milliGRAM(s) Oral at bedtime  ceFAZolin   IVPB 2000 milliGRAM(s) IV Intermittent every 8 hours  chlorhexidine 2% Cloths 1 Application(s) Topical <User Schedule>  dextrose 40% Gel 15 Gram(s) Oral once  dextrose 5%. 1000 milliLiter(s) (50 mL/Hr) IV Continuous <Continuous>  dextrose 5%. 1000 milliLiter(s) (100 mL/Hr) IV Continuous <Continuous>  dextrose 50% Injectable 25 Gram(s) IV Push once  dextrose 50% Injectable 12.5 Gram(s) IV Push once  dextrose 50% Injectable 25 Gram(s) IV Push once  fenofibrate Tablet 145 milliGRAM(s) Oral daily  glucagon  Injectable 1 milliGRAM(s) IntraMuscular once  heparin  Infusion. 1400 Unit(s)/Hr (14 mL/Hr) IV Continuous <Continuous>  insulin glargine Injectable (LANTUS) 32 Unit(s) SubCutaneous at bedtime  insulin lispro (ADMELOG) corrective regimen sliding scale   SubCutaneous Before meals and at bedtime  ipratropium    for Nebulization 500 MICROGram(s) Nebulizer every 6 hours  levalbuterol Inhalation 1.25 milliGRAM(s) Inhalation every 6 hours  levothyroxine 88 MICROGram(s) Oral daily  melatonin 3 milliGRAM(s) Oral at bedtime  metoprolol tartrate 25 milliGRAM(s) Oral every 6 hours  polyethylene glycol 3350 17 Gram(s) Oral daily  senna 2 Tablet(s) Oral at bedtime  tamsulosin 0.4 milliGRAM(s) Oral at bedtime    MEDICATIONS  (PRN):  acetaminophen   Tablet .. 975 milliGRAM(s) Oral every 6 hours PRN Mild Pain (1 - 3)  heparin   Injectable 8000 Unit(s) IV Push every 6 hours PRN For aPTT less than 40  heparin   Injectable 4000 Unit(s) IV Push every 6 hours PRN For aPTT between 40 - 57  HYDROmorphone  Injectable 0.5 milliGRAM(s) IV Push every 4 hours PRN Breakthrough pain  metoprolol tartrate Injectable 5 milliGRAM(s) IV Push once PRN HR >110  ondansetron Injectable 4 milliGRAM(s) IV Push every 6 hours PRN Nausea  oxyCODONE    IR 5 milliGRAM(s) Oral every 6 hours PRN Moderate Pain (4 - 6)  oxyCODONE    IR 10 milliGRAM(s) Oral every 6 hours PRN Severe Pain (7 - 10)      Vital Signs Last 24 Hrs  T(C): 36.5 (20 Feb 2021 23:54), Max: 36.9 (20 Feb 2021 15:48)  T(F): 97.7 (20 Feb 2021 23:54), Max: 98.5 (20 Feb 2021 15:48)  HR: 103 (21 Feb 2021 00:00) (99 - 120)  BP: --  BP(mean): --  RR: 14 (21 Feb 2021 00:00) (13 - 32)  SpO2: 98% (21 Feb 2021 00:00) (90% - 100%)    Constitutional: patient resting comfortably in bed, in no acute distress  HEENT: EOMI / PERRL b/l  Neck: No JVD, full ROM without pain  Respiratory: respirations are unlabored, no accessory muscle use  Cardiovascular: regular rate & rhythm  Gastrointestinal: Abdomen soft, non-tender, non-distended  Vasc: No palpable distal pulses of b/l LE. No signals of right lower extremity obtained, RLE cool to touch below level of knee. Delayed capillary refill.   Extremities: 1.5cm x 1.5cm ulcer at base of 1st metatarsal, with surrounding erythematous and necrotic changes. Approximately 10cm x 4cm fluid-filled bulla on lateral aspect of right leg. Right groin wound c/d/i, without evidence of hematoma    I&O's Detail    19 Feb 2021 07:01  -  20 Feb 2021 07:00  --------------------------------------------------------  IN:    Heparin Infusion: 344 mL    IV PiggyBack: 170 mL    IV PiggyBack: 100 mL    multiple electrolytes Injection Type 1.: 1800 mL    Oral Fluid: 1860 mL  Total IN: 4274 mL    OUT:    Indwelling Catheter - Urethral (mL): 1605 mL    Norepinephrine: 0 mL  Total OUT: 1605 mL    Total NET: 2669 mL      20 Feb 2021 07:01  -  21 Feb 2021 01:39  --------------------------------------------------------  IN:    Heparin Infusion: 257 mL    IV PiggyBack: 340 mL    IV PiggyBack: 50 mL    multiple electrolytes Injection Type 1.: 75 mL    Oral Fluid: 270 mL  Total IN: 992 mL    OUT:    Indwelling Catheter - Urethral (mL): 3770 mL  Total OUT: 3770 mL    Total NET: -2778 mL          LABS:                        8.7    11.96 )-----------( 269      ( 20 Feb 2021 04:21 )             26.8     02-20    133<L>  |  98  |  21.0<H>  ----------------------------<  192<H>  4.5   |  25.0  |  0.84    Ca    7.6<L>      20 Feb 2021 04:21  Phos  2.3     02-20  Mg     2.4     02-20    TPro  5.4<L>  /  Alb  2.4<L>  /  TBili  0.4  /  DBili  x   /  AST  143<H>  /  ALT  35<H>  /  AlkPhos  61  02-20    PTT - ( 20 Feb 2021 20:19 )  PTT:100.0 sec

## 2021-02-21 NOTE — BRIEF OPERATIVE NOTE - NSICDXBRIEFPROCEDURE_GEN_ALL_CORE_FT
PROCEDURES:  Surgical removal of femoral artery bypass graft 21-Feb-2021 13:46:28  Surendra Gonsalez  Right above knee amputation 21-Feb-2021 13:46:00  Surendra Gonsalez  
PROCEDURES:  Bypass graft, axillo-bifemoral and femoropopliteal 15-Feb-2021 15:37:31  Israel Wells  Angiography rt extremity 15-Feb-2021 15:35:07  Israel Wells  Endarterectomy of right superficial femoral artery 15-Feb-2021 15:34:27  Israel Wells  Endarterectomy, common femoral 15-Feb-2021 15:33:54  Israel Wells

## 2021-02-21 NOTE — PROGRESS NOTE ADULT - ATTENDING COMMENTS
79 yo F with PMH of PVD, DMII, HLD, CAD, emphysema, L subclavian steel syndrome s/p ax-fem-fem-pop bypass presents pulses and diminished sensation to RLL indicative for acute ischemia   AAOx3, NAD  MS RLE with pulseless right foot with ischemic changes  Plan  1. to OR today with vascular for RLE   2. Continue metoprolol per cardiology  3. Continue HFNC  4. Monitor BG and continue ISS/ lantus      code [id number]

## 2021-02-21 NOTE — PROGRESS NOTE ADULT - SUBJECTIVE AND OBJECTIVE BOX
80F postop AKA earlier today. Patient returned to ICU postop intubated in the setting of hemodynamic instability and poor resp effort.   Pt extubated by primary team.  Called to emergently reintubate the patient in SICU.     RSI; FENTANYL 100MCG, LIDOCAINE 100MG, ETOMIDATE 20MG, SUCCINYLCHOLINE 100MG    GLIDESCOPE 4 - 7.5ETT PLACED.  +CO2 DETECTOR    TUBE SECURED    No obvious complications.

## 2021-02-22 NOTE — PROGRESS NOTE ADULT - SUBJECTIVE AND OBJECTIVE BOX
[facility]                                   [address] [place] [address]                                                      PHONE: [phone]                                                         FAX: [phone]  [email or web address]  ---------------------------------------------------------------------------------------------------------------------------------    Overnight events/patient complaints: events noted, s/p BKA, postop hypoxemia, bradycardia treated with atropine & intubation, currently in SR      No Known Allergies    MEDICATIONS  (STANDING):  atorvastatin 80 milliGRAM(s) Oral at bedtime  chlorhexidine 0.12% Liquid 15 milliLiter(s) Oral Mucosa every 12 hours  chlorhexidine 2% Cloths 1 Application(s) Topical <User Schedule>  dexMEDEtomidine Infusion 0.3 MICROgram(s)/kG/Hr (7.24 mL/Hr) IV Continuous <Continuous>  dextrose 40% Gel 15 Gram(s) Oral once  dextrose 5%. 1000 milliLiter(s) (100 mL/Hr) IV Continuous <Continuous>  dextrose 50% Injectable 25 Gram(s) IV Push once  dextrose 50% Injectable 12.5 Gram(s) IV Push once  dextrose 50% Injectable 25 Gram(s) IV Push once  fenofibrate Tablet 145 milliGRAM(s) Oral daily  fentaNYL   Infusion 1 MICROgram(s)/kG/Hr (9.65 mL/Hr) IV Continuous <Continuous>  glucagon  Injectable 1 milliGRAM(s) IntraMuscular once  heparin  Infusion. 1300 Unit(s)/Hr (13 mL/Hr) IV Continuous <Continuous>  insulin glargine Injectable (LANTUS) 20 Unit(s) SubCutaneous at bedtime  insulin lispro (ADMELOG) corrective regimen sliding scale   SubCutaneous every 4 hours  ipratropium    for Nebulization 500 MICROGram(s) Nebulizer every 6 hours  levalbuterol Inhalation 1.25 milliGRAM(s) Inhalation every 6 hours  levothyroxine 88 MICROGram(s) Oral daily  melatonin 3 milliGRAM(s) Oral at bedtime  metoprolol tartrate 25 milliGRAM(s) Enteral Tube every 6 hours  multiple electrolytes Injection Type 1 500 milliLiter(s) (50 mL/Hr) IV Continuous <Continuous>  polyethylene glycol 3350 17 Gram(s) Oral daily  senna 2 Tablet(s) Oral at bedtime    MEDICATIONS  (PRN):  acetaminophen   Tablet .. 975 milliGRAM(s) Oral every 6 hours PRN Mild Pain (1 - 3)  HYDROmorphone  Injectable 0.5 milliGRAM(s) IV Push every 4 hours PRN Breakthrough pain  oxyCODONE    IR 5 milliGRAM(s) Oral every 6 hours PRN Moderate Pain (4 - 6)      Vital Signs Last 24 Hrs  T(C): 37.4 (2021 08:00), Max: 37.4 (2021 08:00)  T(F): 99.3 (2021 08:00), Max: 99.3 (2021 08:00)  HR: 90 (2021 08:22) (47 - 134)  BP: 113/59 (2021 08:00) (113/59 - 113/59)  BP(mean): 75 (2021 08:00) (75 - 75)  RR: 22 (2021 08:00) (14 - 26)  SpO2: 96% (2021 08:22) (95% - 100%)  Daily     Daily Weight in k.4 (2021 00:13)  ICU Vital Signs Last 24 Hrs  JAMAAL HERR  I&O's Detail    2021 07:01  -  2021 07:00  --------------------------------------------------------  IN:    Dexmedetomidine: 19.2 mL    Enteral Tube Flush: 200 mL    FentaNYL: 29.1 mL    FentaNYL: 4.9 mL    FentaNYL: 77.6 mL    FentaNYL: 9.6 mL    Glucerna 1.5: 180 mL    Heparin Infusion: 106 mL    Heparin Infusion: 28 mL    Heparin Infusion: 52 mL    multiple electrolytes Injection Type 1.: 550 mL  Total IN: 1256.4 mL    OUT:    Indwelling Catheter - Urethral (mL): 1240 mL  Total OUT: 1240 mL    Total NET: 16.4 mL      2021 07:01  -  2021 08:51  --------------------------------------------------------  IN:    Dexmedetomidine: 12.1 mL    FentaNYL: 9.7 mL    Glucerna 1.5: 35 mL    Heparin Infusion: 15 mL    multiple electrolytes Injection Type 1.: 50 mL  Total IN: 121.8 mL    OUT:    Indwelling Catheter - Urethral (mL): 45 mL  Total OUT: 45 mL    Total NET: 76.8 mL        I&O's Summary    2021 07:  -  2021 07:00  --------------------------------------------------------  IN: 1256.4 mL / OUT: 1240 mL / NET: 16.4 mL    2021 07:01  -  2021 08:51  --------------------------------------------------------  IN: 121.8 mL / OUT: 45 mL / NET: 76.8 mL      Drug Dosing Weight  JAMAAL KLOOR  Mode: CPAP with PS, FiO2: 40, PEEP: 8, PS: 10, MAP: 11    PHYSICAL EXAM:  General: Appears well developed, well nourished alert and cooperative.  HEENT: Head; normocephalic, atraumatic.  Eyes: Pupils reactive, cornea wnl.  Neck: Supple, no nodes adenopathy, no NVD or carotid bruit or thyromegaly.  CARDIOVASCULAR: Normal S1 and S2, No murmur, rub, gallop or lift.   LUNGS:coarse rhonchi  ABDOMEN: Soft, nontender without mass or organomegaly. bowel sounds normoactive.  EXTREMITIES: No clubbing, cyanosis or edema. Distal pulses wnl.   SKIN: warm and dry with normal turgor.  NEURO: Alert/oriented x 3/normal motor exam. No pathologic reflexes.    PSYCH: normal affect.        LABS:                        8.2    11.46 )-----------( 304      ( 2021 05:56 )             26.4     02-    136  |  101  |  19.0  ----------------------------<  116<H>  4.6   |  27.0  |  0.60    Ca    8.1<L>      2021 05:56  Phos  2.7       Mg     2.0     -    TPro  5.4<L>  /  Alb  2.5<L>  /  TBili  0.6  /  DBili  x   /  AST  105<H>  /  ALT  13  /  AlkPhos  62  02-    JAMAAL HERR  CARDIAC MARKERS ( 2021 17:46 )  x     / 0.67 ng/mL / x     / x     / x      CARDIAC MARKERS ( 2021 16:32 )  x     / x     / x     / x     / 12.7 ng/mL  CARDIAC MARKERS ( 2021 14:40 )  x     / x     / 4191 U/L / x     / x      CARDIAC MARKERS ( 2021 10:58 )  x     / 0.62 ng/mL / x     / x     / x      CARDIAC MARKERS ( 2021 03:44 )  x     / 0.78 ng/mL / x     / x     / x      CARDIAC MARKERS ( 2021 20:21 )  x     / 0.57 ng/mL / x     / x     / x      CARDIAC MARKERS ( 2021 11:43 )  x     / 0.50 ng/mL / x     / x     / x          PT/INR - ( 2021 17:47 )   PT: 14.6 sec;   INR: 1.27 ratio         PTT - ( 2021 05:56 )  PTT:55.1 sec      RADIOLOGY & ADDITIONAL STUDIES:< from: Xray Chest 1 View- PORTABLE-Urgent (Xray Chest 1 View- PORTABLE-Urgent .) (21 @ 15:51) >    IMPRESSION:  Tubes and line as above.  No significant interval change in bilateral pleural-parenchymal pattern.            SUZE ANGELES MD; AttendingRadiologist  This document has been electronically signed. 2021  4:48PM    < end of copied text >      INTERPRETATION OF TELEMETRY (personally reviewed):    ECG:    ECHO:< from: TTE Echo Complete w/o Contrast w/ Doppler (21 @ 14:09) >    Summary:   1. Endocardial visualization was enhanced with intravenous echo contrast.   2. Technically difficult study.   3. Normal global left ventricular systolic function.   4. Left ventricular ejection fraction, by visual estimation, is 55 to 60%.   5. Basal and mid inferior wall is abnormal as described above.   6. Normal right ventricular size and function.   7. Mild thickening and calcification of the anterior and posterior mitral valve leaflets.   8. Moderate mitral annular calcification.   9. Moderate to severe mitral valve regurgitation.  10. Sclerotic aortic valve with decreased opening.  11. Poorly visualized mitralvalve with moderate stenosis and regurgitation.    MD Fani Electronically signed on 2021 at 10:22:39 AM        < end of copied text >

## 2021-02-22 NOTE — PROGRESS NOTE ADULT - SUBJECTIVE AND OBJECTIVE BOX
OVERNIGHT EVENTS:     Present Symptoms:     Dyspnea: 0 1 2 3   Nausea/Vomiting: Yes No  Anxiety:  Yes No  Depression: Yes No  Fatigue: Yes No  Loss of appetite: Yes No    Pain:             Character-            Duration-            Effect-            Factors-            Frequency-            Location-            Severity-    Review of Systems: Reviewed                     Negative:                     Positive:  Unable to obtain due to poor mentation   All others negative    MEDICATIONS  (STANDING):  atorvastatin 80 milliGRAM(s) Oral at bedtime  chlorhexidine 0.12% Liquid 15 milliLiter(s) Oral Mucosa every 12 hours  chlorhexidine 2% Cloths 1 Application(s) Topical <User Schedule>  dexMEDEtomidine Infusion 0.3 MICROgram(s)/kG/Hr (7.24 mL/Hr) IV Continuous <Continuous>  dextrose 40% Gel 15 Gram(s) Oral once  dextrose 5%. 1000 milliLiter(s) (100 mL/Hr) IV Continuous <Continuous>  dextrose 50% Injectable 25 Gram(s) IV Push once  dextrose 50% Injectable 12.5 Gram(s) IV Push once  dextrose 50% Injectable 25 Gram(s) IV Push once  fenofibrate Tablet 145 milliGRAM(s) Oral daily  glucagon  Injectable 1 milliGRAM(s) IntraMuscular once  heparin  Infusion. 1300 Unit(s)/Hr (13 mL/Hr) IV Continuous <Continuous>  insulin glargine Injectable (LANTUS) 20 Unit(s) SubCutaneous at bedtime  insulin lispro (ADMELOG) corrective regimen sliding scale   SubCutaneous every 4 hours  levothyroxine 88 MICROGram(s) Oral daily  melatonin 3 milliGRAM(s) Oral at bedtime  metoprolol tartrate 25 milliGRAM(s) Enteral Tube every 6 hours  multiple electrolytes Injection Type 1 500 milliLiter(s) (50 mL/Hr) IV Continuous <Continuous>  polyethylene glycol 3350 17 Gram(s) Oral daily  senna 2 Tablet(s) Oral at bedtime    MEDICATIONS  (PRN):  acetaminophen   Tablet .. 975 milliGRAM(s) Oral every 6 hours PRN Mild Pain (1 - 3)  HYDROmorphone  Injectable 0.5 milliGRAM(s) IV Push every 4 hours PRN Severe Pain (7 - 10)  oxyCODONE    IR 5 milliGRAM(s) Oral every 6 hours PRN Moderate Pain (4 - 6)  oxyCODONE    IR 10 milliGRAM(s) Oral every 4 hours PRN Severe Pain (7 - 10)      PHYSICAL EXAM:    Vital Signs Last 24 Hrs  T(C): 37.7 (22 Feb 2021 12:02), Max: 37.7 (22 Feb 2021 12:02)  T(F): 99.9 (22 Feb 2021 12:02), Max: 99.9 (22 Feb 2021 12:02)  HR: 104 (22 Feb 2021 12:02) (47 - 134)  BP: 99/54 (22 Feb 2021 12:00) (96/58 - 113/59)  BP(mean): 69 (22 Feb 2021 12:00) (69 - 75)  RR: 21 (22 Feb 2021 12:00) (14 - 26)  SpO2: 96% (22 Feb 2021 12:02) (95% - 100%)    General: alert  oriented x ____ lethargic agitated                  cachexia  nonverbal  coma    Karnofsky:  %    HEENT: normal  dry mouth  ET tube/trach    Lungs: comfortable tachypnea/labored breathing  excessive secretions    CV: normal  tachycardia    GI: normal  distended  tender  no BS               PEG/NG/OG tube  constipation  last BM:     : normal  incontinent  oliguria/anuria  huynh    MSK: normal  weakness  edema             ambulatory  bedbound/wheelchair bound    Skin: normal  pressure ulcers- Stage_____  no rash    LABS:                      8.2    11.46 )-----------( 304      ( 22 Feb 2021 05:56 )             26.4     02-22    136  |  101  |  19.0  ----------------------------<  116<H>  4.6   |  27.0  |  0.60    Ca    8.1<L>      22 Feb 2021 05:56  Phos  2.7     02-22  Mg     2.0     02-22    TPro  5.4<L>  /  Alb  2.5<L>  /  TBili  0.6  /  DBili  x   /  AST  105<H>  /  ALT  13  /  AlkPhos  62  02-21    PT/INR - ( 21 Feb 2021 17:47 )   PT: 14.6 sec;   INR: 1.27 ratio       PTT - ( 22 Feb 2021 12:42 )  PTT:72.0 sec    I&O's Summary    21 Feb 2021 07:01 - 22 Feb 2021 07:00  --------------------------------------------------------  IN: 1256.4 mL / OUT: 1240 mL / NET: 16.4 mL    22 Feb 2021 07:01  -  22 Feb 2021 13:02  --------------------------------------------------------  IN: 649.5 mL / OUT: 150 mL / NET: 499.5 mL        RADIOLOGY & ADDITIONAL STUDIES:    ADVANCE DIRECTIVES:   DNR YES NO  Completed on:                     PIETRO  YES NO   Completed on:  Living Will  YES NO   Completed on:       OVERNIGHT EVENTS: intubated post op     Present Symptoms:     Dyspnea: 0 on vent   Nausea/Vomiting: No  Anxiety:  No  Depression: unable   Fatigue: unable   Loss of appetite: unable     Pain: none             Character-            Duration-            Effect-            Factors-            Frequency-            Location-            Severity-    Review of Systems: Reviewed              Unable to obtain due to poor mentation   All others negative    MEDICATIONS  (STANDING):  atorvastatin 80 milliGRAM(s) Oral at bedtime  chlorhexidine 0.12% Liquid 15 milliLiter(s) Oral Mucosa every 12 hours  chlorhexidine 2% Cloths 1 Application(s) Topical <User Schedule>  dexMEDEtomidine Infusion 0.3 MICROgram(s)/kG/Hr (7.24 mL/Hr) IV Continuous <Continuous>  dextrose 40% Gel 15 Gram(s) Oral once  dextrose 5%. 1000 milliLiter(s) (100 mL/Hr) IV Continuous <Continuous>  dextrose 50% Injectable 25 Gram(s) IV Push once  dextrose 50% Injectable 12.5 Gram(s) IV Push once  dextrose 50% Injectable 25 Gram(s) IV Push once  fenofibrate Tablet 145 milliGRAM(s) Oral daily  glucagon  Injectable 1 milliGRAM(s) IntraMuscular once  heparin  Infusion. 1300 Unit(s)/Hr (13 mL/Hr) IV Continuous <Continuous>  insulin glargine Injectable (LANTUS) 20 Unit(s) SubCutaneous at bedtime  insulin lispro (ADMELOG) corrective regimen sliding scale   SubCutaneous every 4 hours  levothyroxine 88 MICROGram(s) Oral daily  melatonin 3 milliGRAM(s) Oral at bedtime  metoprolol tartrate 25 milliGRAM(s) Enteral Tube every 6 hours  multiple electrolytes Injection Type 1 500 milliLiter(s) (50 mL/Hr) IV Continuous <Continuous>  polyethylene glycol 3350 17 Gram(s) Oral daily  senna 2 Tablet(s) Oral at bedtime    MEDICATIONS  (PRN):  acetaminophen   Tablet .. 975 milliGRAM(s) Oral every 6 hours PRN Mild Pain (1 - 3)  HYDROmorphone  Injectable 0.5 milliGRAM(s) IV Push every 4 hours PRN Severe Pain (7 - 10)  oxyCODONE    IR 5 milliGRAM(s) Oral every 6 hours PRN Moderate Pain (4 - 6)  oxyCODONE    IR 10 milliGRAM(s) Oral every 4 hours PRN Severe Pain (7 - 10)      PHYSICAL EXAM:    Vital Signs Last 24 Hrs  T(C): 37.7 (22 Feb 2021 12:02), Max: 37.7 (22 Feb 2021 12:02)  T(F): 99.9 (22 Feb 2021 12:02), Max: 99.9 (22 Feb 2021 12:02)  HR: 104 (22 Feb 2021 12:02) (47 - 134)  BP: 99/54 (22 Feb 2021 12:00) (96/58 - 113/59)  BP(mean): 69 (22 Feb 2021 12:00) (69 - 75)  RR: 21 (22 Feb 2021 12:00) (14 - 26)  SpO2: 96% (22 Feb 2021 12:02) (95% - 100%)    General: intubated in no acute distress     Karnofsky:  20 %    HEENT: ET tube    Lungs: comfortable     CV: normal      GI: normal     : normal huynh     MSK: weakness. s/p right AKA     Skin: no rash    LABS:                      8.2    11.46 )-----------( 304      ( 22 Feb 2021 05:56 )             26.4     02-22    136  |  101  |  19.0  ----------------------------<  116<H>  4.6   |  27.0  |  0.60    Ca    8.1<L>      22 Feb 2021 05:56  Phos  2.7     02-22  Mg     2.0     02-22    TPro  5.4<L>  /  Alb  2.5<L>  /  TBili  0.6  /  DBili  x   /  AST  105<H>  /  ALT  13  /  AlkPhos  62  02-21    PT/INR - ( 21 Feb 2021 17:47 )   PT: 14.6 sec;   INR: 1.27 ratio       PTT - ( 22 Feb 2021 12:42 )  PTT:72.0 sec    I&O's Summary    21 Feb 2021 07:01  -  22 Feb 2021 07:00  --------------------------------------------------------  IN: 1256.4 mL / OUT: 1240 mL / NET: 16.4 mL    22 Feb 2021 07:01  -  22 Feb 2021 13:02  --------------------------------------------------------  IN: 649.5 mL / OUT: 150 mL / NET: 499.5 mL    RADIOLOGY & ADDITIONAL STUDIES:    ADVANCE DIRECTIVES: DNR

## 2021-02-22 NOTE — PROGRESS NOTE ADULT - ASSESSMENT
Assessment  s/p amputation  resp failure post op , component of HFpEF ? PNA ? demand ischemia  Normal EF  mod MR mild AS  Known single vessel CAD s/p PCI with closure medically managed  BAseline BBB      Rec'  keep I < O  diureses as needed  repeat trop, if trending upward consider heparin or lovenox  cont lopressor asa statin  BNP, if elevated standing IV lasix  will follow

## 2021-02-22 NOTE — PROGRESS NOTE ADULT - SUBJECTIVE AND OBJECTIVE BOX
General Surgery Progress Note  Patient taken to the OR yesterday for emergent right AKA and ligation of thrombosed femoral-popliteal bypass graft. Kept intubated postop and taken back to SICU where patient failed attempt at extubation, required atropine for bradycardia. SICU team w/ concern for anisicoria and lateralizing signs for possible stroke. CT noncon head performed and no significant hemorrhage noted.       Medications:   atorvastatin 80 milliGRAM(s) Oral at bedtime  chlorhexidine 0.12% Liquid 15 milliLiter(s) Oral Mucosa every 12 hours  chlorhexidine 2% Cloths 1 Application(s) Topical <User Schedule>  dextrose 40% Gel 15 Gram(s) Oral once  dextrose 5%. 1000 milliLiter(s) (100 mL/Hr) IV Continuous <Continuous>  dextrose 50% Injectable 25 Gram(s) IV Push once  dextrose 50% Injectable 12.5 Gram(s) IV Push once  dextrose 50% Injectable 25 Gram(s) IV Push once  fenofibrate Tablet 145 milliGRAM(s) Oral daily  fentaNYL   Infusion 0.5 MICROgram(s)/kG/Hr (4.83 mL/Hr) IV Continuous <Continuous>  glucagon  Injectable 1 milliGRAM(s) IntraMuscular once  heparin  Infusion. 1300 Unit(s)/Hr (13 mL/Hr) IV Continuous <Continuous>  insulin glargine Injectable (LANTUS) 20 Unit(s) SubCutaneous at bedtime  insulin lispro (ADMELOG) corrective regimen sliding scale   SubCutaneous every 4 hours  ipratropium    for Nebulization 500 MICROGram(s) Nebulizer every 6 hours  levalbuterol Inhalation 1.25 milliGRAM(s) Inhalation every 6 hours  levothyroxine 88 MICROGram(s) Oral daily  melatonin 3 milliGRAM(s) Oral at bedtime  metoprolol tartrate 25 milliGRAM(s) Enteral Tube every 6 hours  multiple electrolytes Injection Type 1 500 milliLiter(s) (50 mL/Hr) IV Continuous <Continuous>  polyethylene glycol 3350 17 Gram(s) Oral daily  senna 2 Tablet(s) Oral at bedtime    acetaminophen   Tablet .. 975 milliGRAM(s) Oral every 6 hours PRN Mild Pain (1 - 3)  HYDROmorphone  Injectable 0.5 milliGRAM(s) IV Push every 4 hours PRN Breakthrough pain  oxyCODONE    IR 5 milliGRAM(s) Oral every 6 hours PRN Moderate Pain (4 - 6)      Objective:   T(F): 98.4 (02-22 @ 00:13), Max: 98.4 (02-22 @ 00:13)  HR: 82 (02-22 @ 02:00) (47 - 134)  BP: --  BP(mean): --  ABP: 121/49 (02-22 @ 02:00) (55/31 - 183/84)  ABP(mean): 73 (02-22 @ 02:00) (51 - 123)  RR: 17 (02-22 @ 02:00) (14 - 26)  SpO2: 100% (02-22 @ 02:00) (95% - 100%)  Mode: AC/ CMV (Assist Control/ Continuous Mandatory Ventilation), RR (machine): 16, TV (machine): 400, FiO2: 50, PEEP: 8, MAP: 12, PIP: 26    Physical Exam:   GEN: Intubated, sedated, elderly female.  RESP: ETT in place on ventillator   CVS: RRR  GI: Abdomen soft, non-tender, non-distended, no rebound tenderness / guarding  Vasc: RLE stump dressing inact    I&O's    02-20 @ 07:01  -  02-21 @ 07:00  --------------------------------------------------------  IN:    Albumin 5%  - 250 mL: 250 mL    Heparin Infusion: 335 mL    IV PiggyBack: 100 mL    IV PiggyBack: 340 mL    multiple electrolytes Injection Type 1.: 75 mL    Oral Fluid: 270 mL  Total IN: 1370 mL    OUT:    Indwelling Catheter - Urethral (mL): 4170 mL  Total OUT: 4170 mL    Total NET: -2800 mL      02-21 @ 07:01  -  02-22 @ 03:14  --------------------------------------------------------  IN:    FentaNYL: 4.9 mL    FentaNYL: 77.6 mL    FentaNYL: 4.8 mL    Glucerna 1.5: 40 mL    Heparin Infusion: 52 mL    Heparin Infusion: 28 mL    Heparin Infusion: 52 mL    multiple electrolytes Injection Type 1.: 300 mL  Total IN: 559.3 mL    OUT:    Indwelling Catheter - Urethral (mL): 990 mL  Total OUT: 990 mL    Total NET: -430.7 mL          LABS:                        8.9    12.32 )-----------( 303      ( 22 Feb 2021 00:16 )             27.2     02-21    139  |  102  |  18.0  ----------------------------<  132<H>  4.4   |  26.0  |  0.64    Ca    7.9<L>      21 Feb 2021 14:40  Phos  3.4     02-21  Mg     2.1     02-21    TPro  5.4<L>  /  Alb  2.5<L>  /  TBili  0.6  /  DBili  x   /  AST  105<H>  /  ALT  13  /  AlkPhos  62  02-21    PT/INR - ( 21 Feb 2021 17:47 )   PT: 14.6 sec;   INR: 1.27 ratio         PTT - ( 21 Feb 2021 23:51 )  PTT:27.0 sec      MICROBIOLOGY:     Culture - Blood (collected 02-18 @ 05:31)  Source: .Blood Blood  Preliminary Report (02-20 @ 07:01):    No growth at 48 hours        PATHOLOGY:  Surgical Pathology Report:   ACCESSION No:  [id number]  JAMAAL HERR                          1        Surgical Final Report          Final Diagnosis    Plaque, femoral artery, endarterectomy  - Calcified atherosclerotic plaque    Verified by: Buzz Frank M.D.  (Electronic Signature)  Reported on: 02/20/21 15:37 [facility], [facility],  [address] [place] [address]  Phone: [phone]   Fax: [phone]  _________________________________________________________________    Clinical History  Peripheral artery disease    Specimen(s) Submitted  1     Femoral plaque    Gross Description  The specimen is received fresh and subsequently placed in  formalin, labeled with the patients name, medical record number  and designated: Femoral plaque. It consists of a 1.5 x 1.5 x 0.2  cm aggregate of tan-brown calcified tissue consistent with a  plaque. Entirely submitted in one cassette following  decalcification  Trever Evans 02/17/2021 08:37    Disclaimer  Histological Processing Performed at [facility],  Department of Pathology, [address] [place] [address].      Surgical Consult Report          Disclaimer  Histological Processing Performed at [facility],  Department of Pathology, [address] [place] [address]. (02-16-21 @ 12:00)

## 2021-02-22 NOTE — PROGRESS NOTE ADULT - ASSESSMENT
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     - RLE dressing down on POD3  - cont heparin gtt  - appreciate ICU level of care.

## 2021-02-22 NOTE — PROGRESS NOTE ADULT - SUBJECTIVE AND OBJECTIVE BOX
INTERVAL HPI/OVERNIGHT EVENTS/SUBJECTIVE: Had increased rest pain to leg yesterday.  Taken to OR for AKA and received 1 unit of blood.  Was not extubated after OR and brought back to ICU on vent. Extubated in SICU but immediately re-intubated.  Please see yesterday's note at 15:58.    Last night was able to open eye, follow basic commands but nurse noted L pupil larger and less reactive.  Daughter could not confirm this as baseline over phone so I held Heparin drip and had STAT Head CT Done which was non-acute.   Now Pt has only CO of pain to throat and mildly to leg when specifically asked and nods head yes.  Difficult to get full ROS due to intubation and sedation but nods head "No" to SOB, CP, Ab pain, HA or other CO.    ICU Vital Signs Last 24 Hrs  T(C): 36.9 (22 Feb 2021 00:13), Max: 36.9 (22 Feb 2021 00:13)  T(F): 98.4 (22 Feb 2021 00:13), Max: 98.4 (22 Feb 2021 00:13)  HR: 80 (22 Feb 2021 01:00) (47 - 134)  BP: --  BP(mean): --  ABP: 117/54 (22 Feb 2021 01:00) (55/31 - 183/84)  ABP(mean): 76 (22 Feb 2021 01:00) (51 - 123)  RR: 18 (22 Feb 2021 01:00) (14 - 26)  SpO2: 100% (22 Feb 2021 01:00) (95% - 100%)      I&O's Detail    20 Feb 2021 07:01  -  21 Feb 2021 07:00  --------------------------------------------------------  IN:    Albumin 5%  - 250 mL: 250 mL    Heparin Infusion: 335 mL    IV PiggyBack: 100 mL    IV PiggyBack: 340 mL    multiple electrolytes Injection Type 1.: 75 mL    Oral Fluid: 270 mL  Total IN: 1370 mL    OUT:    Indwelling Catheter - Urethral (mL): 4170 mL  Total OUT: 4170 mL    Total NET: -2800 mL      21 Feb 2021 07:01  -  22 Feb 2021 02:34  --------------------------------------------------------  IN:    FentaNYL: 4.9 mL    FentaNYL: 77.6 mL    Glucerna 1.5: 30 mL    Heparin Infusion: 39 mL    Heparin Infusion: 28 mL    Heparin Infusion: 52 mL    multiple electrolytes Injection Type 1.: 300 mL  Total IN: 531.5 mL    OUT:    Indwelling Catheter - Urethral (mL): 955 mL  Total OUT: 955 mL    Total NET: -423.5 mL          Mode: AC/ CMV (Assist Control/ Continuous Mandatory Ventilation)  RR (machine): 16  TV (machine): 400  FiO2: 50  PEEP: 8  MAP: 12  PIP: 26    ABG - ( 21 Feb 2021 18:08 )  pH, Arterial: 7.38  pH, Blood: x     /  pCO2: 48    /  pO2: 127   / HCO3: 27    / Base Excess: 3.0   /  SaO2: 99                  MEDICATIONS  (STANDING):  atorvastatin 80 milliGRAM(s) Oral at bedtime  chlorhexidine 0.12% Liquid 15 milliLiter(s) Oral Mucosa every 12 hours  chlorhexidine 2% Cloths 1 Application(s) Topical <User Schedule>  dextrose 40% Gel 15 Gram(s) Oral once  dextrose 5%. 1000 milliLiter(s) (100 mL/Hr) IV Continuous <Continuous>  dextrose 50% Injectable 25 Gram(s) IV Push once  dextrose 50% Injectable 12.5 Gram(s) IV Push once  dextrose 50% Injectable 25 Gram(s) IV Push once  fenofibrate Tablet 145 milliGRAM(s) Oral daily  fentaNYL   Infusion 1 MICROgram(s)/kG/Hr (9.65 mL/Hr) IV Continuous <Continuous>  glucagon  Injectable 1 milliGRAM(s) IntraMuscular once  heparin  Infusion. 1300 Unit(s)/Hr (13 mL/Hr) IV Continuous <Continuous>  insulin glargine Injectable (LANTUS) 20 Unit(s) SubCutaneous at bedtime  insulin lispro (ADMELOG) corrective regimen sliding scale   SubCutaneous every 4 hours  ipratropium    for Nebulization 500 MICROGram(s) Nebulizer every 6 hours  levalbuterol Inhalation 1.25 milliGRAM(s) Inhalation every 6 hours  levothyroxine 88 MICROGram(s) Oral daily  melatonin 3 milliGRAM(s) Oral at bedtime  metoprolol tartrate 25 milliGRAM(s) Enteral Tube every 6 hours  multiple electrolytes Injection Type 1 500 milliLiter(s) (50 mL/Hr) IV Continuous <Continuous>  polyethylene glycol 3350 17 Gram(s) Oral daily  senna 2 Tablet(s) Oral at bedtime    MEDICATIONS  (PRN):  acetaminophen   Tablet .. 975 milliGRAM(s) Oral every 6 hours PRN Mild Pain (1 - 3)  HYDROmorphone  Injectable 0.5 milliGRAM(s) IV Push every 4 hours PRN Breakthrough pain  oxyCODONE    IR 5 milliGRAM(s) Oral every 6 hours PRN Moderate Pain (4 - 6)      NUTRITION/IVF: Glucerna @75      PHYSICAL EXAM:     Gen: NAD, On Vent. No cyanosis, Pallor.    Eyes: PERRL ~ 3mm, EOMI,     Neurological: A&Ox3, GCS 15, No focal deficit.     ENMT: Clear canals, clear throat.      Neck: Supple. NT AT, FROM no pain.  No JVD. No meningeal signs    Pulmonary: NAD, CTA, = BL .      Cardiovascular: RRR, S1, S2, No Murmurs, rubs or gallops noted.    Gastrointestinal: ND, Soft, NT.    Extremities: R AKA.  CDI.     Neuro: RASS opens eyes but does not maintain eye contact >10 seconds. Intermittently follows commands.         LABS:  CBC Full  -  ( 22 Feb 2021 00:16 )  WBC Count : 12.32 K/uL  RBC Count : 2.89 M/uL  Hemoglobin : 8.9 g/dL  Hematocrit : 27.2 %  Platelet Count - Automated : 303 K/uL  Mean Cell Volume : 94.1 fl  Mean Cell Hemoglobin : 30.8 pg  Mean Cell Hemoglobin Concentration : 32.7 gm/dL  Auto Neutrophil # : x  Auto Lymphocyte # : x  Auto Monocyte # : x  Auto Eosinophil # : x  Auto Basophil # : x  Auto Neutrophil % : x  Auto Lymphocyte % : x  Auto Monocyte % : x  Auto Eosinophil % : x  Auto Basophil % : x    02-21    139  |  102  |  18.0  ----------------------------<  132<H>  4.4   |  26.0  |  0.64    Ca    7.9<L>      21 Feb 2021 14:40  Phos  3.4     02-21  Mg     2.1     02-21    TPro  5.4<L>  /  Alb  2.5<L>  /  TBili  0.6  /  DBili  x   /  AST  105<H>  /  ALT  13  /  AlkPhos  62  02-21    PT/INR - ( 21 Feb 2021 17:47 )   PT: 14.6 sec;   INR: 1.27 ratio         PTT - ( 21 Feb 2021 23:51 )  PTT:27.0 sec    RECENT CULTURES:  02-18 .Blood Blood XXXX XXXX   No growth at 48 hours        LIVER FUNCTIONS - ( 21 Feb 2021 10:58 )  Alb: 2.5 g/dL / Pro: 5.4 g/dL / ALK PHOS: 62 U/L / ALT: 13 U/L / AST: 105 U/L / GGT: x           CARDIAC MARKERS ( 21 Feb 2021 17:46 )  x     / 0.67 ng/mL / x     / x     / x      CARDIAC MARKERS ( 21 Feb 2021 16:32 )  x     / x     / x     / x     / 12.7 ng/mL  CARDIAC MARKERS ( 21 Feb 2021 14:40 )  x     / x     / 4191 U/L / x     / x      CARDIAC MARKERS ( 21 Feb 2021 10:58 )  x     / 0.62 ng/mL / x     / x     / x      CARDIAC MARKERS ( 21 Feb 2021 03:44 )  x     / 0.78 ng/mL / x     / x     / x      CARDIAC MARKERS ( 20 Feb 2021 20:21 )  x     / 0.57 ng/mL / x     / x     / x      CARDIAC MARKERS ( 20 Feb 2021 11:43 )  x     / 0.50 ng/mL / x     / x     / x      CARDIAC MARKERS ( 20 Feb 2021 04:21 )  x     / 0.44 ng/mL / x     / x     / x          CAPILLARY BLOOD GLUCOSE      RADIOLOGY & ADDITIONAL STUDIES:    ASSESSMENT/PLAN:  80yFemale presenting with: R leg critical limb ischemia now SP AKA, DM with labile blood sugars Hyperglycemia, hyperkalemia, NSTEMI, Acute hypoxic respiratory failure possibly from persistent sedation?    Neurological: I will wean Fentanyl now to allow pt to wake up more then perform SAT.     Pulmonary: As pt awakens, I will place pt on SBT Today.  Unclear exactly of cause of need for re-intubation.  Described as apnea very soon after extubation.  I suspect oversedation.  Not described as airway edema by exam described to me by previous team or by scenario of ease of re-intubation.  I would not start steroids and I would consider extubation if pt meets RSBI, T-piece, low secretion, good core strength criteria.     Cardiovascular: I will give BB as long as SBP >100 and HR > 60.  Cards rec appreciated.  Likely Type 2 ischemia causing troponin elevation which has peaked.  Would continue therapeutic Heparin drip, Statin. Further plan per Cardio.  My suspicion is that the bradycardia prior to re-intubation was related to hypoxia.    Gastrointestinal: I have ordered TF to goal. If meets criteria for extubation, would place to suction.    Genitourinary: Monitor Output. Doubtful cause of respiratory failure is due to volume overload.  I would not plan to give Lasix in the setting of borderline BP at this time.     Heme: Transfuse if hgb <7.  CW Therapeutic heparin drip at prior therapeutic rate and serial PTT     ID: None    Skin: Dressing per vascular team    Lines/ Tubes: Plan as above. Pt critically ill.  Requires all invasive lines and tubes    Dispo: Organ support via vent.      CRITICAL CARE TIME SPENT: Critical care time spent: 60 minutes of critical care time spent providing medical care for patient's acute illness/conditions that impairs at least one vital organ system and/or poses a high risk of imminent or life threatening deterioration in the patient's condition. It includes time spent evaluating and treating the patient's acute illness as well as time spent reviewing labs, radiology, discussing goals of care with patient and/or patient's family, and discussing the case with a multidisciplinary team in an effort to prevent further life threatening deterioration or end organ damage. This time is independent of any procedures performed.

## 2021-02-22 NOTE — PROGRESS NOTE ADULT - SUBJECTIVE AND OBJECTIVE BOX
Pt seen and examined. S/p emergent OR yesterday for R AKA. Pt re-intubated postoperatively. Today doing betted. HD stable. On minimal vent settings    Vital Signs Last 24 Hrs  T(C): 37.4 (22 Feb 2021 08:00), Max: 37.4 (22 Feb 2021 08:00)  T(F): 99.3 (22 Feb 2021 08:00), Max: 99.3 (22 Feb 2021 08:00)  HR: 85 (22 Feb 2021 09:53) (47 - 134)  BP: 102/59 (22 Feb 2021 09:00) (102/59 - 113/59)  BP(mean): 72 (22 Feb 2021 09:00) (72 - 75)  RR: 22 (22 Feb 2021 09:00) (14 - 26)  SpO2: 96% (22 Feb 2021 09:53) (95% - 100%)    PE   R AKA stump dressing dry  L foot perfused.    A/P Stable  - extubate as per SICU  - Will follow along

## 2021-02-22 NOTE — PROGRESS NOTE ADULT - ASSESSMENT
80F with DM, CAD, admitted with recurrent cellultis, found to have severe PVD, s/p right femoral artery endarterectomy, ax-fem, fem pop bypass, with thrombosed right subclavian graft, now s/p OR for removal of femoral artery bypass and R AKA.     #1 RLE pain - managed on current regimen with neurontin and PO oxycodone, post operatively will likely need maintenance meds as well.   #2 Shock - possible cardiac - wean pressors as tolerated. normal echo. cardio following.   #3 thrombosed graft - possible thrombectomy if continues to be medically stable.   #4 Palliative care encounter - patient intubated post OR. will continue to follow pending clinical progress.  80F with DM, CAD, admitted with recurrent cellultis, found to have severe PVD, s/p right femoral artery endarterectomy, ax-fem, fem pop bypass, with thrombosed right subclavian graft, now s/p OR for removal of femoral artery bypass and R AKA.     #1 RLE pain - managed on current regimen with neurontin and PO oxycodone, post operatively will likely need maintenance meds as well.   #2 Shock - possible cardiac - wean pressors as tolerated. normal echo. cardio following.   #3 thrombosed graft - s/p OR for removal of bypass and right AKA.   #4 Palliative care encounter - patient intubated post OR. will continue to follow pending clinical progress.

## 2021-02-22 NOTE — PROGRESS NOTE ADULT - ATTENDING COMMENTS
acute respiratory failure with hypoxemia  metabolic alkalosis  -16 / 400 / 50% / +8  =>  7.47 / 42 / 92 / 31  -on spont 8 / +8 / 40%  ---RR = 20 /min  ---TV = 380 mL  ---MVe = 7.5 lpm  ---SpO2 = 96%  ---EtCO2 = 27    -T-piece trial      type 2 MI (demand ischemia)  -trop = 0.67 (2/21)    noncardiogenic pulmonary edema  CXR - diffuse bilateral infiltrates with small bilateral pleural effusions  -Lasix 20 mg IV  -check BNP    inadequate oral malnutrition  -Glucerna 1.5 @ 35 mL/hr  -had BM yesterday acute respiratory failure with hypoxemia  metabolic alkalosis  -16 / 400 / 50% / +8  =>  7.47 / 42 / 92 / 31  -on spont 8 / +8 / 40%  ---RR = 20 /min  ---TV = 380 mL  ---MVe = 7.5 lpm  ---SpO2 = 96%  ---EtCO2 = 27    -T-piece trial      type 2 MI (demand ischemia)  -trop = 0.67 (2/21)    acute noncardiogenic pulmonary edema  CXR - diffuse bilateral infiltrates with small bilateral pleural effusions  -Lasix 20 mg IV  -check BNP    inadequate oral malnutrition  -Glucerna 1.5 @ 35 mL/hr  -had BM yesterday

## 2021-02-23 NOTE — PROGRESS NOTE ADULT - ATTENDING COMMENTS
acute respiratory failure with hypoxemia  metabolic alkalosis  -16 / 400 / 40% / +8  =>  7.49 / 40 / 84 / 30  -on spont 10 / +8 / 40%  ---RR = 26 /min  ---TV = 400 mL  ---MVe = 8.6 lpm  ---SpO2 = 99%  ---EtCO2 = 32    -didn't do T-piece trial yesterday since she had tachypnea      type 2 MI (demand ischemia)  concern for acute CHF  CXR - diffuse bilateral infiltrates with small bilateral pleural effusions  -trop = 0.67 (2/21 1746) -> ... -> 1.10 (2/23 0432) -> 1.05 (2/23 0913)  -BNP = 3099 (2/18 0828) -> 4506 (2/22 1529) -> 5408 (2/23 0432)  -continue diuresis with Lasix IV  -f/u TTE (2/23)    inadequate oral malnutrition  -Glucerna 1.5 @ 35 mL/hr  -had BM this morning acute respiratory failure with hypoxemia  metabolic alkalosis  -16 / 400 / 40% / +8  =>  7.49 / 40 / 84 / 30  -on spont 10 / +8 / 40%  ---RR = 26 /min  ---TV = 400 mL  ---MVe = 8.6 lpm  ---SpO2 = 99%  ---EtCO2 = 32    -didn't do T-piece trial yesterday since she had tachypnea      type 2 MI (demand ischemia)  concern for acute CHF  CXR - diffuse bilateral infiltrates with small bilateral pleural effusions  -trop = 0.67 (2/21 1746) -> ... -> 1.10 (2/23 0432) -> 1.05 (2/23 0913)  -BNP = 3099 (2/18 0828) -> 4506 (2/22 1529) -> 5408 (2/23 0432)  -continue diuresis with Lasix IV  -f/u TTE (2/23)    fever  -Tm = 38.1  -WBC = 12  -sputum Cx (2/22) - negative gram stain  -check BCx  -check U/A  -check procalcitonin  -hold off on restarting ABx    inadequate oral malnutrition  -Glucerna 1.5 @ 35 mL/hr  -had BM this morning

## 2021-02-23 NOTE — PROGRESS NOTE ADULT - SUBJECTIVE AND OBJECTIVE BOX
24h Events:  Fentanyl drip DCd in favor of a prn regimen. Tolerated PSV throughout the day. Initially thought to be volume depleted and was given a 500cc bolus but on re-evaluation fluid overload was suspected so lasix was given x1. Troponins continue to trend up since re-intubation.       ICU Vital Signs Last 24 Hrs  T(C): 37.9 (22 Feb 2021 23:11), Max: 38.1 (22 Feb 2021 20:00)  T(F): 100.2 (22 Feb 2021 23:11), Max: 100.6 (22 Feb 2021 20:00)  HR: 70 (23 Feb 2021 00:22) (70 - 112)  BP: 105/53 (22 Feb 2021 22:00) (85/39 - 113/59)  BP(mean): 67 (22 Feb 2021 22:00) (53 - 85)  ABP: 86/41 (23 Feb 2021 00:00) (86/41 - 185/89)  ABP(mean): 57 (23 Feb 2021 00:00) (57 - 122)  RR: 22 (23 Feb 2021 00:00) (19 - 25)  SpO2: 98% (23 Feb 2021 00:22) (94% - 100%)      ABG - ( 22 Feb 2021 04:14 )  pH, Arterial: 7.47  pH, Blood: x     /  pCO2: 42    /  pO2: 92    / HCO3: 31    / Base Excess: 6.7   /  SaO2: 98                  MEDICATIONS  (STANDING):  atorvastatin 80 milliGRAM(s) Oral at bedtime  chlorhexidine 0.12% Liquid 15 milliLiter(s) Oral Mucosa every 12 hours  chlorhexidine 2% Cloths 1 Application(s) Topical <User Schedule>  dexMEDEtomidine Infusion 0.3 MICROgram(s)/kG/Hr (7.24 mL/Hr) IV Continuous <Continuous>  dextrose 40% Gel 15 Gram(s) Oral once  dextrose 5%. 1000 milliLiter(s) (100 mL/Hr) IV Continuous <Continuous>  dextrose 50% Injectable 25 Gram(s) IV Push once  dextrose 50% Injectable 12.5 Gram(s) IV Push once  dextrose 50% Injectable 25 Gram(s) IV Push once  fenofibrate Tablet 145 milliGRAM(s) Oral daily  glucagon  Injectable 1 milliGRAM(s) IntraMuscular once  heparin  Infusion. 1300 Unit(s)/Hr (13 mL/Hr) IV Continuous <Continuous>  insulin glargine Injectable (LANTUS) 32 Unit(s) SubCutaneous at bedtime  insulin lispro (ADMELOG) corrective regimen sliding scale   SubCutaneous every 4 hours  levothyroxine 88 MICROGram(s) Oral daily  melatonin 3 milliGRAM(s) Oral at bedtime  metoprolol tartrate 25 milliGRAM(s) Enteral Tube every 6 hours  norepinephrine Infusion 0.05 MICROgram(s)/kG/Min (9.05 mL/Hr) IV Continuous <Continuous>  polyethylene glycol 3350 17 Gram(s) Oral daily  senna 2 Tablet(s) Oral at bedtime    MEDICATIONS  (PRN):  acetaminophen   Tablet .. 975 milliGRAM(s) Oral every 6 hours PRN Mild Pain (1 - 3)  HYDROmorphone  Injectable 0.5 milliGRAM(s) IV Push every 4 hours PRN Severe Pain (7 - 10)  oxyCODONE    IR 5 milliGRAM(s) Oral every 6 hours PRN Moderate Pain (4 - 6)  oxyCODONE    IR 10 milliGRAM(s) Oral every 4 hours PRN Severe Pain (7 - 10)          Physical Exam:    Gen: Resting comfortably in bed, intubated    HEENT: PERRL, EOMI    Neurological: Following simple commands, no focal deficit, RASS -1     Neck: Trachea midline, no evidence of JVD, FROM without pain, neck symmetric    Pulmonary: CTAB with decreased breath sounds at the bases    Cardiovascular: S1S2    Gastrointestinal: morbidly obese, non-tender    : Basilio in place draining yellow urine with small clots    Skin: Dressing in place on right anterior chest which is C/D, right groin wound well approximated with skin clips and no evidence of infection, R limb remnant with coban dressing    Musculoskeletal: FROM without pain, no deformity or areas of tenderness    LABS:  Pending      ASSESSMENT/PLAN:  80y Female hypoxic respiratory failure, NSTEMI s/p R ax-fem bypass, fem-pop bypass and subsequent AKA    Neuro: goal RASS -1 to 0, continue current regimen    CV: Continue to trend troponins. Given albumin for hypotension while sleeping.     Pulm: Continue on ventilator, PSV as tolerated. Likely not an extubation candidate until cardiac enzymes plateau     GI/Nutrition: Tube feedings at goal    /Renal: Basilio for strict I&O, monitor for worsening of hematuria, f/u am labs    ID: Antibiotic course complete    Endo: Lantus, ISS    Skin: Repositioning for DTI prevention while in bed    Heme/DVT Prophylaxis: SCDs, heparin gtt    Lines/Tubes: Continue invasive lines    Dispo: Continue in SICU

## 2021-02-23 NOTE — PROGRESS NOTE ADULT - SUBJECTIVE AND OBJECTIVE BOX
[facility] - [facility], THE HEART CENTER                                   [address] [place] [address]                                                      PHONE: [phone]                                                         FAX: [phone]  [email or web address]  ---------------------------------------------------------------------------------------------------------------------------------    Overnight events/patient complaints: events noted, trop elevation, BNP elevated, CXR c/w CHF, on low dose levo, still vented, no acute ECG changes      No Known Allergies    MEDICATIONS  (STANDING):  atorvastatin 80 milliGRAM(s) Oral at bedtime  chlorhexidine 0.12% Liquid 15 milliLiter(s) Oral Mucosa every 12 hours  chlorhexidine 2% Cloths 1 Application(s) Topical <User Schedule>  dexMEDEtomidine Infusion 0.3 MICROgram(s)/kG/Hr (7.24 mL/Hr) IV Continuous <Continuous>  dextrose 40% Gel 15 Gram(s) Oral once  dextrose 5%. 1000 milliLiter(s) (100 mL/Hr) IV Continuous <Continuous>  dextrose 50% Injectable 25 Gram(s) IV Push once  dextrose 50% Injectable 12.5 Gram(s) IV Push once  dextrose 50% Injectable 25 Gram(s) IV Push once  fenofibrate Tablet 145 milliGRAM(s) Oral daily  glucagon  Injectable 1 milliGRAM(s) IntraMuscular once  heparin  Infusion. 1300 Unit(s)/Hr (13 mL/Hr) IV Continuous <Continuous>  insulin glargine Injectable (LANTUS) 32 Unit(s) SubCutaneous at bedtime  insulin lispro (ADMELOG) corrective regimen sliding scale   SubCutaneous every 4 hours  levothyroxine 88 MICROGram(s) Oral daily  melatonin 3 milliGRAM(s) Oral at bedtime  metoprolol tartrate 25 milliGRAM(s) Enteral Tube every 6 hours  norepinephrine Infusion 0.05 MICROgram(s)/kG/Min (9.05 mL/Hr) IV Continuous <Continuous>  polyethylene glycol 3350 17 Gram(s) Oral daily  senna 2 Tablet(s) Oral at bedtime    MEDICATIONS  (PRN):  acetaminophen   Tablet .. 975 milliGRAM(s) Oral every 6 hours PRN Mild Pain (1 - 3)  HYDROmorphone  Injectable 0.5 milliGRAM(s) IV Push every 4 hours PRN Severe Pain (7 - 10)  oxyCODONE    IR 5 milliGRAM(s) Oral every 6 hours PRN Moderate Pain (4 - 6)  oxyCODONE    IR 10 milliGRAM(s) Oral every 4 hours PRN Severe Pain (7 - 10)      Vital Signs Last 24 Hrs  T(C): 38 (2021 08:00), Max: 38.1 (2021 20:00)  T(F): 100.4 (2021 08:00), Max: 100.6 (2021 20:00)  HR: 76 (2021 09:00) (70 - 109)  BP: 91/57 (2021 07:00) (76/44 - 112/55)  BP(mean): 69 (2021 07:00) (53 - 85)  RR: 15 (2021 09:00) (9 - 29)  SpO2: 96% (2021 09:00) (93% - 100%)  Daily     Daily Weight in k (2021 05:33)  ICU Vital Signs Last 24 Hrs  JAMAAL HERR  I&O's Detail    2021 07:01  -  2021 07:00  --------------------------------------------------------  IN:    Albumin 5%  - 250 mL: 250 mL    Dexmedetomidine: 297.5 mL    Enteral Tube Flush: 320 mL    FentaNYL: 29 mL    Glucerna 1.5: 840 mL    Heparin Infusion: 360 mL    multiple electrolytes Injection Type 1.: 350 mL    Norepinephrine: 10.8 mL  Total IN: 2457.3 mL    OUT:    Indwelling Catheter - Urethral (mL): 1080 mL  Total OUT: 1080 mL    Total NET: 1377.3 mL      2021 07:01  -  2021 09:12  --------------------------------------------------------  IN:    Dexmedetomidine: 12.1 mL    Glucerna 1.5: 70 mL    Heparin Infusion: 15 mL  Total IN: 97.1 mL    OUT:    Indwelling Catheter - Urethral (mL): 60 mL  Total OUT: 60 mL    Total NET: 37.1 mL        I&O's Summary    2021 07:  -  2021 07:00  --------------------------------------------------------  IN: 2457.3 mL / OUT: 1080 mL / NET: 1377.3 mL    2021 07:01  -  2021 09:12  --------------------------------------------------------  IN: 97.1 mL / OUT: 60 mL / NET: 37.1 mL      Drug Dosing Weight  [facility]  Mode: AC/ CMV (Assist Control/ Continuous Mandatory Ventilation), RR (machine): 16, TV (machine): 400, FiO2: 40, PEEP: 8, MAP: 14, PIP: 29    PHYSICAL EXAM:  General: Appears well developed, well nourished alert and cooperative.  HEENT: Head; normocephalic, atraumatic.  Eyes: Pupils reactive, cornea wnl.  Neck: Supple, no nodes adenopathy, no NVD or carotid bruit or thyromegaly.  CARDIOVASCULAR: Normal S1 and S2, No murmur, rub, gallop or lift.   LUNGS: No rales, rhonchi or wheeze. Normal breath sounds bilaterally.  ABDOMEN: Soft, nontender without mass or organomegaly. bowel sounds normoactive.  EXTREMITIES: No clubbing, cyanosis or edema. Distal pulses wnl.   SKIN: warm and dry with normal turgor.  NEURO: Alert/oriented x 3/normal motor exam. No pathologic reflexes.    PSYCH: normal affect.        LABS:                        7.8    12.49 )-----------( 321      ( 2021 04:32 )             24.4     02    138  |  102  |  24.0<H>  ----------------------------<  161<H>  4.3   |  28.0  |  0.79    Ca    8.2<L>      2021 04:32  Phos  2.5       Mg     2.0         TPro  5.4<L>  /  Alb  2.5<L>  /  TBili  0.6  /  DBili  x   /  AST  105<H>  /  ALT  13  /  AlkPhos  62  02-    JAMAAL HERR  CARDIAC MARKERS ( 2021 04:32 )  x     / 1.10 ng/mL / x     / x     / x      CARDIAC MARKERS ( 2021 23:53 )  x     / 1.05 ng/mL / x     / x     / x      CARDIAC MARKERS ( 2021 15:29 )  x     / 0.79 ng/mL / x     / x     / x      CARDIAC MARKERS ( 2021 17:46 )  x     / 0.67 ng/mL / x     / x     / x      CARDIAC MARKERS ( 2021 16:32 )  x     / x     / x     / x     / 12.7 ng/mL  CARDIAC MARKERS ( 2021 14:40 )  x     / x     / 4191 U/L / x     / x      CARDIAC MARKERS ( 2021 10:58 )  x     / 0.62 ng/mL / x     / x     / x          PT/INR - ( 2021 17:47 )   PT: 14.6 sec;   INR: 1.27 ratio         PTT - ( 2021 20:26 )  PTT:68.2 sec      RADIOLOGY & ADDITIONAL STUDIES:    INTERPRETATION OF TELEMETRY (personally reviewed):    ECG:    ECHO:< from: TTE Echo Complete w/o Contrast w/ Doppler (21 @ 14:09) >  Summary:   1. Endocardial visualization was enhanced with intravenous echo contrast.   2. Technically difficult study.   3. Normal global left ventricular systolic function.   4. Left ventricular ejection fraction, by visual estimation, is 55 to 60%.   5. Basal and mid inferior wall is abnormal as described above.   6. Normal right ventricular size and function.   7. Mild thickening and calcification of the anterior and posterior mitral valve leaflets.   8. Moderate mitral annular calcification.   9. Moderate to severe mitral valve regurgitation.  10. Sclerotic aortic valve with decreased opening.  11. Poorly visualized mitralvalve with moderate stenosis and regurgitation.    MD Fani Electronically signed on 2021 at 10:22:39 AM          < end of copied text >      STRESS TEST:    CARDIAC CATHETERIZATION:    ASSESSMENT AND PLAN:  In summary, JAMAAL HERR is an 80y Female with past medical history significant for

## 2021-02-23 NOTE — PROGRESS NOTE ADULT - ATTENDING COMMENTS
Pt seen and examined. Agree with above  First dressing change tomorrow  Wean to extubate as tolerated  Wean off pressors as tolerated

## 2021-02-23 NOTE — PROGRESS NOTE ADULT - ASSESSMENT
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     - RLE dressing down on POD3  - cont heparin gtt, therapeutic. Final plan pending discussion with attending physician  - Tube feeds  - Remains intubated until troponins plateau  - appreciate ICU level of care.   - DNR

## 2021-02-23 NOTE — PROGRESS NOTE ADULT - SUBJECTIVE AND OBJECTIVE BOX
INTERVAL HPI/OVERNIGHT EVENTS:    Remains intubated at this time with uptrending troponins. Off pressors at this time. RLE with bandaging in place to be changed by vascular surgery team on POD3. Heparin drip, Precedex, Tube feeds. Remains in stable, but guarded condition.      MEDICATIONS  (STANDING):  atorvastatin 80 milliGRAM(s) Oral at bedtime  chlorhexidine 0.12% Liquid 15 milliLiter(s) Oral Mucosa every 12 hours  chlorhexidine 2% Cloths 1 Application(s) Topical <User Schedule>  dexMEDEtomidine Infusion 0.3 MICROgram(s)/kG/Hr (7.24 mL/Hr) IV Continuous <Continuous>  dextrose 40% Gel 15 Gram(s) Oral once  dextrose 5%. 1000 milliLiter(s) (100 mL/Hr) IV Continuous <Continuous>  dextrose 50% Injectable 25 Gram(s) IV Push once  dextrose 50% Injectable 12.5 Gram(s) IV Push once  dextrose 50% Injectable 25 Gram(s) IV Push once  fenofibrate Tablet 145 milliGRAM(s) Oral daily  glucagon  Injectable 1 milliGRAM(s) IntraMuscular once  heparin  Infusion. 1300 Unit(s)/Hr (13 mL/Hr) IV Continuous <Continuous>  insulin glargine Injectable (LANTUS) 32 Unit(s) SubCutaneous at bedtime  insulin lispro (ADMELOG) corrective regimen sliding scale   SubCutaneous every 4 hours  levothyroxine 88 MICROGram(s) Oral daily  melatonin 3 milliGRAM(s) Oral at bedtime  metoprolol tartrate 25 milliGRAM(s) Enteral Tube every 6 hours  norepinephrine Infusion 0.05 MICROgram(s)/kG/Min (9.05 mL/Hr) IV Continuous <Continuous>  polyethylene glycol 3350 17 Gram(s) Oral daily  senna 2 Tablet(s) Oral at bedtime    MEDICATIONS  (PRN):  acetaminophen   Tablet .. 975 milliGRAM(s) Oral every 6 hours PRN Mild Pain (1 - 3)  HYDROmorphone  Injectable 0.5 milliGRAM(s) IV Push every 4 hours PRN Severe Pain (7 - 10)  oxyCODONE    IR 5 milliGRAM(s) Oral every 6 hours PRN Moderate Pain (4 - 6)  oxyCODONE    IR 10 milliGRAM(s) Oral every 4 hours PRN Severe Pain (7 - 10)      Vital Signs Last 24 Hrs  T(C): 37.2 (23 Feb 2021 02:00), Max: 38.1 (22 Feb 2021 20:00)  T(F): 99 (23 Feb 2021 02:00), Max: 100.6 (22 Feb 2021 20:00)  HR: 77 (23 Feb 2021 02:00) (70 - 112)  BP: 82/50 (23 Feb 2021 02:00) (76/44 - 113/59)  BP(mean): 61 (23 Feb 2021 02:00) (53 - 85)  RR: 22 (23 Feb 2021 02:00) (19 - 26)  SpO2: 96% (23 Feb 2021 02:00) (94% - 100%)    Physical Exam:   GEN: Intubated, sedated, elderly female.  RESP: ETT in place on ventillator   CVS: RRR  GI: Abdomen soft, non-tender, non-distended, no rebound tenderness / guarding  Vasc: RLE stump dressing inact. Left femoral pulse 1+, unable to palpate Right femoral pulse      I&O's Detail    21 Feb 2021 07:01  -  22 Feb 2021 07:00  --------------------------------------------------------  IN:    Dexmedetomidine: 19.2 mL    Enteral Tube Flush: 200 mL    FentaNYL: 29.1 mL    FentaNYL: 4.9 mL    FentaNYL: 77.6 mL    FentaNYL: 9.6 mL    Glucerna 1.5: 180 mL    Heparin Infusion: 52 mL    Heparin Infusion: 28 mL    Heparin Infusion: 106 mL    multiple electrolytes Injection Type 1.: 550 mL  Total IN: 1256.4 mL    OUT:    Indwelling Catheter - Urethral (mL): 1240 mL  Total OUT: 1240 mL    Total NET: 16.4 mL      22 Feb 2021 07:01  -  23 Feb 2021 03:26  --------------------------------------------------------  IN:    Albumin 5%  - 250 mL: 250 mL    Dexmedetomidine: 251.6 mL    Enteral Tube Flush: 220 mL    FentaNYL: 29 mL    Glucerna 1.5: 700 mL    Heparin Infusion: 300 mL    multiple electrolytes Injection Type 1.: 350 mL  Total IN: 2100.6 mL    OUT:    Indwelling Catheter - Urethral (mL): 940 mL  Total OUT: 940 mL    Total NET: 1160.6 mL          LABS:                        8.2    11.46 )-----------( 304      ( 22 Feb 2021 05:56 )             26.4     02-22    136  |  101  |  20.0  ----------------------------<  188<H>  4.8   |  26.0  |  0.62    Ca    7.9<L>      22 Feb 2021 15:29  Phos  2.7     02-22  Mg     2.0     02-22    TPro  5.4<L>  /  Alb  2.5<L>  /  TBili  0.6  /  DBili  x   /  AST  105<H>  /  ALT  13  /  AlkPhos  62  02-21    PT/INR - ( 21 Feb 2021 17:47 )   PT: 14.6 sec;   INR: 1.27 ratio         PTT - ( 22 Feb 2021 20:26 )  PTT:68.2 sec      RADIOLOGY & ADDITIONAL STUDIES:

## 2021-02-23 NOTE — PROGRESS NOTE ADULT - ASSESSMENT
Assessment  NSTEMi postop AKA  CHF ? 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

## 2021-02-24 NOTE — AIRWAY PLACEMENT NOTE ADULT - POST AIRWAY PLACEMENT ASSESSMENT:
breath sounds equal/positive end tidal CO2 noted/CXR pending/chest excursion noted/skin color improved

## 2021-02-24 NOTE — AIRWAY REMOVAL NOTE  ADULT & PEDS - REASON ARTIFICAL AIRWAY REMOVED:
elective ventilator withdrawal
reason for artificial airway has resolved

## 2021-02-24 NOTE — GOALS OF CARE CONVERSATION - ADVANCED CARE PLANNING - CONVERSATION DETAILS
Met with daughter at bedside regarding overall grave prognosis. SHe has met with SICU team, and they have come to the decision for comfort measures and withdrawal of ventilator. She called the family and everyone was able to come see him and say their goodbyes and she is ready to proceed with compassionate extubation. All medications ordered in sunrsie with Juancho RUTLEDGE. A lot of emotional support provided to daughter at bedside. Reassured her in the fact that she is honoring her mother's wishes and it is a very reasonable decision given her medical condition and her prior known wishes.
I called patient's daughter when patient failed trial of extubation.  Patient was stabilized with intubation and daughter was called in. At that time we discussed trach/peg vs. comfort measures. Rachael stated that patient would not want to live like this, that she would not want a tracheostomy and would not want to be discharged to a nursing home.  It was at that time that we discussed comfort measures and symptom management.  Multiple family members were able to visit her and say their good byes. The  was also able to come to patient's bedside. It was at that time that we decided to peruse comfort measures and terminal extubation.
I discussed the extubation post op which required re-intubation.  I explained that there was a pupillary change that required us to perform head CT which resulted in NO bleed.    I explained there was a MOLST with DNR and trial of intubation.    She explained that the pt herself made this decision with another PA and Rachael present.  She agrees that the pt wanted DNR, trial intubation but not prolonged intubation if likely poor neurological outcome.    Pt is DNR with trial of intubation.

## 2021-02-24 NOTE — PROGRESS NOTE ADULT - ATTENDING COMMENTS
acute respiratory failure with hypoxemia  metabolic alkalosis  -16 / 400 / 40% / +8  =>  7.51 / 42 / 78 / 33  -on spont 10 / +8 / 40%  ---RR = 16 /min  ---TV = 480 mL  ---MVe = 6.5 lpm  ---SpO2 = 98%  ---EtCO2 = 42    -T-piece trial since she still has episodes of apnea associated with hypercapnia      type 2 MI (demand ischemia)  concern for acute CHF (EF = 40-45%)  CXR (2/24) - unchanged diffuse bilateral infiltrates with small bilateral pleural effusions  -trop = 0.67 (2/21 1746) -> ... -> 1.10 (2/23 0432) -> 1.05 (2/23 0913) -> 0.90 (2/24 0444)  -BNP = 3099 (2/18 0828) -> 4506 (2/22 1529) -> 5408 (2/23 0432)  -stop trending troponin  -continue diuresis with Lasix IV  -add acetazolamide for metabolic acidosis    fever  -Tm = 38.1, but afeb since 2/23 @ 1200  -WBC = 12 -> 14  -sputum Cx (2/22) - NGTD  -BCx (2/23) - pending x 2 sets  -U/A (2/23) - not consistent with UTI  -procalcitonin (2/23) - 0.25  -hold off on restarting ABx    inadequate oral malnutrition  -Glucerna 1.5 @ 35 mL/hr via OG tube  -had BM yesterday (12/23) acute respiratory failure with hypoxemia  metabolic alkalosis  -16 / 400 / 40% / +8  =>  7.51 / 42 / 78 / 33  -on spont 10 / +8 / 40%  ---RR = 16 /min  ---TV = 480 mL  ---MVe = 6.5 lpm  ---SpO2 = 98%  ---EtCO2 = 42    -since she still has episodes of apnea associated with hypercapnia today, we did a T-piece trial.  -On T-piece trial, EtCO2 = 45 mmHg and no episodes of apnea, so will extubated to NIPPV.      type 2 MI (demand ischemia)  concern for acute CHF (EF = 40-45%)  CXR (2/24) - unchanged diffuse bilateral infiltrates with small bilateral pleural effusions  -trop = 0.67 (2/21 1746) -> ... -> 1.10 (2/23 0432) -> 1.05 (2/23 0913) -> 0.90 (2/24 0444)  -BNP = 3099 (2/18 0828) -> 4506 (2/22 1529) -> 5408 (2/23 0432)  -stop trending troponin  -continue diuresis with Lasix IV  -add acetazolamide for metabolic acidosis    fever  -Tm = 38.1, but afeb since 2/23 @ 1200  -WBC = 12 -> 14  -sputum Cx (2/22) - NGTD  -BCx (2/23) - pending x 2 sets  -U/A (2/23) - not consistent with UTI  -procalcitonin (2/23) - 0.25  -hold off on restarting ABx    inadequate oral malnutrition  -Glucerna 1.5 @ 35 mL/hr via OG tube  -had BM yesterday (12/23)

## 2021-02-24 NOTE — PROGRESS NOTE ADULT - ASSESSMENT
80y Female with prior history of prior stent to RCA with occlusion to be managed medically, HTN, PAD, L subclavian stenosis, mild-moderate AS recently hospitalized for cellulitis presents with recurrent cellulitis. IONA's performed while admitted for recurrent cellulitis showed abnormal waveforms and IONA's.  Significant PVD of right common and external iliac arteries noted on CTA.   s/p LE bypass prior to AKA  NSTEMI postop AKA with CHF ? HFpEF in setting of sig MR  remains edematous and in volume overload    cont IV heparin, asa,statin, low dose lopressor  Repeat Echo with mild LV dysfunction with moderate-severe MR secondary to inferoposterior wall motion abnormalities likely in region of known RCA occlusion  maintain Hgb >9, transfuse as needed  BP improved.   Can give 1 dose of iv lasix 40 mg today if BP continues to improve given significant MR

## 2021-02-24 NOTE — PROGRESS NOTE ADULT - REASON FOR ADMISSION
RLE ischemia
cellulitis
RLE PAD
RLE ischemia
cellulitis

## 2021-02-24 NOTE — PROVIDER CONTACT NOTE (EICU) - SITUATION
Jenaro made aware of patient expiration. Pt is not a candidate for donation.     Referral # - [id number] - spoke with Patricio Neff

## 2021-02-24 NOTE — PROGRESS NOTE ADULT - SUBJECTIVE AND OBJECTIVE BOX
Acute Care Surgery / Trauma Surgery   [facility] [place]  ===============================  Interval/Overnight Events: yesterday patient had elevating troponins and BNP. Repeat TTE showed inferior wall motion abnormalities. CXR, procal 0.25, BCx, UA (-). Lasix 40 IV given with good Urine response. Cardiology recs: continue hep gtt, asa, Lipitor, transfuse Hb<9, may need inotropic support. Patient got second dose of IV lasix 40 in the evening with good urine output response. patient tolerated PSV most of the night, however was transitioned to AC per patient request for subjective tiring. remained hemodynamically stable without pressor requirements.       VITAL SIGNS:  T(C): 37.5 (21 @ 20:00), Max: 38.1 (21 @ 12:00)  HR: 69 (21 @ 00:00) (61 - 106)  BP: 91/57 (21 @ 07:00) (76/44 - 92/63)  ABP: 119/54 (21 @ 00:00) (84/43 - 208/110)  ABP(mean): 77 (21 @ 00:00) (56 - 164)  RR: 11 (21 @ 00:00) (5 - 29)  SpO2: 98% (21 @ 00:00) (93% - 100%)  CVP(mm Hg): 16 (21 @ 00:00) (12 - 58)    NEUROLOGY:  Neurologic Medications:  acetaminophen   Tablet .. 975 milliGRAM(s) Oral every 6 hours PRN  aspirin 325 milliGRAM(s) Oral daily  dexMEDEtomidine Infusion 0.2 MICROgram(s)/kG/Hr IV Continuous <Continuous>  HYDROmorphone  Injectable 0.5 milliGRAM(s) IV Push every 4 hours PRN  melatonin 3 milliGRAM(s) Oral at bedtime  oxyCODONE    IR 5 milliGRAM(s) Oral every 6 hours PRN  oxyCODONE    IR 10 milliGRAM(s) Oral every 4 hours PRN      [x ] Adequacy of sedation and pain control has been assessed and adjusted  Comments:    RESPIRATORY:  Respiratory Medications:    Exam:   Mechanical Ventilation: Mode: CPAP with PS, FiO2: 40, PEEP: 8, PS: 10, MAP: 11  ABG - ( 2021 04:02 )  pH: 7.49  /  pCO2: 40    /  pO2: 84    / HCO3: 30    / Base Excess: 6.6   /  SaO2: 98    / Lactate: x          [x ] Extubation Readiness Assessed    CARDIOVASCULAR  Cardiovascular Medications:  metoprolol tartrate 25 milliGRAM(s) Enteral Tube every 6 hours  norepinephrine Infusion 0.05 MICROgram(s)/kG/Min IV Continuous <Continuous>    Exam:  VBG - ( 2021 08:54 )  pH: 7.44  /  pCO2: 49    /  pO2: 26    / HCO3: 32    / Base Excess: 8.6   /  SvO2: 45    / Lactate: 1.4        Metabolic/FLUIDS/ELECTROLYTES/NUTRITION:  Gastrointestinal Medications:  dextrose 5%. 1000 milliLiter(s) IV Continuous <Continuous>  polyethylene glycol 3350 17 Gram(s) Oral daily  senna 2 Tablet(s) Oral at bedtime    I&O's Detail  80y  Daily Weight in k (2021 05:33)      138  |  102  |  24.0<H>  ----------------------------<  161<H>  4.3   |  28.0  |  0.79    Ca    8.2<L>      2021 04:32  Phos  2.5       Mg     2.0             Diet: NPO w/ glucerna TFs    HEMATOLOGIC:  Hematologic/Oncologic Medications:  heparin  Infusion. 1500 Unit(s)/Hr IV Continuous <Continuous>    [ x] DVT Prophylaxis:                                              9.6                   Neurophils% (auto):   x      ( @ 17:26):    14.91)-----------(362          Lymphocytes% (auto):  x                                             29.7                   Eosinphils% (auto):   x        Manual%: Neutrophils x    ; Lymphocytes x    ; Eosinophils x    ; Bands%: x    ; Blasts x          (  @ 17:26 )   PT: x    ;   INR: x      aPTT: 95.9 sec    Transfusions:	[ ] PRBC	[ ] Platelets	[ ] FFP		[ ] Cryoprecipitate    Comments:    INFECTIOUS DISEASE:  Antimicrobials/Immunologic Medications:    RECENT CULTURES:   @ 14:59 .Sputum Sputum     No growth    Few polymorphonuclear leukocytes per low power field  No Squamous epithelial cells per low power field  No organisms seen per oil power field          PATIENT CARE ACCESS DEVICES:  [ ] Peripheral IV  [x ] Central Venous Line	[x ] R	[ ] L	[ x] IJ	[ ] Fem	[ ] SC	Placed:   [ x] Arterial Line		[x ] R	[ ] L	[ ] Fem	[ ] Rad	[x ] Ax	Placed:   [ ] PICC:					[ ] Mediport  [ ] Urinary Catheter, Date Placed:   [ ] Necessity of urinary, arterial, and venous catheters discussed    OTHER MEDICATIONS:  Endocrine/Metabolic Medications:  atorvastatin 80 milliGRAM(s) Oral at bedtime  dextrose 40% Gel 15 Gram(s) Oral once  dextrose 50% Injectable 25 Gram(s) IV Push once  dextrose 50% Injectable 12.5 Gram(s) IV Push once  dextrose 50% Injectable 25 Gram(s) IV Push once  fenofibrate Tablet 145 milliGRAM(s) Oral daily  glucagon  Injectable 1 milliGRAM(s) IntraMuscular once  insulin glargine Injectable (LANTUS) 32 Unit(s) SubCutaneous at bedtime  insulin lispro (ADMELOG) corrective regimen sliding scale   SubCutaneous every 4 hours  levothyroxine 88 MICROGram(s) Oral daily    Genitourinary Medications:    Topical/Other Medications:  chlorhexidine 0.12% Liquid 15 milliLiter(s) Oral Mucosa every 12 hours  chlorhexidine 2% Cloths 1 Application(s) Topical <User Schedule>      PHYSICAL EXAM    Gen: Resting comfortably in bed, intubated    HEENT: PERRL, EOMI     Neurological: Following simple commands, no focal deficit, RASS -1    Neck: Trachea midline, no evidence of JVD, FROM without pain, neck symmetric    Pulmonary: non labored, intubated on mechanical ventilator    Cardiovascular: NSR    Gastrointestinal: morbidly obese, non-tender    : Basilio in place with >150cc UOP/Hr    Skin: Dressing in place on right anterior chest which is C/D, right groin wound well approximated with skin clips and no evidence of infection, R limb remnant with coban dressing    Musculoskeletal: FROM without pain, no deformity or areas of tenderness        ASSESSMENT/PLAN:  80y Female hypoxic respiratory failure, NSTEMI s/p R ax-fem bypass, fem-pop bypass and subsequent AKA    Neuro: goal RASS -1 to 0, continue current regimen    CV:   type 2 MI (demand ischemia)  concern for acute CHF with CXR showing bilateral infiltrates with small bilateral pleural effusions. Patient with elevated but plateaued Troponins and elevated BNP w/ good response to lasix diuresis  Continue to trend troponins.     Pulm: acute respiratory failure with hypoxemia metabolic alkalosis   - Continue on ventilator, PSV as tolerated - wean ventilator requirements as tolerated  - t piece trial today if patient tolerates    GI/Nutrition: Tube feedings at goal    /Renal: Basilio for strict I&O, monitor for worsening of hematuria, f/u am labs    ID: Antibiotic course complete. febrile yesterday. Procalcitonin wnl, UA negative. f/u  Bcx x2    Endo: Lantus, ISS    Skin: Repositioning for DTI prevention while in bed    Heme/DVT Prophylaxis: SCDs, heparin gtt therapeutic    Lines/Tubes: Continue invasive lines    Dispo: Continue in SICU

## 2021-02-24 NOTE — PROGRESS NOTE ADULT - PROVIDER SPECIALTY LIST ADULT
Anesthesia
Anesthesia
Cardiology
Hospitalist
SICU
Surgery
Vascular Surgery
Cardiology
Hospitalist
Hospitalist
Infectious Disease
Podiatry
SICU
Surgery
Surgery
Vascular Surgery
Cardiology
Hospitalist
Hospitalist
Infectious Disease
Orthopedics
Palliative Care
SICU
Surgery
Vascular Surgery
Cardiology
Cardiology
Palliative Care
Surgery
Vascular Surgery
Vascular Surgery
Hospitalist

## 2021-02-24 NOTE — PROGRESS NOTE ADULT - SUBJECTIVE AND OBJECTIVE BOX
INTERVAL HPI/OVERNIGHT EVENTS:  Elevated trop yest morning, peaked at 1.10, cards following along for management of NSTEMI. Pt remains intubated, unable to obtain subjective exam. On levo, precedex; tube feeds and heparin gtt. Bedside ultrasound showing plump IVC, pt with CHF, per sicu goal is net negative fluid balance.   Dressing down today.     MEDICATIONS  (STANDING):  aspirin 325 milliGRAM(s) Oral daily  atorvastatin 80 milliGRAM(s) Oral at bedtime  chlorhexidine 0.12% Liquid 15 milliLiter(s) Oral Mucosa every 12 hours  chlorhexidine 2% Cloths 1 Application(s) Topical <User Schedule>  dexMEDEtomidine Infusion 0.2 MICROgram(s)/kG/Hr (4.83 mL/Hr) IV Continuous <Continuous>  dextrose 40% Gel 15 Gram(s) Oral once  dextrose 5%. 1000 milliLiter(s) (100 mL/Hr) IV Continuous <Continuous>  dextrose 50% Injectable 25 Gram(s) IV Push once  dextrose 50% Injectable 12.5 Gram(s) IV Push once  dextrose 50% Injectable 25 Gram(s) IV Push once  fenofibrate Tablet 145 milliGRAM(s) Oral daily  glucagon  Injectable 1 milliGRAM(s) IntraMuscular once  heparin  Infusion. 1500 Unit(s)/Hr (15 mL/Hr) IV Continuous <Continuous>  insulin glargine Injectable (LANTUS) 32 Unit(s) SubCutaneous at bedtime  insulin lispro (ADMELOG) corrective regimen sliding scale   SubCutaneous every 4 hours  levothyroxine 88 MICROGram(s) Oral daily  melatonin 3 milliGRAM(s) Oral at bedtime  metoprolol tartrate 25 milliGRAM(s) Enteral Tube every 6 hours  norepinephrine Infusion 0.05 MICROgram(s)/kG/Min (9.05 mL/Hr) IV Continuous <Continuous>  polyethylene glycol 3350 17 Gram(s) Oral daily  senna 2 Tablet(s) Oral at bedtime    MEDICATIONS  (PRN):  acetaminophen   Tablet .. 975 milliGRAM(s) Oral every 6 hours PRN Temp greater or equal to 38C (100.4F), Mild Pain (1 - 3)  HYDROmorphone  Injectable 0.5 milliGRAM(s) IV Push every 4 hours PRN Severe Pain (7 - 10)  oxyCODONE    IR 5 milliGRAM(s) Oral every 6 hours PRN Moderate Pain (4 - 6)  oxyCODONE    IR 10 milliGRAM(s) Oral every 4 hours PRN Severe Pain (7 - 10)      Vital Signs Last 24 Hrs  T(C): 37.5 (2021 20:00), Max: 38.1 (2021 12:00)  T(F): 99.5 (2021 20:00), Max: 100.6 (2021 12:00)  HR: 75 (2021 00:40) (61 - 106)  BP: 91/57 (2021 07:00) (76/44 - 92/63)  BP(mean): 69 (2021 07:00) (55 - 78)  RR: 11 (2021 00:00) (5 - 29)  SpO2: 98% (2021 00:40) (93% - 100%)    Physical Exam:   GEN: Intubated, sedated, elderly female.  Resp: mechanically ventilated  CVS: RRR  GI: Abdomen soft, non-tender, non-distended, no rebound tenderness / guarding  Vasc: RLE stump dressing inact. Left femoral pulse 1+, unable to palpate Right femoral pulse    I&O's Detail    2021 07:01  -  2021 07:00  --------------------------------------------------------  IN:    Albumin 5%  - 250 mL: 250 mL    Dexmedetomidine: 297.5 mL    Enteral Tube Flush: 320 mL    FentaNYL: 29 mL    Glucerna 1.5: 840 mL    Heparin Infusion: 360 mL    multiple electrolytes Injection Type 1.: 350 mL    Norepinephrine: 10.8 mL  Total IN: 2457.3 mL    OUT:    Indwelling Catheter - Urethral (mL): 1080 mL  Total OUT: 1080 mL    Total NET: 1377.3 mL      2021 07:01  -  2021 00:47  --------------------------------------------------------  IN:    Dexmedetomidine: 66.5 mL    Dexmedetomidine: 66 mL    Enteral Tube Flush: 240 mL    Glucerna 1.5: 595 mL    Heparin Infusion: 45 mL    Heparin Infusion: 210 mL    IV PiggyBack: 250 mL    Norepinephrine: 19.8 mL  Total IN: 1492.3 mL    OUT:    Indwelling Catheter - Urethral (mL): 1750 mL  Total OUT: 1750 mL    Total NET: -257.7 mL          LABS:                        9.6    14.91 )-----------( 362      ( 2021 17:26 )             29.7         138  |  102  |  24.0<H>  ----------------------------<  161<H>  4.3   |  28.0  |  0.79    Ca    8.2<L>      2021 04:32  Phos  2.5       Mg     2.0           PTT - ( 2021 17:26 )  PTT:95.9 sec  Urinalysis Basic - ( 2021 11:54 )    Color: Yellow / Appearance: Slightly Turbid / S.015 / pH: x  Gluc: x / Ketone: Negative  / Bili: Negative / Urobili: 8 mg/dL   Blood: x / Protein: 15 mg/dL / Nitrite: Negative   Leuk Esterase: Small / RBC: 25-50 /HPF / WBC 6-10   Sq Epi: x / Non Sq Epi: Occasional / Bacteria: Few

## 2021-02-24 NOTE — PROCEDURE NOTE - NSPATIENTPOSTION_GEN_A_CORE
Physical Therapy Daily Treatment     Visit Count: 4  Plan of Care Dates: Initial: 10/13/2017 Through: 12/8/2017   Insurance Information: Medicare network, [employer] codes apply  Next Referring Provider Visit: none scheduled    Referred by: Jose Alfredo Guardado MD  Medical Diagnosis (from order):  Chronic back pain, unspecified back location, unspecified back pain laterality [M54.9, G89.29]  Chronic shoulder pain, unspecified laterality [M25.519, G89.29]    Treatment Diagnosis: Thoracic Symptoms with Pain, Impaired Posture, Impaired Joint Mobility and Impaired Range of Motion  Insurance: 1. [employer]  2. N/A     Date of onset/injury: a few years ago     Diagnosis Precautions: none  Chart reviewed: Relevant co-morbidities, allergies, tests and medications: PACEMAKER, SKIN CA (squamous cell, basal cell)       SUBJECTIVE   I had less pain the night of last visit and the next day.  The pain doesn't come back as quickly and I'm able to relieve it by lying down and stretching.   I did a lot yesterday, I really pushed it, was very active and couldn't stop when pain increased.  Pain was 8/10 after mowing the lawn.  I've had a HA for about 4 days along with a stiff neck.  Current Pain: 2/10.    Functional Change: less pain overall unless over doing it    OBJECTIVE       Treatment   Manual:  STM to left thoracic paraspinals, rhomboids, semi-prone  STM to right cervical musculature seated  L QL stretch in right sidelying     Therapeutic Exercise:   Exercise Repetitions Sets Position/Cues   Gentle scapular protraction into retraction* 10       Tennis ball wall massage*         Supine laying for breaks*         Supine B shoulder abd w/scap depression 5       Supine pect stretch B* 3   30\"   Snow angels w/UE's sitting* 10   Inhale abd/exhale w/return   Scap retract*        Seated L QL stretch* 3   30\"   scap retract yellow tband 10       Comments:         Current Home Program (not performed this date except as noted above):   * 
above denotes home program issuance as well  Heat or ice as needed       ASSESSMENT   Making progress with decrease pain in left thoracic region and less frequently.  Able to participate in daily activities with less pain, except, had more pain after mowing the lawn with rider.      Pain after treatment: 2/10  Result of above outlined education: Verbalizes understanding and Demonstrates understanding    Goals:       To be obtained by end of this plan of care:  1. Patient independent with modified and progressed home exercise program.  2. Patient will decrease thoracic pain to 2/10 to aid in completing household tasks.   3. Patient will report >60% improvement since onset of therapy.        PLAN   periscap strengthening, stretches, manual     THERAPY DAILY BILLING   Primary Insurance:  [employer]IC[employer]  Secondary Insurance: N/A    Evaluation Procedures:  No evaluation codes were used on this date of service    Timed Procedures:  Manual Therapy, 23 minutes  Therapeutic Exercise, 20 minutes    Untimed Procedures:  No untimed codes were used on this date of service    Total Treatment Time: 43 minutes    Routine safety standards followed per [facility] system and site policies      G-Code:  G-Code Score ABN form  reporting not required this treatment session  Modifier based on outcome measure(s)/functional testing/clinical judgement as listed above    The referring provider's electronic or written signature on the evaluation authorizes the therapy plan of care and certifies the need for these services, furnished under this plan of care while under their care.  Physician Signature on file.     
supine
Trendelenburg

## 2021-02-24 NOTE — PROCEDURE NOTE - NSPROCDETAILS_GEN_ALL_CORE
location identified, draped/prepped, sterile technique used, needle inserted/introduced/positive blood return obtained via catheter/connected to a pressurized flush line/sutured in place/hemostasis with direct pressure, dressing applied/Seldinger technique
patient pre-oxygenated, tube inserted, placement confirmed
guidewire recovered/lumen(s) aspirated and flushed/sterile dressing applied/sterile technique, catheter placed/ultrasound guidance with use of sterile gel and probe cove

## 2021-02-24 NOTE — DISCHARGE NOTE FOR THE EXPIRED PATIENT - OTHER SIGNIFICANT FINDINGS
Patient  with in 24 hours of being in restraints. B/L wrist restraints were discontinued on 21 @1110am, patient  on 21 @1636. CMS log updated.

## 2021-02-24 NOTE — CHART NOTE - NSCHARTNOTEFT_GEN_A_CORE
Called by RN to evaluate patient for complaints of increased right foot pain. Denies fever, chills, headaches, dizziness, chest pain/pressure, palpitations, shortness of breath or difficulty breathing. She does admit to having a few episodes of semi formed loose stools. Patient also  had xray of left knee yesterday evening, no acute findings noted.       Vital Signs Last 24 Hrs  T(C): 36.7 (09 Feb 2021 17:35), Max: 36.7 (09 Feb 2021 11:15)  T(F): 98 (09 Feb 2021 17:35), Max: 98 (09 Feb 2021 11:15)  HR: 89 (09 Feb 2021 17:35) (86 - 89)  BP: 126/63 (09 Feb 2021 17:35) (116/65 - 126/63)  BP(mean): --  RR: 18 (09 Feb 2021 17:35) (18 - 20)  SpO2: 100% (09 Feb 2021 17:35) (93% - 100%)      GENERAL: Pt in bed , reports pain to right foot, pain rated as 9/10, throbbing  EYES: EOMI, PERRL, conjunctiva and sclera clear  ENT: Moist mucous membranes  NECK: No JVD  CHEST/LUNG: Clear to auscultation bilaterally; No rales, rhonchi, wheezing, or rubs. Unlabored respirations  HEART: S1, S2, Regular rate and rhythm   ABDOMEN: Bowel sounds present; Soft, Nontender, Nondistended. No guarding or rigidity   EXTREMITIES:  2+ Peripheral Pulses, brisk capillary refill. No clubbing or cyanosis. 1+ pitting edema to dorsum of right foot with localized erythema. Left knee with moderate size joint effusion. FROM of all joints; however, limited to left knee due to effusion. No erythema or increased warmth, nontender to palpation   NERVOUS SYSTEM:  Alert & Oriented X3, speech clear, FROM x 4 extremities. No deficits   SKIN: No rashes or lesions      MEDICATIONS  (STANDING):  aspirin enteric coated 325 milliGRAM(s) Oral daily  atorvastatin 80 milliGRAM(s) Oral at bedtime  cilostazol 100 milliGRAM(s) Oral every 12 hours  dextrose 50% Injectable 25 Gram(s) IV Push once  dextrose 50% Injectable 12.5 Gram(s) IV Push once  dextrose 50% Injectable 25 Gram(s) IV Push once  enoxaparin Injectable 40 milliGRAM(s) SubCutaneous daily  fenofibrate Tablet 145 milliGRAM(s) Oral daily  glucagon  Injectable 1 milliGRAM(s) IntraMuscular once  insulin glargine Injectable (LANTUS) 15 Unit(s) SubCutaneous at bedtime  insulin lispro (ADMELOG) corrective regimen sliding scale   SubCutaneous three times a day before meals  insulin lispro (ADMELOG) corrective regimen sliding scale   SubCutaneous at bedtime  levothyroxine 88 MICROGram(s) Oral daily  lisinopril 40 milliGRAM(s) Oral daily  metoprolol succinate  milliGRAM(s) Oral daily  piperacillin/tazobactam IVPB.. 3.375 Gram(s) IV Intermittent every 8 hours  ranolazine 500 milliGRAM(s) Oral two times a day  saccharomyces boulardii 250 milliGRAM(s) Oral two times a day  vancomycin  IVPB 750 milliGRAM(s) IV Intermittent every 12 hours    MEDICATIONS  (PRN):  acetaminophen   Tablet .. 650 milliGRAM(s) Oral every 6 hours PRN Temp greater or equal to 38C (100.4F), Mild Pain (1 - 3)    A/P    Pt is a 81y/o F with a history of diabetes, peripheral vascular disease, COPD, hypothyroidism, coronary artery disease, hypertension, presents with recurrent right foot cellulitis resulting in acute pain. patient also having loose stools likely d/t antibiotic tx.  -Give oxycodone 5/325 mg tab now  -elevate right  foot  -Florastar 250 mg po BID X 5 days, if stools become watery will send stool for c-dif cx.
DaughterRachael was called and updated on patients status and plan. All questions were answered
POST-OPERATIVE NOTE    PROCEDURE: s/p ligation and excision of fem-pop graft and R AKA    INTERVAL HPI: Patient was brought to the SICU intubated and on levophed. Patient's repeat Hgb 9.1 post-op from 8.5 pre-op. Patient was more alert and SICU attempted to extubate patient, however patient became bradycardic and became apneic, however she responded to atropine and is now reintubated. Patient currently intubated and sedated.     Vital Signs Last 24 Hrs  T(C): 36.8 (21 Feb 2021 15:26), Max: 36.8 (21 Feb 2021 15:26)  T(F): 98.2 (21 Feb 2021 15:26), Max: 98.2 (21 Feb 2021 15:26)  HR: 106 (21 Feb 2021 16:20) (84 - 108)  BP: --  BP(mean): --  RR: 16 (21 Feb 2021 15:00) (13 - 32)  SpO2: 99% (21 Feb 2021 16:20) (93% - 100%)    PE  Gen: Intubated and sedated  Pulm: AC on vent  CV: +s1/s2  Abd: Soft, obese  Ext: RLE dressing intact    80 year old female with peripheral vascular disease s/p right femoral artery patch repair and endarterectomy with ax-fem, fem-pop PTFE bypass, w/occluded fem-pop and ischemic RLE POD1 s/p explantation of fem-pop and R AKA    -resume hep gtt in 4 hours post-op at previous rate, no bolus  -dressing for 3 days  -wean pressors per SICU  -wean to extubate per SICU  -surgery will continue to follow
Patient noted be asystolic on the monitor.  Patient seen and examined. Patient pulseless, without cardiac activity, no spontaneous respirations, no response to noxious stimuli or pupillary response.  Patient pronounced at 16:46 Dr. Ashford notified. Family at bedside.
Patient seen and examined at bedside for complaint of intermittent, nonradiating mid-sternal chest pain described as "pressure". States the pain feels better when she is sitting up. Denies palpitations, n/v/d/c, abdominal pain, headaches, dizziness, SOB.     While at bedside RN endorsing concerns of R foot having weak pulses. Pt denies severe pain or sensitivity to feet, difficulty ambulating.     Vital Signs:  T(C): 36.8 (02-10-21 @ 21:16), Max: 36.9 (02-10-21 @ 19:56)  T(F): 98.3 (02-10-21 @ 21:16), Max: 98.4 (02-10-21 @ 19:56)  HR: 99 (02-10-21 @ 21:45) (72 - 102)  BP: 135/81 (02-10-21 @ 21:45) (110/67 - 156/72)  RR: 18 (02-10-21 @ 21:45) (18 - 22)  SpO2: 94% (02-10-21 @ 21:45) (92% - 95%)      PHYSICAL EXAM:  GENERAL: NAD, seated in chair, pleasant  HEAD:  Atraumatic, Normocephalic  EYES: EOMI, conjunctiva and sclera clear  ENT: Moist mucous membranes  CHEST/LUNG: Clear to auscultation bilaterally; No rales, rhonchi, wheezing, or rubs. Unlabored respirations  HEART: Regular rate and rhythm; No murmurs, rubs, or gallops  EXTREMITIES: 2+ Pitting edema to b/l lower extremities. Erythema and scaling to dorsal aspect of R foot. Cool, dry. Delayed capillary refill R>L.   NERVOUS SYSTEM:  Alert & Oriented X3, speech clear. Answers questions appropriately. No deficits   MSK: FROM all 4 extremities, full and equal strength, normal gait      Imaging:    EXAM:  US PHYSIOL LWR EXT 3+ LEV BI       IMPRESSION:  Markedly abnormal study, as described above. ABIs compatible with severe bilateral peripheral vascular disease.  There are very faint dorsalis pedis and posterior tibial artery pulses bilaterally.  Abnormally low left brachial artery pressure. Correlate clinically.  Above findings were discussed with Dr. Alcala at 6:30 PM on 02/10/2021.  EMILY ABAD MD; Attending Radiologist  This document has been electronically signed. Feb 10 2021  7:38PM        A/P:    Chest pain, now resolved  -EKG ordered and reviewed, , sinus tachyardia, no ST-T wave abnormalities noted  -BMP, mag, phos, troponin ordered and reviewed; WNL  -Offered patient ibuprofen/tylenol, declined, states CP has resolved  -Pt instructed to notify RN with any new s/sx      Severe PVD with overlying cellulitis R foot  -Continue abx per ID  -Podiatry recs appreciated  -Continue aspirin, cilostazol   -DVT prophylaxis  -Vascular consulted for further recommendation
Patient was extubated after passing a t-piece trial, met clinical criteria for extubation.  Patient extubated to Bipap.  Shortly after patient had increased work of breathing, tachypneic, in respiratory distress.  Patient was reintubated with succinylcholine, versed, and fentanyl with attending at bedside.  Positive colormetric change, breath sounds auscultated bilaterally. CXR pending.  Patient's daughter was called and will be coming in to see patient.
Patient with increasing pressure requirements. POCUS showing collapsable IVC with 70% variability. Repeat labs showing worsening lactic acidosis. Patient requiring increasing fluid resuscitation which likely includes blood transfusion. Patient per living will states no extraordinary measures, to be DNR, and does not which to include blood transfusion. Patient's daughter Rachael Solis [phone] who is designated health care proxy was called to confirm living will. Daughter following discussion with attending Dr. Wells earlier today aware of patient's current clinic situation. Patient's health care proxy states that patient is allowed for blood transfusion in the post-operative course. Consent signed by Dr. Kari Carrillo and witnessed by this provider and CARLOS PINTO
Pt asked to facetime family today and would like to personally speak with them.  Facetime conversation arranged.
Pt transported from PACU to SICU room 21 without incidient aT THIS TIME.  pT MAINTAINED ON CURRENT SETTINGS.
SICU TRANSFER NOTE  -----------------------------  ICU Admission Date: 2/15  Transfer Date: 02-17-21 @ 10:58    Admission Diagnosis: Critical limb ischemia     Active Problems/injuries:  Ischemia to RLE, DM, Hypothyroid    Procedures: 2/15: right axillary to femoral, femoral popliteal bypass     Consultants:  [x ] Cardiology  [ ] Endocrine  [x ] Infectious Disease  [x ] Medicine  [ ]Neurosurgery  [ ] Ortho       [ ] Weight Bearing Status:  [ ] Palliative       [ ] Advanced Directives:    [ ] Physical Medicine and Rehab       [ ] Disposition :   [ ] Plastics  [ ] Pulmonary    Medications  aspirin 325 milliGRAM(s) Oral daily  atorvastatin 80 milliGRAM(s) Oral at bedtime  ceFAZolin   IVPB 2000 milliGRAM(s) IV Intermittent every 8 hours  chlorhexidine 2% Cloths 1 Application(s) Topical <User Schedule>  cilostazol 100 milliGRAM(s) Oral every 12 hours  fenofibrate Tablet 145 milliGRAM(s) Oral daily  glucagon  Injectable 1 milliGRAM(s) IntraMuscular once  heparin  Infusion 800 Unit(s)/Hr IV Continuous <Continuous>  insulin glargine Injectable (LANTUS) 20 Unit(s) SubCutaneous at bedtime  insulin lispro (ADMELOG) corrective regimen sliding scale   SubCutaneous three times a day before meals  insulin lispro Injectable (ADMELOG) 6 Unit(s) SubCutaneous before breakfast  insulin lispro Injectable (ADMELOG) 6 Unit(s) SubCutaneous before lunch  insulin lispro Injectable (ADMELOG) 6 Unit(s) SubCutaneous before dinner  levothyroxine 88 MICROGram(s) Oral daily  metoprolol tartrate 50 milliGRAM(s) Oral every 12 hours  ondansetron Injectable 4 milliGRAM(s) IV Push every 6 hours PRN  oxycodone    5 mG/acetaminophen 325 mG 1 Tablet(s) Oral every 4 hours PRN  oxycodone    5 mG/acetaminophen 325 mG 2 Tablet(s) Oral every 4 hours PRN  polyethylene glycol 3350 17 Gram(s) Oral daily  ranolazine 500 milliGRAM(s) Oral two times a day  senna 2 Tablet(s) Oral at bedtime  tamsulosin 0.4 milliGRAM(s) Oral at bedtime      [ x] I attest I have reviewed and reconciled all medications prior to transfer    IV Fluids  lactated ringers.: Solution, 1000 milliLiter(s) infuse at 75 mL/Hr  Provider's Contact #: [phone]  lactated ringers.: Solution, 1000 milliLiter(s) infuse at 100 mL/Hr  Special Instructions: Peripheral Line 3  Provider's Contact #: [phone]  lactated ringers.: Solution, 1000 milliLiter(s) infuse at 100 mL/Hr  lactated ringers Bolus:   1000 milliLiter(s), IV Bolus, once, infuse over 60 Minute(s), Stop After 1 Doses  Special Instructions: Please give 1-2h prior to CTA of LE  Provider's Contact #: [phone]    Indication: Cellulitis     Antibiotics:  ceFAZolin   IVPB 2000 milliGRAM(s) IV Intermittent every 8 hours    Indication: 2/15       I have discussed this case with Dr. Cardoza upon transfer and all questions regarding ICU course were answered.  The following items are to be followed up:  1. Adequate pain control- continue current regimen  2. COPD, aggressive pulmonary tolieting, IS, Albuterol  3. Continue cardiac medications, f/u cards recs  4. Tolerating diet, IVL  5. Failed TOV, flormax  6. Continue lantus, premeal and ISS  7. Heparin gtt, PTT 60-90  8. Ancef for cellulitis as per ID  9. Limb ischemia f/u with Dr. Cabral's team, call [phone], vascular team for planing of amputation
Source: Patient [ ]  Family [ ]   other [x ]EMR    Current Diet: Clears, consistent CHO diet  pt reports likes the broth    PO intake:  < 50% [ ]   50-75%  [x ]   %  [ ]  other :    Source for PO intake [x ] Patient [ ] family [ ] chart [ ] staff [ ] other    Enteral /Parenteral Nutrition:     Current Weight: 2/20 227#  generalized 3 + edema    % Weight Change     Pertinent Medications: MEDICATIONS  (STANDING):  aspirin 325 milliGRAM(s) Oral daily  atorvastatin 80 milliGRAM(s) Oral at bedtime  ceFAZolin   IVPB 2000 milliGRAM(s) IV Intermittent every 8 hours  chlorhexidine 2% Cloths 1 Application(s) Topical <User Schedule>  dextrose 40% Gel 15 Gram(s) Oral once  dextrose 5%. 1000 milliLiter(s) (50 mL/Hr) IV Continuous <Continuous>  dextrose 5%. 1000 milliLiter(s) (100 mL/Hr) IV Continuous <Continuous>  dextrose 50% Injectable 25 Gram(s) IV Push once  dextrose 50% Injectable 12.5 Gram(s) IV Push once  dextrose 50% Injectable 25 Gram(s) IV Push once  fenofibrate Tablet 145 milliGRAM(s) Oral daily  glucagon  Injectable 1 milliGRAM(s) IntraMuscular once  heparin  Infusion. 1400 Unit(s)/Hr (14 mL/Hr) IV Continuous <Continuous>  insulin glargine Injectable (LANTUS) 30 Unit(s) SubCutaneous at bedtime  insulin lispro (ADMELOG) corrective regimen sliding scale   SubCutaneous Before meals and at bedtime  ipratropium    for Nebulization 500 MICROGram(s) Nebulizer every 6 hours  levalbuterol Inhalation 1.25 milliGRAM(s) Inhalation every 6 hours  levothyroxine 88 MICROGram(s) Oral daily  melatonin 3 milliGRAM(s) Oral at bedtime  metoprolol tartrate 25 milliGRAM(s) Oral every 6 hours  polyethylene glycol 3350 17 Gram(s) Oral daily  senna 2 Tablet(s) Oral at bedtime  tamsulosin 0.4 milliGRAM(s) Oral at bedtime    MEDICATIONS  (PRN):  acetaminophen   Tablet .. 975 milliGRAM(s) Oral every 6 hours PRN Mild Pain (1 - 3)  heparin   Injectable 8000 Unit(s) IV Push every 6 hours PRN For aPTT less than 40  heparin   Injectable 4000 Unit(s) IV Push every 6 hours PRN For aPTT between 40 - 57  HYDROmorphone  Injectable 0.5 milliGRAM(s) IV Push every 4 hours PRN Breakthrough pain  metoprolol tartrate Injectable 5 milliGRAM(s) IV Push once PRN HR >110  ondansetron Injectable 4 milliGRAM(s) IV Push every 6 hours PRN Nausea  oxyCODONE    IR 5 milliGRAM(s) Oral every 6 hours PRN Moderate Pain (4 - 6)  oxyCODONE    IR 10 milliGRAM(s) Oral every 6 hours PRN Severe Pain (7 - 10)    Pertinent Labs: CBC Full  -  ( 21 Feb 2021 10:58 )  WBC Count : 11.75 K/uL  RBC Count : 2.84 M/uL  Hemoglobin : 8.5 g/dL  Hematocrit : 26.4 %  Platelet Count - Automated : 312 K/uL  Mean Cell Volume : 93.0 fl  Mean Cell Hemoglobin : 29.9 pg  Mean Cell Hemoglobin Concentration : 32.2 gm/dL  Auto Neutrophil # : 8.49 K/uL  Auto Lymphocyte # : 1.47 K/uL  Auto Monocyte # : 1.03 K/uL  Auto Eosinophil # : 0.17 K/uL  Auto Basophil # : 0.04 K/uL  Auto Neutrophil % : 72.3 %  Auto Lymphocyte % : 12.5 %  Auto Monocyte % : 8.8 %  Auto Eosinophil % : 1.4 %  Auto Basophil % : 0.3 %      02-21 Na137 mmol/L Glu 81 mg/dL K+ 3.9 mmol/L Cr  0.77 mg/dL BUN 20.0 mg/dL Phos 3.4 mg/dL Alb 2.3 g/dL<L> PAB n/a           Skin:     Nutrition focused physical exam not conducted - found signs of malnutrition [ ]absent [ ]present    Subcutaneous fat loss: [ ] Orbital fat pads region, [ ]Buccal fat region, [ ]Triceps region,  [ ]Ribs region    Muscle wasting: [ ]Temples region, [ ]Clavicle region, [ ]Shoulder region, [ ]Scapula region, [ ]Interosseous region,  [ ]thigh region, [ ]Calf region    Estimated Needs:   [x ] no change since previous assessment  [ ] recalculated:     Current Nutrition Diagnosis: Increased Nutrient Needs related to increased physiological demand for nutrient as evidenced by Right foot cellulitis with severe PAD. Pt reports she has a friend who is a RD and she will be helping her to lose weight. Encouragement provided taking liquids fair at this time. Requesting extra chic broth.       Recommendations:  Pt requesting Glucerna shake when diet is advanced to consistent CHO. Rec BID   REc MVI, Vit C    Monitoring and Evaluation:   [x ] PO intake [x] Tolerance to diet prescription [X] Weights  [X] Follow up per protocol [X] Labs:
CTA of b/l LE requested by Dr. Coleamn. This was discussed with Dr. Alcala who is agreeable but notes the risk of worsening CHRIS. After discussion with her it was agreed that the patient should receive IVF bolus prior to CT for renal protection. CT and bolus ordered.
Pt seen and exam. c/o rt foot pain. No fever,chill, no sob,cp,legs pain  Breathing RA. last so2 document 90% early am w/ sbp 166  Pt denies any cough,sob  will recheck vital. due for am bp meds  spoke with RN TO CHECK VITAL  CONTINUE ABX. F/U ID rec  c/s podiatry    GEN - NAD  HEENT - NCAT, EOMI, UZIEL,   RESP - CTA BL, no wheeze/stridor/rhonchi/crackles. not on supplemental O2.  CARDIO - NS1S2, RRR. No murmurs/rubs/gallops.  ABD - Soft/Non tender/Non distended. Normal BS x4 quadrants.   Ext - rt foot- erythema, warm , + mild tenderness,skinpeeling. no active open wound/discharge. no legs swelling, tenderness. + pp b/l  MSK - BL 5/5 strength on upper and lower extremities.   Neuro - cn 2-12 grossly intact. no visible seizure activity appreciated. no tremor. gait not observed.   Psych- AAOx3. appropriate behaviour. attentive. normal affect.  see detailed h & p.  care of plan dw pt-agreed with above plan
Spoke with patient's daughter, Rachael, for daily status update.  All questions regarding patient's clinical status and treatment plan was addressed.

## 2021-02-24 NOTE — PROGRESS NOTE ADULT - SUBJECTIVE AND OBJECTIVE BOX
[facility]                              [address] [place] [address]                                                 PHONE: [phone]                                                 FAX: [phone]  -----------------------------------------------------------------------------------------------  Pt seen and examined. FU for elevated troponin    Overnight events/Complaints: Pt remains intubated but on pressure support. Awake appears alert. BP better.    Vital Signs Last 24 Hrs  T(C): 36.9 (24 Feb 2021 08:00), Max: 38.1 (23 Feb 2021 12:00)  T(F): 98.5 (24 Feb 2021 08:00), Max: 100.6 (23 Feb 2021 12:00)  HR: 73 (24 Feb 2021 08:00) (61 - 102)  BP: --  BP(mean): --  RR: 19 (24 Feb 2021 08:00) (5 - 28)  SpO2: 99% (24 Feb 2021 08:00) (92% - 100%)  I&O's Summary    23 Feb 2021 07:01  -  24 Feb 2021 07:00  --------------------------------------------------------  IN: 1697.3 mL / OUT: 2150 mL / NET: -452.7 mL      PHYSICAL EXAM:  Constitutional: Obese female in bed, intubated, awake  HEENT:     Head: Normocephalic and atraumatic  Neck: supple. No JVD  Cardiovascular: regular S1 S2  Respiratory: Lungs clear to auscultation; no crepitations, no wheeze  Gastrointestinal:  Soft, Non-tender, + BS	  Musculoskeletal: s/p AKA,  + edema  Skin: Warm and edematous  Neurologic: Alert        LABS:                        9.1    14.11 )-----------( 363      ( 24 Feb 2021 04:44 )             28.5     02-24    138  |  99  |  25.0<H>  ----------------------------<  163<H>  4.0   |  31.0<H>  |  0.69    Ca    8.4<L>      24 Feb 2021 04:44  Phos  3.2     02-24  Mg     1.7     02-24      CARDIAC MARKERS ( 24 Feb 2021 04:44 )  x     / 0.90 ng/mL / x     / x     / x      CARDIAC MARKERS ( 23 Feb 2021 09:13 )  x     / 1.05 ng/mL / x     / x     / x      CARDIAC MARKERS ( 23 Feb 2021 04:32 )  x     / 1.10 ng/mL / x     / x     / x      CARDIAC MARKERS ( 22 Feb 2021 23:53 )  x     / 1.05 ng/mL / x     / x     / x      CARDIAC MARKERS ( 22 Feb 2021 15:29 )  x     / 0.79 ng/mL / x     / x     / x          PTT - ( 24 Feb 2021 04:44 )  PTT:75.6 sec    RADIOLOGY & ADDITIONAL STUDIES: (reviewed)  CXR was independently visualized/reviewed  and demonstrated: congestion    CARDIOLOGY TESTING:(reviewed)     ECHOCARDIOGRAM independently visualized/reviewed and demonstrated :    1. Technically difficult study.   2. Mildly decreased global left ventricular systolic function.   3. Left ventricular ejection fraction,by visual estimation, is 45 to 50%.   4. Multiple left ventricular regional wall motion abnormalities exist. See wall motion findings.   5. Mildly increased LV wall thickness.   6. Normal left atrial size.   7. Normal right atrial size.   8. Mild thickening and calcification of the anterior and posterior mitral valve leaflets.   9. Moderate to severe mitral valve regurgitation.  10. Moderate mitral annular calcification.  11. Moderate tricuspid regurgitation.  12. Mild aortic regurgitation.  13. Mild aortic valve stenosis.  14. Mitral valve mean gradient is 4.2 mmHg consistent with mild mitral stenosis.  15. Peak transaortic gradient equals 23.4 mmHg, mean transaortic gradient equals 11.4 mmHg, the calculated aortic valve area equals 1.51 cm²by the continuity equation consistent with mild aortic stenosis.  16. The right atrial pressure is moderately elevated.  17. There is no evidence of pericardial effusion.  18. There is new regional wall motion abnormality and decrease in LV function compared to a TTE from 2/18/21. Findings LIA Rasheed.    Echo 3/2020: LVEF 55%, moderate MR, mild AS, mild TR    CARDIAC CATHETERIZATION:6/2018: Normal LAD, LCX with 100% stenosis of the distal RCA at site of prior stent. Medical therapy was recommended.    TELEMETRY independently visualized/reviewed and demonstrated : NSR [ 70-80  ]  with no arrhythmias;     MEDICATIONS:(reviewed)  MEDICATIONS  (STANDING):  aspirin 325 milliGRAM(s) Oral daily  atorvastatin 80 milliGRAM(s) Oral at bedtime  chlorhexidine 0.12% Liquid 15 milliLiter(s) Oral Mucosa every 12 hours  chlorhexidine 2% Cloths 1 Application(s) Topical <User Schedule>  dexMEDEtomidine Infusion 0.2 MICROgram(s)/kG/Hr (4.83 mL/Hr) IV Continuous <Continuous>  dextrose 40% Gel 15 Gram(s) Oral once  dextrose 5%. 1000 milliLiter(s) (100 mL/Hr) IV Continuous <Continuous>  dextrose 50% Injectable 25 Gram(s) IV Push once  dextrose 50% Injectable 12.5 Gram(s) IV Push once  dextrose 50% Injectable 25 Gram(s) IV Push once  fenofibrate Tablet 145 milliGRAM(s) Oral daily  glucagon  Injectable 1 milliGRAM(s) IntraMuscular once  heparin  Infusion. 1500 Unit(s)/Hr (15 mL/Hr) IV Continuous <Continuous>  insulin glargine Injectable (LANTUS) 32 Unit(s) SubCutaneous at bedtime  insulin lispro (ADMELOG) corrective regimen sliding scale   SubCutaneous every 4 hours  levothyroxine 88 MICROGram(s) Oral daily  melatonin 3 milliGRAM(s) Oral at bedtime  metoprolol tartrate 25 milliGRAM(s) Enteral Tube every 6 hours  norepinephrine Infusion 0.05 MICROgram(s)/kG/Min (9.05 mL/Hr) IV Continuous <Continuous>  polyethylene glycol 3350 17 Gram(s) Oral daily  senna 2 Tablet(s) Oral at bedtime

## 2021-02-24 NOTE — AIRWAY PLACEMENT NOTE ADULT - AIRWAY COMMENTS:
uBzz RUTLEDGE was unable to visualize chords. Dr. Ashford intubated patient with [place] Scope with slight difficulty passing ETT due to airway edema, although was able to intubated with 1 attempt. Buzz RUTLEDGE was unable to visualize chords. Dr. Ashford intubate patient with [place] Scope with slight difficulty passing ETT due to airway edema, although was able to intubated with 1 attempt.

## 2021-02-24 NOTE — AIRWAY REMOVAL NOTE  ADULT & PEDS - ARTIFICAL AIRWAY REMOVAL COMMENTS
comfort care.
patient needed to be reintubated due to agonal breathing and bradycardia
Patient suctioned orally and endotracheally pre extubation and orally post.  Voice intact.  Patient given duoneb inline with bipap post extubation for expiratory wheezing.
HR 94/ SpO2 96%/ RR16

## 2021-02-24 NOTE — CHART NOTE - NSCHARTNOTESELECT_GEN_ALL_CORE
Event Note
Event Note
Family Contact/Event Note
Event Note
Nutrition Services
Post-op check/Event Note
Transfer Note
family update note/Event Note

## 2021-02-24 NOTE — PROGRESS NOTE ADULT - ASSESSMENT
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     - RLE dressing down today  - cont heparin gtt, therapeutic  - Tube feeds  - wean vent and pressors as tolerated  - appreciate ICU level of care.   - DNR

## 2021-02-24 NOTE — DISCHARGE NOTE FOR THE EXPIRED PATIENT - HOSPITAL COURSE
80yF hx of PVD s/p bypass in  now admitted with cellulitis and critical limb ischemia.  Patient was in multisystem organ failure, brought to the OR on 2/15: right ax-fem/fem-popliteal bypass.  Patient's pulse never returned post operative. Patient returned to the OR on  where they did an excision of the bypass and AKA.  Hospital course was complicated by deconditioning, inability to wean from the ventilator and NSTEMI. Patient was medically managed for the NSTEMI. Cardiology was engaged and there was no indication for catheterization at this time. Patient was a trial of extubation on  and patient failed due to increasing oxygen requirements. Patient was reintubated. At that time patient was made comfort measures by family.  Patient's symptoms and pain was adequate controlled and patient was terminally extubated with family at the bedside.  Patient  at 16:36.

## 2021-02-24 NOTE — PROCEDURE NOTE - NSINDICATIONS_GEN_A_CORE
arterial puncture to obtain ABG's/critical patient
critical illness/hypertonic/irritant infusion
respiratory failure

## 2021-02-24 NOTE — PROGRESS NOTE ADULT - ASSESSMENT
80F with DM, CAD, admitted with recurrent cellultis, found to have severe PVD, s/p right femoral artery endarterectomy, ax-fem, fem pop bypass, with thrombosed right subclavian graft, s/p OR for removal of femoral artery bypass and R AKA.     #1 RLE pain - managed on current regimen with neurontin and PO oxycodone, post operatively will likely need maintenance meds as well.   #2 fluid overload - cardio following. component of heart failure with preserved EF. NSTEMI. cardio following. being diuresed   #3 thrombosed graft - s/p OR for removal of bypass and right AKA.   #4 Palliative care encounter - continuing to follow clinical progress. patient is DNR

## 2021-02-24 NOTE — PROGRESS NOTE ADULT - SUBJECTIVE AND OBJECTIVE BOX
OVERNIGHT EVENTS: remains intubated in ICU     Present Symptoms:     Dyspnea: 0   Nausea/Vomiting: No  Anxiety:  No  Depression: No  Fatigue: Yes   Loss of appetite: No    Pain: none currently             Character-            Duration-            Effect-            Factors-            Frequency-            Location-            Severity-    Review of Systems: Reviewed                   Unable to obtain due to poor mentation   All others negative    MEDICATIONS  (STANDING):  acetaZOLAMIDE  IVPB 500 milliGRAM(s) IV Intermittent every 12 hours  aspirin 325 milliGRAM(s) Oral daily  atorvastatin 80 milliGRAM(s) Oral at bedtime  chlorhexidine 0.12% Liquid 15 milliLiter(s) Oral Mucosa every 12 hours  chlorhexidine 2% Cloths 1 Application(s) Topical <User Schedule>  dextrose 40% Gel 15 Gram(s) Oral once  dextrose 5%. 1000 milliLiter(s) (100 mL/Hr) IV Continuous <Continuous>  dextrose 50% Injectable 25 Gram(s) IV Push once  dextrose 50% Injectable 12.5 Gram(s) IV Push once  dextrose 50% Injectable 25 Gram(s) IV Push once  fenofibrate Tablet 145 milliGRAM(s) Oral daily  fentaNYL   Infusion 1 MICROgram(s)/kG/Hr (9.65 mL/Hr) IV Continuous <Continuous>  furosemide   Injectable 40 milliGRAM(s) IV Push every 12 hours  glucagon  Injectable 1 milliGRAM(s) IntraMuscular once  heparin  Infusion. 1500 Unit(s)/Hr (15 mL/Hr) IV Continuous <Continuous>  insulin glargine Injectable (LANTUS) 32 Unit(s) SubCutaneous at bedtime  insulin lispro (ADMELOG) corrective regimen sliding scale   SubCutaneous every 4 hours  levothyroxine 88 MICROGram(s) Oral daily  melatonin 3 milliGRAM(s) Oral at bedtime  methylPREDNISolone sodium succinate Injectable 125 milliGRAM(s) IV Push once  metoprolol tartrate 25 milliGRAM(s) Enteral Tube every 6 hours  polyethylene glycol 3350 17 Gram(s) Oral daily  senna 2 Tablet(s) Oral at bedtime    MEDICATIONS  (PRN):  acetaminophen   Tablet .. 975 milliGRAM(s) Oral every 6 hours PRN Temp greater or equal to 38C (100.4F), Mild Pain (1 - 3)  albuterol/ipratropium for Nebulization 3 milliLiter(s) Nebulizer every 6 hours PRN Wheezing  HYDROmorphone  Injectable 0.5 milliGRAM(s) IV Push every 4 hours PRN Severe Pain (7 - 10)  oxyCODONE    IR 2.5 milliGRAM(s) Oral every 6 hours PRN Moderate Pain (4 - 6)  oxyCODONE    IR 5 milliGRAM(s) Oral every 6 hours PRN Severe Pain (7 - 10)    PHYSICAL EXAM:    Vital Signs Last 24 Hrs  T(C): 36.3 (2021 12:00), Max: 37.5 (2021 15:44)  T(F): 97.4 (2021 12:00), Max: 99.5 (2021 15:44)  HR: 93 (2021 10:58) (61 - 102)  BP: --  BP(mean): --  RR: 18 (2021 10:00) (11 - 25)  SpO2: 100% (2021 10:58) (92% - 100%)    General: intubated in no acute distress     Karnofsky: 20-30 %    HEENT: normal      Lungs: comfortable     CV: normal      GI: normal    : huynh    MSK: bedbound/wheelchair bound. s/p R AKA     Skin: no rash    LABS:                      9.1    14.11 )-----------( 363      ( 2021 04:44 )             28.5     02-24    138  |  99  |  25.0<H>  ----------------------------<  163<H>  4.0   |  31.0<H>  |  0.69    Ca    8.4<L>      2021 04:44  Phos  3.2     02-24  Mg     1.7     02-24    PTT - ( 2021 04:44 )  PTT:75.6 sec  Urinalysis Basic - ( 2021 11:54 )    Color: Yellow / Appearance: Slightly Turbid / S.015 / pH: x  Gluc: x / Ketone: Negative  / Bili: Negative / Urobili: 8 mg/dL   Blood: x / Protein: 15 mg/dL / Nitrite: Negative   Leuk Esterase: Small / RBC: 25-50 /HPF / WBC 6-10   Sq Epi: x / Non Sq Epi: Occasional / Bacteria: Few    I&O's Summary    2021 07:01  -  2021 07:00  --------------------------------------------------------  IN: 1697.3 mL / OUT: 2150 mL / NET: -452.7 mL    2021 07:01  -  2021 12:21  --------------------------------------------------------  IN: 285 mL / OUT: 225 mL / NET: 60 mL    RADIOLOGY & ADDITIONAL STUDIES:    < from: Xray Chest 1 View-PORTABLE IMMEDIATE (Xray Chest 1 View-PORTABLE IMMEDIATE .) (21 @ 13:37) >  IMPRESSION: Significant congestion but there is improvement.    < end of copied text >    ADVANCE DIRECTIVES: DNR

## 2021-02-24 NOTE — PROGRESS NOTE ADULT - NSHPATTENDINGPLANDISCUSS_GEN_ALL_CORE
Dr Alcala
ID , cardio,vascular
Dr Alcala
SICU team.
podiatry
Buzz RUTLEDGE
SICU team.
PA- ortho called by PA
Patient and vascular
SICU team.
SICU team.

## 2021-02-24 NOTE — PROGRESS NOTE ADULT - ATTENDING COMMENTS
Pt seen today with family at the bedside. Patient was extubated today after which time condition declined requiring re-intubation and pressor support. Family contacted and comfort care measures instituted. Terminal wean planned for today.

## 2021-02-28 LAB
CULTURE RESULTS: SIGNIFICANT CHANGE UP
CULTURE RESULTS: SIGNIFICANT CHANGE UP
SPECIMEN SOURCE: SIGNIFICANT CHANGE UP
SPECIMEN SOURCE: SIGNIFICANT CHANGE UP

## 2021-03-08 LAB — SURGICAL PATHOLOGY STUDY: SIGNIFICANT CHANGE UP

## 2021-03-16 PROCEDURE — U0003: CPT

## 2021-03-16 PROCEDURE — 86850 RBC ANTIBODY SCREEN: CPT

## 2021-03-16 PROCEDURE — 85610 PROTHROMBIN TIME: CPT

## 2021-03-16 PROCEDURE — 88304 TISSUE EXAM BY PATHOLOGIST: CPT

## 2021-03-16 PROCEDURE — 84480 ASSAY TRIIODOTHYRONINE (T3): CPT

## 2021-03-16 PROCEDURE — 81001 URINALYSIS AUTO W/SCOPE: CPT

## 2021-03-16 PROCEDURE — 99285 EMERGENCY DEPT VISIT HI MDM: CPT | Mod: 25

## 2021-03-16 PROCEDURE — U0005: CPT

## 2021-03-16 PROCEDURE — 94640 AIRWAY INHALATION TREATMENT: CPT

## 2021-03-16 PROCEDURE — C1889: CPT

## 2021-03-16 PROCEDURE — 80048 BASIC METABOLIC PNL TOTAL CA: CPT

## 2021-03-16 PROCEDURE — 71275 CT ANGIOGRAPHY CHEST: CPT

## 2021-03-16 PROCEDURE — 36415 COLL VENOUS BLD VENIPUNCTURE: CPT

## 2021-03-16 PROCEDURE — 86923 COMPATIBILITY TEST ELECTRIC: CPT

## 2021-03-16 PROCEDURE — 93923 UPR/LXTR ART STDY 3+ LVLS: CPT

## 2021-03-16 PROCEDURE — 82553 CREATINE MB FRACTION: CPT

## 2021-03-16 PROCEDURE — 84132 ASSAY OF SERUM POTASSIUM: CPT

## 2021-03-16 PROCEDURE — 84100 ASSAY OF PHOSPHORUS: CPT

## 2021-03-16 PROCEDURE — 76000 FLUOROSCOPY <1 HR PHYS/QHP: CPT

## 2021-03-16 PROCEDURE — 87070 CULTURE OTHR SPECIMN AEROBIC: CPT

## 2021-03-16 PROCEDURE — 94003 VENT MGMT INPAT SUBQ DAY: CPT

## 2021-03-16 PROCEDURE — 84443 ASSAY THYROID STIM HORMONE: CPT

## 2021-03-16 PROCEDURE — 82947 ASSAY GLUCOSE BLOOD QUANT: CPT

## 2021-03-16 PROCEDURE — 85027 COMPLETE CBC AUTOMATED: CPT

## 2021-03-16 PROCEDURE — 93005 ELECTROCARDIOGRAM TRACING: CPT

## 2021-03-16 PROCEDURE — 85018 HEMOGLOBIN: CPT

## 2021-03-16 PROCEDURE — 83880 ASSAY OF NATRIURETIC PEPTIDE: CPT

## 2021-03-16 PROCEDURE — 85014 HEMATOCRIT: CPT

## 2021-03-16 PROCEDURE — 73562 X-RAY EXAM OF KNEE 3: CPT

## 2021-03-16 PROCEDURE — 82962 GLUCOSE BLOOD TEST: CPT

## 2021-03-16 PROCEDURE — 82330 ASSAY OF CALCIUM: CPT

## 2021-03-16 PROCEDURE — 75635 CT ANGIO ABDOMINAL ARTERIES: CPT

## 2021-03-16 PROCEDURE — C8929: CPT

## 2021-03-16 PROCEDURE — 85730 THROMBOPLASTIN TIME PARTIAL: CPT

## 2021-03-16 PROCEDURE — P9045: CPT

## 2021-03-16 PROCEDURE — 83605 ASSAY OF LACTIC ACID: CPT

## 2021-03-16 PROCEDURE — 96374 THER/PROPH/DIAG INJ IV PUSH: CPT

## 2021-03-16 PROCEDURE — 84484 ASSAY OF TROPONIN QUANT: CPT

## 2021-03-16 PROCEDURE — 93306 TTE W/DOPPLER COMPLETE: CPT

## 2021-03-16 PROCEDURE — 71045 X-RAY EXAM CHEST 1 VIEW: CPT

## 2021-03-16 PROCEDURE — 87186 SC STD MICRODIL/AGAR DIL: CPT

## 2021-03-16 PROCEDURE — C1894: CPT

## 2021-03-16 PROCEDURE — 87040 BLOOD CULTURE FOR BACTERIA: CPT

## 2021-03-16 PROCEDURE — 94760 N-INVAS EAR/PLS OXIMETRY 1: CPT

## 2021-03-16 PROCEDURE — 80053 COMPREHEN METABOLIC PANEL: CPT

## 2021-03-16 PROCEDURE — 36430 TRANSFUSION BLD/BLD COMPNT: CPT

## 2021-03-16 PROCEDURE — 86900 BLOOD TYPING SEROLOGIC ABO: CPT

## 2021-03-16 PROCEDURE — 84145 PROCALCITONIN (PCT): CPT

## 2021-03-16 PROCEDURE — 94660 CPAP INITIATION&MGMT: CPT

## 2021-03-16 PROCEDURE — 73630 X-RAY EXAM OF FOOT: CPT

## 2021-03-16 PROCEDURE — 82435 ASSAY OF BLOOD CHLORIDE: CPT

## 2021-03-16 PROCEDURE — 83735 ASSAY OF MAGNESIUM: CPT

## 2021-03-16 PROCEDURE — 86901 BLOOD TYPING SEROLOGIC RH(D): CPT

## 2021-03-16 PROCEDURE — C1768: CPT

## 2021-03-16 PROCEDURE — 86769 SARS-COV-2 COVID-19 ANTIBODY: CPT

## 2021-03-16 PROCEDURE — 88307 TISSUE EXAM BY PATHOLOGIST: CPT

## 2021-03-16 PROCEDURE — 84295 ASSAY OF SERUM SODIUM: CPT

## 2021-03-16 PROCEDURE — 94002 VENT MGMT INPAT INIT DAY: CPT

## 2021-03-16 PROCEDURE — 70450 CT HEAD/BRAIN W/O DYE: CPT

## 2021-03-16 PROCEDURE — 82803 BLOOD GASES ANY COMBINATION: CPT

## 2021-03-16 PROCEDURE — 82550 ASSAY OF CK (CPK): CPT

## 2021-03-16 PROCEDURE — 80202 ASSAY OF VANCOMYCIN: CPT

## 2021-03-16 PROCEDURE — 84436 ASSAY OF TOTAL THYROXINE: CPT

## 2021-03-16 PROCEDURE — 85025 COMPLETE CBC W/AUTO DIFF WBC: CPT

## 2021-03-16 PROCEDURE — 88311 DECALCIFY TISSUE: CPT

## 2021-03-16 PROCEDURE — P9016: CPT

## 2022-04-02 NOTE — ED ADULT NURSE NOTE - NSFALLRSKINDICATORS_ED_ALL_ED
yes
Refusal to use Left Arm, but otherwise no edema, ecchymosis, erythema. Spine appears normal, movement of extremities grossly intact.

## 2022-04-08 NOTE — ED ADULT TRIAGE NOTE - PRO INTERPRETER NEED 2
Mom calls regarding the refill for the fluticasone inhaler needs to be re-sent to the [facility] pharmacy.  There was an issue with the insurance and mom states she did not accept the prescription related to being full price.  Prescription re sent.     English

## 2022-05-15 NOTE — ED ADULT NURSE NOTE - PAIN: PRESENCE, MLM
CONST: nontoxic NAD speaking in full sentences  HEAD: atraumatic  EYES: conjunctivae clear  ENT: mmm  NECK: supple/FROM  CARD: rrr no murmurs  CHEST: ctab no r/r/w  ABD: soft, nd, +ttp lower abd, no rebound/guarding  RECTA: +anal tag, no erythema, no trauma, no active hemorrhage, MOHINDER deferred to surgery given prior fissurectomy  EXT: FROM, symmetric distal pulses intact  SKIN: warm, dry, no rash, no pedal edema/ttp/rash, cap refill <2sec  NEURO: a+ox3, 5/5 strength x4, gross sensation intact x4, baseline gait CONST: nontoxic NAD speaking in full sentences  HEAD: atraumatic  EYES: conjunctivae clear  ENT: mmm  NECK: supple/FROM  CARD: rrr no murmurs  CHEST: ctab no r/r/w  ABD: soft, nd, +ttp lower abd, no rebound/guarding  RECTA: +anal tag, no erythema, no trauma, no active hemorrhage, MOHINDER deferred to surgery given prior fissurectomy  EXT: FROM, symmetric distal pulses intact  SKIN: warm, dry, no rash, cap refill <2sec  NEURO: a+ox3, 5/5 strength x4, gross sensation intact x4, baseline gait complains of pain/discomfort

## 2023-08-28 NOTE — ED ADULT TRIAGE NOTE - TEMPERATURE IN FAHRENHEIT (DEGREES F)
----- Message from Akilasylwiasydnee Fernandez sent at 8/28/2023  1:46 PM EDT -----  Subject: Appointment Request    Reason for Call: Established Patient Appointment needed: Routine Existing   Condition Follow Up (Diabetes)    QUESTIONS    Reason for appointment request? No appointments available during search     Additional Information for Provider? Patient is requesting to schedule a   Diabetic Follow up. There are available appointments.  Can you please   contact patient with scheduling information?  ---------------------------------------------------------------------------  --------------  Marcus PALMER  [phone]; OK to leave message on voicemail  ---------------------------------------------------------------------------  --------------  SCRIPT ANSWERS 98.4

## 2023-08-29 NOTE — DIETITIAN INITIAL EVALUATION ADULT. - ENTER TO (ML/KG)
Received telephone call from patient requesting refill of Dilaudid. She has recently started back at PT and is is having pain. She is also going out of town this weekend is hoping to get a refill. Reports she has a couple tabs left from an old prescription and is taking one tab every 4 hours in the night and one tab total in the daytime.    Last refill: 5/15/2023  Last office visit: 6/15/2023  Scheduled for follow up 9/14/2023     Will route request to MD for review.     Reviewed [place]  Report.     20

## 2024-01-22 LAB — SAO2 % BLDV: SIGNIFICANT CHANGE UP % (ref 67–88)

## 2024-03-05 NOTE — DIETITIAN INITIAL EVALUATION ADULT. - WEIGHT (KG)
[facility] EMERGENCY DEPARTMENT ENCOUNTER    Basic Information  Name: Lacey Gomez Age: 69 year old Sex: female   MRN: [id number] Encounter: 3/5/2024, 3:23 PM  PCP: Noelle Bueno MD    Chief Complaint  Chief Complaint   Patient presents with    Palpitations       History of Present Illness    Lacey Gomez is a 69 year old female who presented to the ED today with a c/o palpitations.  She notes she has had palpitations for years, worse the past month.  Notes has been consistent all day.  Gets a little lightheaded and 'tingling' into her neck/head with symptoms when they get bad.  No chest pain.  Notes some sob with the palpitations.  No fever.  She avoids caffeine and alcohol.  Not a smoker.  She has not spoken to her PCP about symptoms.  Has never seen a cardiologist.  Notes no hx of any cardiac disease.  No treatment prior to arrival.  She does admit to having anxiety.  No other concerns noted.      Available triage/nursing notes were reviewed by me.      Health Status  Allergies:  ALLERGIES:  No Known Allergies    Current Medications:  No current facility-administered medications on file prior to encounter.     No current outpatient medications on file prior to encounter.       Past Medical History    History reviewed. No pertinent past medical history.    No past surgical history on file.    No family history on file.         Review of Systems/Physical Exam    REVIEW OF SYSTEMS:    ROS reviewed, pertinent positives/negatives as documented in HPI.    PHYSICAL EXAM:   ED Triage Vitals [03/05/24 1257]   BP (!) 152/84   Heart Rate 95   Resp 16   Temp 97.3 °F (36.3 °C)   SpO2 98 %      General:  No acute distress, Awake, Alert, Well appearing.  Skin:  Warm, dry.  Head:  Normocephalic, atraumatic.  Eye:  Pupils are equal, round and reactive to light, extraocular movements are intact, vision grossly normal.  Ears, nose, mouth and throat:  Oral mucosa moist, Normal appearing nose.  Normal  auricles.  Cardiovascular:  Regular rate and rhythm, systolic ejection murmur, Normal peripheral pulses bilaterally.  Respiratory:  Lungs are clear to auscultation, respirations are non-labored, Symmetrical chest wall expansion, No wheezes.  Gastrointestinal:  Soft, Nontender, Non distended, Guarding: Negative.  Musculoskeletal:  Normal ROM, normal strength, no tenderness, no swelling, no deformity.  Neurological:  Alert and oriented to person, place, time, and situation, No focal neurological deficit observed, normal speech observed, 5/5 strength in all extremities.      Diagnostics    Laboratory Results:  Results for orders placed or performed during the hospital encounter of 03/05/24   Light Blue Top   Result Value Ref Range    Extra Tube Hold for Add Ons    Light Green Top   Result Value Ref Range    Extra Tube Hold for Add Ons    Lavender Top   Result Value Ref Range    Extra Tube Hold for Add Ons    Gold Top   Result Value Ref Range    Extra Tube Hold for Add Ons    Comprehensive Metabolic Panel   Result Value Ref Range    Fasting Status      Sodium 142 135 - 145 mmol/L    Potassium 3.8 3.4 - 5.1 mmol/L    Chloride 114 (H) 97 - 110 mmol/L    Carbon Dioxide 23 21 - 32 mmol/L    Anion Gap 9 7 - 19 mmol/L    Glucose 136 (H) 70 - 99 mg/dL    BUN 14 6 - 20 mg/dL    Creatinine 0.88 0.51 - 0.95 mg/dL    Glomerular Filtration Rate 72 >=60    BUN/Cr 16 7 - 25    Calcium 9.4 8.4 - 10.2 mg/dL    Bilirubin, Total 0.5 0.2 - 1.0 mg/dL    GOT/AST 23 <=37 Units/L    GPT/ALT 35 <64 Units/L    Alkaline Phosphatase 125 (H) 45 - 117 Units/L    Albumin 3.8 3.6 - 5.1 g/dL    Protein, Total 7.4 6.4 - 8.2 g/dL    Globulin 3.6 2.0 - 4.0 g/dL    A/G Ratio 1.1 1.0 - 2.4   TROPONIN I, HIGH SENSITIVITY   Result Value Ref Range    Troponin I, High Sensitivity 5 <52 ng/L   Magnesium   Result Value Ref Range    Magnesium 2.2 1.7 - 2.4 mg/dL   CBC with Automated Differential (performable only)   Result Value Ref Range    WBC 6.5 4.2 - 11.0  K/mcL    RBC 4.77 4.00 - 5.20 mil/mcL    HGB 14.7 12.0 - 15.5 g/dL    HCT 43.8 36.0 - 46.5 %    MCV 91.8 78.0 - 100.0 fl    MCH 30.8 26.0 - 34.0 pg    MCHC 33.6 32.0 - 36.5 g/dL    RDW-CV 13.4 11.0 - 15.0 %    RDW-SD 45.1 39.0 - 50.0 fL     140 - 450 K/mcL    NRBC 0 <=0 /100 WBC    Neutrophil, Percent 56 %    Lymphocytes, Percent 35 %    Mono, Percent 6 %    Eosinophils, Percent 2 %    Basophils, Percent 1 %    Immature Granulocytes 0 %    Absolute Neutrophils 3.7 1.8 - 7.7 K/mcL    Absolute Lymphocytes 2.3 1.0 - 4.0 K/mcL    Absolute Monocytes 0.4 0.3 - 0.9 K/mcL    Absolute Eosinophils  0.1 0.0 - 0.5 K/mcL    Absolute Basophils 0.0 0.0 - 0.3 K/mcL    Absolute Immature Granulocytes 0.0 0.0 - 0.2 K/mcL   Thyroid Stimulating Hormone   Result Value Ref Range    TSH 4.160 0.350 - 5.000 mcUnits/mL   D Dimer, Quantitative   Result Value Ref Range    D Dimer, Quantitative 0.90 (H) <0.57 mg/L (FEU)       Radiology Results:  CTA CHEST PULMONARY EMBOLISM   Final Result   1.   No CT evidence of pulmonary embolus.   2.   Minimal groundglass opacity seen in the lower lungs which could   represent an inflammatory/infectious process.      Electronically Signed by: ASHLEY ABBOTT M.D.    Signed on: 3/5/2024 7:16 PM    Workstation ID: [id number]      XR CHEST PA OR AP 1 VIEW   Final Result   1.   No acute intrathoracic abnormalities identified.         Electronically Signed by: SAKSHI EVANS M.D.    Signed on: 3/5/2024 1:49 PM    Workstation ID: [id number]          Medications Ordered/Given  Medications   iohexol (OMNIPAQUE 350 INJECT) contrast solution 100 mL (100 mLs Intravenous Given 3/5/24 1911)       ED Course  Vitals:    03/05/24 1257 03/05/24 1453 03/05/24 1625 03/05/24 1626   BP: (!) 152/84 (!) 145/83  128/71   Pulse: 95 82 65 71   Resp: 16 16 16 (!) 24   Temp: 97.3 °F (36.3 °C)      SpO2: 98% 97% 95% 95%   Weight: 96.8 kg (213 lb 6.5 oz)          ED Course as of 03/06/24 1118   Tue Mar 05, 2024            1527 ECG Independently Reviewed and Interpreted by Myself  Time: 1303  Interpretation: NSR Rate of 90, Normal QRS, no ST changes.   []   1611 Updated pt.  Has occasional PACs on monitor, may explain symptoms.  Agrees with getting CTA chest. [JH]      ED Course User Index  [JH] Abel Hernandez P, DO  [] Blanca Park, CNP       MEDICAL DECISION MAKING  Multiple differential diagnoses were considered. The patient / caregivers were apprised of diagnostic / treatment options including alternate modes of care, in addition to the risks and benefits, for this evaluation. Based on this discussion the patient / caregiver agrees with this chosen diagnostic and treatment plan.    Lacey Gomez as noted presented for palpitations as noted.  Has been going on for awhile, more prevalent recently it seems.  No chest pain.  Pt is well appearing.  Notes she has some anxiety, may be contributing.  Has PACs on monitor, we discussed this may be cause/contributing to her symptoms as well.  Labs reassuring.  Trop neg.  No PE.  Electrolytes ok.  Pt is reassured and wants to go home.  Not interested in hospitalization.  Discussed supportive care.  Does not seem to be any acute cardiac event.  Advised she needs to f/u with her pcp and should see a cardiologist as well for outpatient f/u.  She will return for any worsening.  She had no other concerns.  Of note, CT chest findings as noted discussed with patient, she notes she had a 'bad' viral infection a few months ago, lung findings may be result of that.      Multiple differential diagnoses were considered including but not limited to: arrhythmia, PACs/PVCs, dehydration, anxiety    Comorbidities and chronic medical conditions that impacted care included but were not limited to: anxiety    Laboratory results as above were independently reviewed and interpreted by myself.    Radiology results as above were independently reviewed by myself.    Further testing/treatment that was  considered but not indicated to be performed emergently at this time in the emergency room included: admit (not indicated, w/u reassuring, pt is well appearing, pt is not interested in admit also)      Impression and Plan  Diagnosis:  1. Palpitations    2. Premature atrial contractions        Condition:  STABLE    Disposition:  Discharge 3/5/2024  7:38 PM  Lacey Gomez discharge to home/self care.        Follow Up:  Anthony Woods MD  [address] [id number]  [phone]    In 3 days         There are no discharge medications for this patient.      Written discharge Instructions were provided.    Counseled:  Patient, Regarding diagnosis, Regarding applicable diagnostic results, Regarding treatment, and Patient understood    The patient was feeling better at the time of disposition.  They are discharged home in stable condition.  I have reviewed all testing and discharge instructions with the patient.  I have asked the patient to return to the emergency department for any changes in their condition or any further concerns.  They have been advised on the importance to follow up with their PCP within the next 2 days for further evaluation and to call their office ASAP to make an appointment.  Any prescriptions that were provided were discussed with the patient.  All of their questions were answered.             Abel Hernandez, DO  03/06/24 1122     88.4

## 2024-11-22 NOTE — BRIEF OPERATIVE NOTE - PRIMARY SURGEON
Please schedule for an OV in the next three weeks,   Patient needs a new med contract   
Israel Wells
Dr Surendra Gonsalez

## 2025-04-25 NOTE — DISCHARGE NOTE PROVIDER - NSDCMRMEDTOKEN_GEN_ALL_CORE_FT
Continue home medications.    aspirin 325 mg oral delayed release tablet: 1 tab(s) orally once a day  atorvastatin 80 mg oral tablet: 1 tab(s) orally once a day (at bedtime)  cilostazol 100 mg oral tablet: 1 tab(s) orally every 12 hours  fenofibrate 145 mg oral tablet: 1 tab(s) orally once a day  levothyroxine 88 mcg (0.088 mg) oral tablet: 1 tab(s) orally once a day  lisinopril 40 mg oral tablet: 1 tab(s) orally once a day  Metoprolol Succinate  mg oral tablet, extended release: 1 tab(s) orally once a day  ranolazine 500 mg oral tablet, extended release: 1 tab(s) orally 2 times a day  Tresiba 100 units/mL subcutaneous solution: 22 unit(s) subcutaneous once a day (at bedtime)  Trulicity Pen 1.5 mg/0.5 mL subcutaneous solution: 1 application subcutaneous once a week